# Patient Record
Sex: MALE | Race: WHITE | NOT HISPANIC OR LATINO | Employment: FULL TIME | URBAN - METROPOLITAN AREA
[De-identification: names, ages, dates, MRNs, and addresses within clinical notes are randomized per-mention and may not be internally consistent; named-entity substitution may affect disease eponyms.]

---

## 2017-01-06 RX ORDER — TADALAFIL 20 MG/1
20 TABLET ORAL
Status: ON HOLD | COMMUNITY
End: 2017-01-10

## 2017-01-06 RX ORDER — SIMVASTATIN 40 MG
40 TABLET ORAL DAILY
Status: ON HOLD | COMMUNITY
End: 2018-02-01

## 2017-01-06 RX ORDER — ASPIRIN 81 MG/1
81 TABLET ORAL DAILY
Status: ON HOLD | COMMUNITY
End: 2017-01-10

## 2017-01-09 ENCOUNTER — ANESTHESIA EVENT (OUTPATIENT)
Dept: GASTROENTEROLOGY | Facility: MEDICAL CENTER | Age: 57
End: 2017-01-09
Payer: COMMERCIAL

## 2017-01-09 RX ORDER — SODIUM CHLORIDE 9 MG/ML
125 INJECTION, SOLUTION INTRAVENOUS CONTINUOUS
Status: CANCELLED | OUTPATIENT
Start: 2017-01-09

## 2017-01-10 ENCOUNTER — GENERIC CONVERSION - ENCOUNTER (OUTPATIENT)
Dept: GASTROENTEROLOGY | Facility: MEDICAL CENTER | Age: 57
End: 2017-01-10

## 2017-01-10 ENCOUNTER — HOSPITAL ENCOUNTER (OUTPATIENT)
Facility: MEDICAL CENTER | Age: 57
Setting detail: OUTPATIENT SURGERY
Discharge: HOME | End: 2017-01-10
Attending: INTERNAL MEDICINE | Admitting: INTERNAL MEDICINE
Payer: COMMERCIAL

## 2017-01-10 ENCOUNTER — ANESTHESIA (OUTPATIENT)
Dept: GASTROENTEROLOGY | Facility: MEDICAL CENTER | Age: 57
End: 2017-01-10
Payer: COMMERCIAL

## 2017-01-10 VITALS
RESPIRATION RATE: 15 BRPM | WEIGHT: 163 LBS | BODY MASS INDEX: 23.34 KG/M2 | DIASTOLIC BLOOD PRESSURE: 59 MMHG | HEIGHT: 70 IN | OXYGEN SATURATION: 99 % | SYSTOLIC BLOOD PRESSURE: 104 MMHG | TEMPERATURE: 97.4 F | HEART RATE: 72 BPM

## 2017-01-10 DIAGNOSIS — Z12.11 ENCOUNTER FOR SCREENING FOR MALIGNANT NEOPLASM OF COLON: ICD-10-CM

## 2017-01-10 PROCEDURE — 88305 TISSUE EXAM BY PATHOLOGIST: CPT | Performed by: INTERNAL MEDICINE

## 2017-01-10 RX ORDER — PROPOFOL 10 MG/ML
INJECTION, EMULSION INTRAVENOUS AS NEEDED
Status: DISCONTINUED | OUTPATIENT
Start: 2017-01-10 | End: 2017-01-10 | Stop reason: SURG

## 2017-01-10 RX ORDER — ONDANSETRON 2 MG/ML
4 INJECTION INTRAMUSCULAR; INTRAVENOUS EVERY 6 HOURS PRN
Status: DISCONTINUED | OUTPATIENT
Start: 2017-01-10 | End: 2017-01-10 | Stop reason: HOSPADM

## 2017-01-10 RX ORDER — MELOXICAM 15 MG/1
15 TABLET ORAL DAILY
Status: ON HOLD | COMMUNITY
End: 2018-02-01

## 2017-01-10 RX ORDER — SODIUM CHLORIDE 9 MG/ML
125 INJECTION, SOLUTION INTRAVENOUS CONTINUOUS
Status: DISCONTINUED | OUTPATIENT
Start: 2017-01-10 | End: 2017-01-10 | Stop reason: HOSPADM

## 2017-01-10 RX ADMIN — PROPOFOL 50 MG: 10 INJECTION, EMULSION INTRAVENOUS at 14:17

## 2017-01-10 RX ADMIN — PROPOFOL 25 MG: 10 INJECTION, EMULSION INTRAVENOUS at 14:25

## 2017-01-10 RX ADMIN — PROPOFOL 50 MG: 10 INJECTION, EMULSION INTRAVENOUS at 14:12

## 2017-01-10 RX ADMIN — PROPOFOL 50 MG: 10 INJECTION, EMULSION INTRAVENOUS at 14:14

## 2017-01-10 RX ADMIN — PROPOFOL 100 MG: 10 INJECTION, EMULSION INTRAVENOUS at 14:09

## 2017-01-10 RX ADMIN — SODIUM CHLORIDE: 0.9 INJECTION, SOLUTION INTRAVENOUS at 14:15

## 2017-01-10 RX ADMIN — SODIUM CHLORIDE: 0.9 INJECTION, SOLUTION INTRAVENOUS at 14:05

## 2017-01-10 RX ADMIN — LIDOCAINE HYDROCHLORIDE 50 MG: 20 INJECTION, SOLUTION INTRAVENOUS at 14:05

## 2017-01-10 RX ADMIN — PROPOFOL 25 MG: 10 INJECTION, EMULSION INTRAVENOUS at 14:21

## 2017-01-10 RX ADMIN — SODIUM CHLORIDE 125 ML/HR: 0.9 INJECTION, SOLUTION INTRAVENOUS at 13:13

## 2017-01-12 ENCOUNTER — GENERIC CONVERSION - ENCOUNTER (OUTPATIENT)
Dept: OTHER | Facility: OTHER | Age: 57
End: 2017-01-12

## 2017-02-20 ENCOUNTER — ALLSCRIPTS OFFICE VISIT (OUTPATIENT)
Dept: OTHER | Facility: OTHER | Age: 57
End: 2017-02-20

## 2017-02-21 ENCOUNTER — LAB CONVERSION - ENCOUNTER (OUTPATIENT)
Dept: OTHER | Facility: OTHER | Age: 57
End: 2017-02-21

## 2017-02-21 LAB
CONTACT: (HISTORICAL): NORMAL
PROSTATE SPECIFIC ANTIGEN TOTAL (HISTORICAL): 2.5 NG/ML
TEST INFORMATION (HISTORICAL): NORMAL
TEST NAME (HISTORICAL): NORMAL

## 2017-02-22 ENCOUNTER — GENERIC CONVERSION - ENCOUNTER (OUTPATIENT)
Dept: OTHER | Facility: OTHER | Age: 57
End: 2017-02-22

## 2017-08-21 ENCOUNTER — LAB REQUISITION (OUTPATIENT)
Dept: LAB | Facility: HOSPITAL | Age: 57
End: 2017-08-21
Payer: COMMERCIAL

## 2017-08-21 ENCOUNTER — ALLSCRIPTS OFFICE VISIT (OUTPATIENT)
Dept: OTHER | Facility: OTHER | Age: 57
End: 2017-08-21

## 2017-08-21 DIAGNOSIS — E78.49 OTHER HYPERLIPIDEMIA: ICD-10-CM

## 2017-08-21 DIAGNOSIS — R73.03 PREDIABETES: ICD-10-CM

## 2017-08-21 LAB
ALBUMIN SERPL BCP-MCNC: 3.8 G/DL (ref 3.5–5)
ALP SERPL-CCNC: 41 U/L (ref 46–116)
ALT SERPL W P-5'-P-CCNC: 25 U/L (ref 12–78)
ANION GAP SERPL CALCULATED.3IONS-SCNC: 7 MMOL/L (ref 4–13)
AST SERPL W P-5'-P-CCNC: 20 U/L (ref 5–45)
BILIRUB SERPL-MCNC: 0.68 MG/DL (ref 0.2–1)
BUN SERPL-MCNC: 21 MG/DL (ref 5–25)
CALCIUM SERPL-MCNC: 9.1 MG/DL (ref 8.3–10.1)
CHLORIDE SERPL-SCNC: 110 MMOL/L (ref 100–108)
CHOLEST SERPL-MCNC: 151 MG/DL (ref 50–200)
CO2 SERPL-SCNC: 24 MMOL/L (ref 21–32)
CREAT SERPL-MCNC: 0.89 MG/DL (ref 0.6–1.3)
EST. AVERAGE GLUCOSE BLD GHB EST-MCNC: 117 MG/DL
GFR SERPL CREATININE-BSD FRML MDRD: 94 ML/MIN/1.73SQ M
GLUCOSE P FAST SERPL-MCNC: 106 MG/DL (ref 65–99)
HBA1C MFR BLD: 5.7 % (ref 4.2–6.3)
HDLC SERPL-MCNC: 36 MG/DL (ref 40–60)
LDLC SERPL CALC-MCNC: 91 MG/DL (ref 0–100)
POTASSIUM SERPL-SCNC: 4.4 MMOL/L (ref 3.5–5.3)
PROT SERPL-MCNC: 7.6 G/DL (ref 6.4–8.2)
SODIUM SERPL-SCNC: 141 MMOL/L (ref 136–145)
TRIGL SERPL-MCNC: 118 MG/DL

## 2017-08-21 PROCEDURE — 80061 LIPID PANEL: CPT | Performed by: FAMILY MEDICINE

## 2017-08-21 PROCEDURE — 80053 COMPREHEN METABOLIC PANEL: CPT | Performed by: FAMILY MEDICINE

## 2017-08-21 PROCEDURE — 83036 HEMOGLOBIN GLYCOSYLATED A1C: CPT | Performed by: FAMILY MEDICINE

## 2017-08-22 ENCOUNTER — GENERIC CONVERSION - ENCOUNTER (OUTPATIENT)
Dept: OTHER | Facility: OTHER | Age: 57
End: 2017-08-22

## 2017-08-29 ENCOUNTER — ALLSCRIPTS OFFICE VISIT (OUTPATIENT)
Dept: OTHER | Facility: OTHER | Age: 57
End: 2017-08-29

## 2017-09-13 ENCOUNTER — ALLSCRIPTS OFFICE VISIT (OUTPATIENT)
Dept: OTHER | Facility: OTHER | Age: 57
End: 2017-09-13

## 2017-09-24 ENCOUNTER — GENERIC CONVERSION - ENCOUNTER (OUTPATIENT)
Dept: OTHER | Facility: OTHER | Age: 57
End: 2017-09-24

## 2017-10-30 ENCOUNTER — GENERIC CONVERSION - ENCOUNTER (OUTPATIENT)
Dept: OTHER | Facility: OTHER | Age: 57
End: 2017-10-30

## 2017-12-06 ENCOUNTER — GENERIC CONVERSION - ENCOUNTER (OUTPATIENT)
Dept: FAMILY MEDICINE CLINIC | Facility: CLINIC | Age: 57
End: 2017-12-06

## 2018-01-11 ENCOUNTER — ALLSCRIPTS OFFICE VISIT (OUTPATIENT)
Dept: OTHER | Facility: OTHER | Age: 58
End: 2018-01-11

## 2018-01-11 ENCOUNTER — TRANSCRIBE ORDERS (OUTPATIENT)
Dept: ADMINISTRATIVE | Facility: HOSPITAL | Age: 58
End: 2018-01-11

## 2018-01-11 DIAGNOSIS — N20.0 URIC ACID NEPHROLITHIASIS: Primary | ICD-10-CM

## 2018-01-14 VITALS
RESPIRATION RATE: 16 BRPM | SYSTOLIC BLOOD PRESSURE: 124 MMHG | HEART RATE: 68 BPM | HEIGHT: 71 IN | TEMPERATURE: 96.4 F | BODY MASS INDEX: 22.31 KG/M2 | DIASTOLIC BLOOD PRESSURE: 76 MMHG | OXYGEN SATURATION: 98 % | WEIGHT: 159.38 LBS

## 2018-01-14 VITALS
HEIGHT: 70 IN | BODY MASS INDEX: 23.37 KG/M2 | SYSTOLIC BLOOD PRESSURE: 118 MMHG | HEART RATE: 72 BPM | DIASTOLIC BLOOD PRESSURE: 82 MMHG | WEIGHT: 163.25 LBS

## 2018-01-14 VITALS
HEART RATE: 63 BPM | RESPIRATION RATE: 16 BRPM | HEIGHT: 70 IN | TEMPERATURE: 95.9 F | BODY MASS INDEX: 23.4 KG/M2 | OXYGEN SATURATION: 99 % | WEIGHT: 163.44 LBS | SYSTOLIC BLOOD PRESSURE: 110 MMHG | DIASTOLIC BLOOD PRESSURE: 70 MMHG

## 2018-01-15 NOTE — PROGRESS NOTES
Assessment   1  Nephrolithiasis (592 0) (N20 0)    Plan   Nephrolithiasis    · CT RENAL STONE STUDY ABDOMEN PELVIS WO CONTRAST; Status:Need Information    - Financial Authorization; Requested LQR:[id number]; Perform:[facility]; EZA:[id number]; Last Updated By:Yvonne Ayers; 1/11/2018 11:38:25 AM;Ordered;For:Nephrolithiasis; Ordered By:Rosemarie Haji; Discussion/Summary   Discussion Summary:    BPH and nephrolithiasis   is a 60 y/o male being managed by Dr April Triplett  He is currently comfortable  He is concerned that his stone has had past  A CT stone study we will be obtained to evaluate the location of his stone  He will turn to review results  He was instructed to call with any recurrence of symptoms  PVR should be obtained at his next visit to evaluate his enlarged prostate  All questions were answered  Chief Complaint   Chief Complaint Free Text Note Form: Patient presents for follow up due to nephrolithiasis      History of Present Illness   HPI: 68-year-old male recently evaluated for BPH presents today for evaluation of kidney stones  He states last Friday he developed severe left flank pain  He presented to [facility] we will embark  CT scan was performed and revealed a 3 mm left ureteral stone  Unfortunately these records are not available at this time  He denies any pain since his 1st episode  He denies any nausea or vomiting  He denies any lower urinary tract symptoms or gross hematuria  He is taking Flomax daily  He does admit to having a stone episode about 5 years ago  Review of Systems   Complete-Male Urology:      Constitutional: No fever or chills, feels well, no tiredness, no recent weight gain or weight loss  Respiratory: No complaints of shortness of breath, no wheezing, no cough, no SOB on exertion, no orthopnea or PND  Cardiovascular: No complaints of slow heart rate, no fast heart rate, no chest pain, no palpitations, no leg claudication, no lower extremity  Gastrointestinal: No complaints of abdominal pain, no constipation, no nausea or vomiting, no diarrhea or bloody stools  Genitourinary: Empty sensation-- and-- stream quality good, but-- no dysuria,-- no urinary hesitancy,-- no hematuria,-- no incontinence-- and-- no feelings of urinary urgency--       The patient presents with complaints of nocturia (2 times *)  Musculoskeletal: No complaints of arthralgia, no myalgias, no joint swelling or stiffness, no limb pain or swelling  Integumentary: No complaints of skin rash or skin lesions, no itching, no skin wound, no dry skin  Hematologic/Lymphatic: No complaints of swollen glands, no swollen glands in the neck, does not bleed easily, no easy bruising  Neurological: No compliants of headache, no confusion, no convulsions, no numbness or tingling, no dizziness or fainting, no limb weakness, no difficulty walking  ROS Reviewed:    ROS reviewed  Active Problems   1  Acute bacterial sinusitis (461 9) (J01 90,B96 89)   2  Anxiety (300 00) (F41 9)   3  Arthralgia of left temporomandibular joint (524 62) (M26 622)   4  BPH with obstruction/lower urinary tract symptoms (600 01,599 69) (N40 1,N13 8)   5  Cellulitis (682 9) (L03 90)   6  Depression (311) (F32 9)   7  Erectile dysfunction of non-organic origin (302 72) (F52 21)   8  Familial combined hyperlipidemia (272 2) (E78 4)   9  Folliculitis (431 2) (K44 4)   10  Need for influenza vaccination (V04 81) (Z23)   11  Nephrolithiasis (592 0) (N20 0)   12  Peripheral neuropathy (356 9) (G62 9)   13  Personal history of colonic polyps (V12 72) (Z86 010)   14  Prediabetes (790 29) (R73 03)    Past Medical History   1  History of Abnormal blood chemistry (790 6) (R79 9)   2  History of Bilateral foot pain (729 5) (M79 671,M79 672)   3  History of Encounter for prostate cancer screening (V76 44) (Z12 5)   4  History of Encounter for screening colonoscopy (V76 51) (Z12 11)   5   History of Flu vaccine need (V04 81) (Z23)   6  History of Foot Pain (Soft Tissue) (729 5)   7  History of acute sinusitis (V12 69) (Z87 09)   8  History of allergic rhinitis (V12 69) (Z87 09)   9  History of hyperglycemia (V12 29) (Z86 39)   10  History of viral infection (V12 09) (Z86 19)   11  History of Lumbar Strain (847 2)   12  History of Paresthesia of foot (782 0) (R20 2)   13  History of Special screening examination for neoplasm of prostate (V76 44) (Z12 5)   14  History of Visit For:  Active Problems And Past Medical History Reviewed: The active problems and past medical history were reviewed and updated today  Surgical History   1  History of Foot Surgery   2  History of Inguinal Hernia Repair  Surgical History Reviewed: The surgical history was reviewed and updated today  Family History   Mother    1  Family history of    2  Family history of lung cancer (V16 1) (Z80 1)   3  Denied: Family history of substance abuse   4  Denied: Family history of Mental health problem  Father    5  Family history of    6  Family history of lung cancer (V16 1) (Z80 1)   7  Denied: Family history of substance abuse   8  Denied: Family history of Mental health problem  Family History Reviewed: The family history was reviewed and updated today  Social History    · Always uses seat belt   · Feels safe at home   · Never A Smoker   · No guns in the home   · Social alcohol use (Z78 9)  Social History Reviewed: The social history was reviewed and updated today  The social history was reviewed and is unchanged  Current Meds    1  Adult Aspirin EC Low Strength 81 MG Oral Tablet Delayed Release; Take 1 tablet daily     Recorded   2  Cialis 20 MG Oral Tablet; TAKE AS DIRECTED; Therapy: 62EXZ7426 to (Last Rx:64Mgq7119) Ordered   3  Daily Multiple Vitamins Oral Tablet; Take 1 tablet daily Recorded   4  Meloxicam 15 MG Oral Tablet; TAKE 1 TABLET DAILY WITH FOOD;      Therapy: 19Tha7893 to Recorded   5  Simvastatin 20 MG Oral Tablet; Take 1 tablet daily; Therapy: 42JTN8390 to (Evaluate:19Jan2018)  Requested for: 44Wcl7262; Last     Rx:38Pld7304 Ordered   6  ZyrTEC Allergy CAPS; PRN Recorded  Medication List Reviewed: The medication list was reviewed and updated today  Allergies   1  No Known Drug Allergies  2  Pollen   3  Trees    Vitals   Vital Signs    Recorded: 64DNX4683 11:03AM   Heart Rate 76   Systolic 151   Diastolic 82   Height 5 ft 10 in   Weight 164 lb    BMI Calculated 23 53   BSA Calculated 1 92     Physical Exam        Constitutional      General appearance: No acute distress, well appearing and well nourished  Pulmonary      Respiratory effort: No increased work of breathing or signs of respiratory distress  Cardiovascular      Palpation of heart: Normal PMI, no thrills  Examination of extremities for edema and/or varicosities: Normal        Abdomen      Abdomen: Non-tender, no masses  Musculoskeletal      Gait and station: Normal        Skin      Skin and subcutaneous tissue: Normal without rashes or lesions         Signatures    Electronically signed by : Adali Copeland, [address] [place] [address]; Jan 11 2018 11:45AM EST                       (Author)     Electronically signed by : Jez Carlisle MD; Jan 14 2018  7:23PM EST

## 2018-01-15 NOTE — RESULT NOTES
Message  I spoke with patient regarding lab results  Georgette Ormond Georgette Ormond 1) blood sugar is still mildly elevated at 106 (A1c 5 7%)  Recommend reduced carb diet  Continue exercise program  We'll continue to monitor    2) cholesterol 151/71  Patient doing well on simvastatin 40, but he is requesting a trial reduction of dosage  Will reduce to 20 mg daily  Will recheck labs in 6 months a quest followed by appointment  3) patient mentioned that he has been having nocturia recently  He made an appointment to see a urologist on September 13  Signatures   Electronically signed by :  Edita Modi DO; Aug 22 2017 11:01AM EST                       (Author)

## 2018-01-16 NOTE — RESULT NOTES
Verified Results  (Q) COMPREHENSIVE METABOLIC PNL W/ADJUSTED CALCIUM [id number] 06:46AM Martha Marcus     Test Name Result Flag Reference   GLUCOSE 101 mg/dL H 65-99   Fasting reference interval   UREA NITROGEN (BUN) 22 mg/dL  7-25   CREATININE 0 97 mg/dL  0 70-1 33   For patients >52years of age, the reference limit  for Creatinine is approximately 13% higher for people  identified as -American  eGFR NON-AFR  AMERICAN 86 mL/min/1 73m2  > OR = 60   eGFR AFRICAN AMERICAN 100 mL/min/1 73m2  > OR = 60   BUN/CREATININE RATIO   8-32   NOT APPLICABLE (calc)   SODIUM 138 mmol/L  135-146   POTASSIUM 4 1 mmol/L  3 5-5 3   CHLORIDE 105 mmol/L     CARBON DIOXIDE 24 mmol/L  19-30   CALCIUM 9 2 mg/dL  8 6-10 3   CALCIUM (ADJUSTED FOR$ALBUMIN) 9 3 mg/dL (calc)  8 6-10 2   PROTEIN, TOTAL 7 3 g/dL  6 1-8 1   ALBUMIN 4 2 g/dL  3 6-5 1   GLOBULIN 3 1 g/dL (calc)  1 9-3 7   ALBUMIN/GLOBULIN RATIO 1 4 (calc)  1 0-2 5   BILIRUBIN, TOTAL 0 8 mg/dL  0 2-1 2   ALKALINE PHOSPHATASE 34 U/L L    AST 22 U/L  10-35   ALT 25 U/L  9-46     (Q) LIPID PANEL WITH REFLEX TO DIRECT LDL 15OGP8275 06:46AM Martha Marcus     Test Name Result Flag Reference   CHOLESTEROL, TOTAL 175 mg/dL  125-200   HDL CHOLESTEROL 37 mg/dL L > OR = 40   TRIGLICERIDES 603 mg/dL  <150   LDL-CHOLESTEROL 112 mg/dL (calc)  <130   Desirable range <100 mg/dL for patients with CHD or  diabetes and <70 mg/dL for diabetic patients with  known heart disease  CHOL/HDLC RATIO 4 7 (calc)  < OR = 5 0   NON HDL CHOLESTEROL 138 mg/dL (calc)     Target for non-HDL cholesterol is 30 mg/dL higher than   LDL cholesterol target  (Q) PSA, TOTAL WITH REFLEX TO PSA, FREE [id number] 06:46AM Martha Marcus   REPORT COMMENT:  FASTING:YES     Test Name Result Flag Reference   TOTAL PSA 2 3 ng/mL  < OR = 4 0   This test was performed using the [place]  immunoassay method  Values obtained with other assay  methods cannot be used interchangeably   PSA levels,  regardless of value, should not be interpreted as  absolute evidence of the presence or absence of disease  Plan  Blood glucose elevated, BPH with obstruction/lower urinary tract symptoms, Health  Maintenance, Familial combined hyperlipidemia    · (Q) CBC (H/H, RBC, INDICES, WBC, PLT); Status:Active; Requested for:80Tuk5823;    · (Q) COMPREHENSIVE METABOLIC PNL W/ADJUSTED CALCIUM; Status:Active; Requested for:20Imu6457;    · (Q) HEMOGLOBIN A1c WITH eAG; Status:Active; Requested for:19Jul2016;    · (Q) LIPID PANEL WITH REFLEX TO DIRECT LDL; Status:Active; Requested  for:81Dil4184;    · (Q) TSH, 3RD GENERATION W/REFLEX TO FT4; Status:Active;  Requested  for:19Jul2016;

## 2018-01-16 NOTE — RESULT NOTES
Message   reminder for 5 years  thansk  Verified Results  (1) TISSUE EXAM [id number] 02:20PM Ashlie Vázquez     Test Name Result Flag Reference   LAB AP CASE REPORT (Report)     Surgical Pathology Report             Case: [id number]                   Authorizing Provider: Sarai Zamarripa MD       Collected:      01/10/2017 1420        Ordering Location:   [facility]    Received:      01/11/2017 [facility]                            Pathologist:      Rashaun Arenas DO                               Specimen:  Polyp, Colorectal, Descending colon polyp   LAB AP FINAL DIAGNOSIS      A  Colon, descending polyp, biopsy:  - Tubular adenoma  - Negative for high-grade dysplasia  Interpretation performed at [facility], [place] [address]  Electronically signed by Rashaun Arenas DO on 1/12/2017 at 8:55 AM   LAB AP SURGICAL ADDITIONAL INFORMATION (Report)     These tests were developed and their performance characteristics   determined by [facility] or [employer]  They may not be cleared or approved by the [place]  Food and   Drug Administration  The FDA has determined that such clearance or   approval is not necessary  These tests are used for clinical purposes  They should not be regarded as investigational or for research  This   laboratory has been approved by CLIA 88, designated as a high-complexity   laboratory and is qualified to perform these tests  LAB AP GROSS DESCRIPTION (Report)     A  The specimen is received in formalin, labeled with the patient's name   and hospital number, and is designated descending colon polyp, is a   single irregularly shaped fragment of tan-brown soft tissue measuring 0 3   cm in greatest dimension  Entirely submitted  One cassette       Note: The estimated total formalin fixation time based upon information   provided by the submitting clinician and the standard processing schedule   is 23 75 hours  RLR   LAB AP CLINICAL INFORMATION      descending colon polyp cold forceps   descending colon polyp cold forceps       Discussion/Summary   Benign polyp - tubular adenoma  REcommend repeat in 5 years

## 2018-01-17 ENCOUNTER — HOSPITAL ENCOUNTER (OUTPATIENT)
Dept: RADIOLOGY | Age: 58
Discharge: HOME | End: 2018-01-17
Payer: COMMERCIAL

## 2018-01-17 DIAGNOSIS — N20.0 CALCULUS OF KIDNEY: ICD-10-CM

## 2018-01-17 PROCEDURE — 74176 CT ABD & PELVIS W/O CONTRAST: CPT

## 2018-01-17 NOTE — RESULT NOTES
Message   call pt   his prostate level was ok (2 5)  Verified Results  (Q) TEST AUTHORIZATION [id number] 12:00AM Eldonna Po     Test Name Result Flag Reference   TEST(S) ORDERED ON$REQUISITION PSA, TOTAL     TEST CODE: [id number]     CLIENT CONTACT: SHONA KELLY     REPORT ALWAYS MESSAGE$SIGNATURE See Below     The laboratory testing on this patient was verbally requested  or confirmed by the ordering physician or his or her authorized  representative after contact with an employee of [employer]  Federal regulations require that we maintain on file written  authorization for all laboratory testing  Accordingly we are asking  that the ordering physician or his or her authorized representative  sign a copy of this report and promptly return it to the client    Signature:____________________________________________________     (1) PSA (SCREEN) (Dx V76 44 Screen for Prostate Cancer) [id number] 12:00AM [employer] [facility]     Test Name Result Flag Reference   PSA, TOTAL 2 5 ng/mL  < OR = 4 0   This test was performed using the Siemens  chemiluminescent method  Values obtained from  different assay methods cannot be used  interchangeably  PSA levels, regardless of  value, should not be interpreted as absolute  evidence of the presence or absence of disease

## 2018-01-22 VITALS
HEIGHT: 70 IN | HEART RATE: 76 BPM | SYSTOLIC BLOOD PRESSURE: 124 MMHG | WEIGHT: 164 LBS | BODY MASS INDEX: 23.48 KG/M2 | DIASTOLIC BLOOD PRESSURE: 82 MMHG

## 2018-01-23 ENCOUNTER — TRANSCRIBE ORDERS (OUTPATIENT)
Dept: ADMINISTRATIVE | Age: 58
End: 2018-01-23

## 2018-01-23 ENCOUNTER — APPOINTMENT (OUTPATIENT)
Dept: RADIOLOGY | Age: 58
End: 2018-01-23
Payer: COMMERCIAL

## 2018-01-23 DIAGNOSIS — N20.0 CALCULUS OF KIDNEY: ICD-10-CM

## 2018-01-23 PROCEDURE — 74018 RADEX ABDOMEN 1 VIEW: CPT

## 2018-01-26 ENCOUNTER — OFFICE VISIT (OUTPATIENT)
Dept: UROLOGY | Facility: AMBULATORY SURGERY CENTER | Age: 58
End: 2018-01-26
Payer: COMMERCIAL

## 2018-01-26 VITALS
HEART RATE: 80 BPM | DIASTOLIC BLOOD PRESSURE: 78 MMHG | BODY MASS INDEX: 23.19 KG/M2 | HEIGHT: 70 IN | SYSTOLIC BLOOD PRESSURE: 130 MMHG | WEIGHT: 162 LBS

## 2018-01-26 DIAGNOSIS — N20.0 KIDNEY STONES: ICD-10-CM

## 2018-01-26 DIAGNOSIS — N52.9 ERECTILE DYSFUNCTION, UNSPECIFIED ERECTILE DYSFUNCTION TYPE: ICD-10-CM

## 2018-01-26 DIAGNOSIS — N40.1 BPH WITH OBSTRUCTION/LOWER URINARY TRACT SYMPTOMS: Primary | ICD-10-CM

## 2018-01-26 DIAGNOSIS — N13.8 BPH WITH OBSTRUCTION/LOWER URINARY TRACT SYMPTOMS: Primary | ICD-10-CM

## 2018-01-26 PROCEDURE — 99214 OFFICE O/P EST MOD 30 MIN: CPT | Performed by: NURSE PRACTITIONER

## 2018-01-26 RX ORDER — SIMVASTATIN 20 MG
1 TABLET ORAL DAILY
Status: ON HOLD | COMMUNITY
Start: 2012-02-06 | End: 2018-02-01 | Stop reason: ALTCHOICE

## 2018-01-26 RX ORDER — TAMSULOSIN HYDROCHLORIDE 0.4 MG/1
0.4 CAPSULE ORAL
COMMUNITY
Start: 2018-01-05 | End: 2018-02-08

## 2018-01-26 NOTE — PROGRESS NOTES
1/26/2018    Manoj Monroe [place]  1960  [id number]        Assessment  BPH  Nephrolithiasis       Discussion  Yumiko Jamil is a 59 y o  male being managed by Dr Hipolito Wall  His recent KUB was reviewed and no stone was visualized however it was not visualized on the  view of his CT scan  Since he remains symptomatic we discussed proceeding with surgical intervention  He is agreeable  He will be scheduled for cystoscopy, left ureteroscopy with laser, stone extraction, left retrograde pyelogram, left ureteral stent insertion  This procedure was reviewed with the patient in detail  Risks and benefits were discussed  He will have a repeat CT stone study prior to the procedure to confirm the stone is still present  He was instructed to call if the stone passes  ER precautions again reviewed  Once his stone has been treated we will then discuss further management of his urinary symptoms  All questions were answered  History of Present Illness  59 y o  male with a history of BPH and kidney stones was recently evaluated and complained of left flank pain  CT scan was obtained revealing a 3mm left UVJ calculus  He  presents today for follow up  He states on Sunday he had pain  Resolved with NSAIDS  He denies passage of stone  He has had worsening nocturia and urinary frequency  He is taking Flomax daily  He denies any gross hematuria  He denies any nausea or vomiting  He denies any fevers  Review of Systems  Review of Systems   Constitutional: Negative  HENT: Negative  Respiratory: Negative  Cardiovascular: Negative  Gastrointestinal: Negative  Genitourinary: Positive for frequency  Musculoskeletal: Negative  Skin: Negative  Neurological: Negative  Hematological: Negative            Past Medical History  Past Medical History:   Diagnosis Date    Anxiety     Erectile dysfunction of non-organic origin     Hyperlipidemia        Past Social History  Past Surgical History: Procedure Laterality Date    FOOT SURGERY      HERNIA REPAIR      WA COLONOSCOPY FLX DX W/COLLJ SPEC WHEN PFRMD N/A 1/10/2017    Procedure: COLONOSCOPY;  Surgeon: Wendy Gonzalez MD;  Location: [facility]; Service: Gastroenterology       Past Family History  No family history on file  Past Social history  Social History     Social History    Marital status:      Spouse name: N/A    Number of children: N/A    Years of education: N/A     Occupational History    Not on file  Social History Main Topics    Smoking status: Never Smoker    Smokeless tobacco: Not on file    Alcohol use No    Drug use: No    Sexual activity: Not on file     Other Topics Concern    Not on file     Social History Narrative    No narrative on file       Current Medications  Current Outpatient Prescriptions   Medication Sig Dispense Refill    Cetirizine HCl (ZYRTEC ALLERGY) 10 MG CAPS Take by mouth daily as needed      DAILY MULTIPLE VITAMINS/IRON PO Take 1 tablet by mouth daily      meloxicam (MOBIC) 15 mg tablet Take 15 mg by mouth daily      simvastatin (ZOCOR) 20 mg tablet Take 1 tablet by mouth daily      simvastatin (ZOCOR) 40 mg tablet Take 40 mg by mouth daily      tamsulosin (FLOMAX) 0 4 mg Take 0 4 mg by mouth       No current facility-administered medications for this visit  Allergies  Allergies   Allergen Reactions    Other      trees    Pollen Extract        Past Medical History, Social History, Family History, medications and allergies were reviewed  Vitals  Vitals:    01/26/18 1338   BP: 130/78   Pulse: 80   Weight: 73 5 kg (162 lb)   Height: 5' 10" (1 778 m)       Physical Exam  Skin: warm, dry, intact  Cardiac: S1S2, HRR, Peripheral edema: positive  Pulmonary: Non-labored breathing  Abdomen: Soft, non-tender, non-distended  Musculoskeletal: AROM with no joint deformity or tenderness    Neurology: alert, oriented x3, affect appropriate  Genitourinary: Negative CVA tenderness, negative suprapubic tenderness            Results  No results found for: PSA  Lab Results   Component Value Date    GLUCOSE 113 08/14/2015    CALCIUM 9 1 08/21/2017     08/21/2017    K 4 4 08/21/2017    CO2 24 08/21/2017     (H) 08/21/2017    BUN 21 08/21/2017    CREATININE 0 89 08/21/2017     Lab Results   Component Value Date    WBC 5 6 02/14/2017    HGB 13 6 02/14/2017    HCT 41 0 02/14/2017    MCV 88 6 02/14/2017     02/14/2017           Procedures

## 2018-01-26 NOTE — LETTER
January 26, 2018     Dominick Lane DO  [address]  [address]    Patient: Miki Prather   YOB: 1960   Date of Visit: 1/26/2018       Dear Dr Ismael Moreno:    Thank you for referring Paco Whaley to me for evaluation  Below are my notes for this consultation  If you have questions, please do not hesitate to call me  I look forward to following your patient along with you  Sincerely,        AMADEO Carl        CC: No Recipients  AMADEO Carl  1/26/2018  2:21 PM  Sign at close encounter  1/26/2018    Kaylynn Chen [place]  1960  [id number]        Assessment  BPH  Nephrolithiasis       Discussion  Ashley Clark is a 59 y o  male being managed by Dr Obdulio Sommers  His recent KUB was reviewed and no stone was visualized however it was not visualized on the  view of his CT scan  Since he remains symptomatic we discussed proceeding with surgical intervention  He is agreeable  He will be scheduled for cystoscopy, left ureteroscopy with laser, stone extraction, left retrograde pyelogram, left ureteral stent insertion  This procedure was reviewed with the patient in detail  Risks and benefits were discussed  He will have a repeat CT stone study prior to the procedure to confirm the stone is still present  He was instructed to call if the stone passes  ER precautions again reviewed  Once his stone has been treated we will then discuss further management of his urinary symptoms  All questions were answered  History of Present Illness  59 y o  male with a history of BPH and kidney stones was recently evaluated and complained of left flank pain  CT scan was obtained revealing a 3mm left UVJ calculus  He  presents today for follow up  He states on Sunday he had pain  Resolved with NSAIDS  He denies passage of stone  He has had worsening nocturia and urinary frequency  He is taking Flomax daily  He denies any gross hematuria    He denies any nausea or vomiting  He denies any fevers  Review of Systems  Review of Systems   Constitutional: Negative  HENT: Negative  Respiratory: Negative  Cardiovascular: Negative  Gastrointestinal: Negative  Genitourinary: Positive for frequency  Musculoskeletal: Negative  Skin: Negative  Neurological: Negative  Hematological: Negative  Past Medical History  Past Medical History:   Diagnosis Date    Anxiety     Erectile dysfunction of non-organic origin     Hyperlipidemia        Past Social History  Past Surgical History:   Procedure Laterality Date    FOOT SURGERY      HERNIA REPAIR      ME COLONOSCOPY FLX DX W/COLLJ SPEC WHEN PFRMD N/A 1/10/2017    Procedure: COLONOSCOPY;  Surgeon: Ralph Feldman MD;  Location: [facility] GI LAB; Service: Gastroenterology       Past Family History  No family history on file  Past Social history  Social History     Social History    Marital status:      Spouse name: N/A    Number of children: N/A    Years of education: N/A     Occupational History    Not on file  Social History Main Topics    Smoking status: Never Smoker    Smokeless tobacco: Not on file    Alcohol use No    Drug use: No    Sexual activity: Not on file     Other Topics Concern    Not on file     Social History Narrative    No narrative on file       Current Medications  Current Outpatient Prescriptions   Medication Sig Dispense Refill    Cetirizine HCl (ZYRTEC ALLERGY) 10 MG CAPS Take by mouth daily as needed      DAILY MULTIPLE VITAMINS/IRON PO Take 1 tablet by mouth daily      meloxicam (MOBIC) 15 mg tablet Take 15 mg by mouth daily      simvastatin (ZOCOR) 20 mg tablet Take 1 tablet by mouth daily      simvastatin (ZOCOR) 40 mg tablet Take 40 mg by mouth daily      tamsulosin (FLOMAX) 0 4 mg Take 0 4 mg by mouth       No current facility-administered medications for this visit          Allergies  Allergies   Allergen Reactions    Other      trees    Pollen Extract        Past Medical History, Social History, Family History, medications and allergies were reviewed  Vitals  Vitals:    01/26/18 1338   BP: 130/78   Pulse: 80   Weight: 73 5 kg (162 lb)   Height: 5' 10" (1 778 m)       Physical Exam  Skin: warm, dry, intact  Cardiac: S1S2, HRR, Peripheral edema: positive  Pulmonary: Non-labored breathing  Abdomen: Soft, non-tender, non-distended  Musculoskeletal: AROM with no joint deformity or tenderness  Neurology: alert, oriented x3, affect appropriate  Genitourinary: Negative CVA tenderness, negative suprapubic tenderness            Results  No results found for: PSA  Lab Results   Component Value Date    GLUCOSE 113 08/14/2015    CALCIUM 9 1 08/21/2017     08/21/2017    K 4 4 08/21/2017    CO2 24 08/21/2017     (H) 08/21/2017    BUN 21 08/21/2017    CREATININE 0 89 08/21/2017     Lab Results   Component Value Date    WBC 5 6 02/14/2017    HGB 13 6 02/14/2017    HCT 41 0 02/14/2017    MCV 88 6 02/14/2017     02/14/2017           Procedures

## 2018-01-26 NOTE — LETTER
Patient scheduled for left URS with you  Patient to have CT scan prior to surgery to confirm stone still present

## 2018-01-29 ENCOUNTER — HOSPITAL ENCOUNTER (OUTPATIENT)
Dept: CT IMAGING | Facility: HOSPITAL | Age: 58
Discharge: HOME | End: 2018-01-29
Payer: COMMERCIAL

## 2018-01-29 DIAGNOSIS — N20.0 KIDNEY STONES: ICD-10-CM

## 2018-01-29 PROCEDURE — 74176 CT ABD & PELVIS W/O CONTRAST: CPT

## 2018-01-30 ENCOUNTER — ANESTHESIA EVENT (OUTPATIENT)
Dept: PERIOP | Facility: AMBULARY SURGERY CENTER | Age: 58
End: 2018-01-30
Payer: COMMERCIAL

## 2018-02-01 ENCOUNTER — HOSPITAL ENCOUNTER (OUTPATIENT)
Facility: AMBULARY SURGERY CENTER | Age: 58
Setting detail: OUTPATIENT SURGERY
Discharge: HOME | End: 2018-02-01
Attending: UROLOGY | Admitting: UROLOGY
Payer: COMMERCIAL

## 2018-02-01 ENCOUNTER — APPOINTMENT (OUTPATIENT)
Dept: RADIOLOGY | Facility: AMBULARY SURGERY CENTER | Age: 58
End: 2018-02-01
Payer: COMMERCIAL

## 2018-02-01 ENCOUNTER — ANESTHESIA (OUTPATIENT)
Dept: PERIOP | Facility: AMBULARY SURGERY CENTER | Age: 58
End: 2018-02-01
Payer: COMMERCIAL

## 2018-02-01 VITALS
WEIGHT: 157 LBS | TEMPERATURE: 98.4 F | RESPIRATION RATE: 18 BRPM | DIASTOLIC BLOOD PRESSURE: 75 MMHG | HEIGHT: 70 IN | HEART RATE: 60 BPM | OXYGEN SATURATION: 97 % | SYSTOLIC BLOOD PRESSURE: 123 MMHG | BODY MASS INDEX: 22.48 KG/M2

## 2018-02-01 DIAGNOSIS — N20.0 KIDNEY STONES: ICD-10-CM

## 2018-02-01 PROCEDURE — 88300 SURGICAL PATH GROSS: CPT | Performed by: PATHOLOGY

## 2018-02-01 PROCEDURE — 88300 SURGICAL PATH GROSS: CPT | Performed by: NURSE PRACTITIONER

## 2018-02-01 PROCEDURE — C2617 STENT, NON-COR, TEM W/O DEL: HCPCS | Performed by: UROLOGY

## 2018-02-01 PROCEDURE — 87086 URINE CULTURE/COLONY COUNT: CPT | Performed by: NURSE PRACTITIONER

## 2018-02-01 PROCEDURE — 82360 CALCULUS ASSAY QUANT: CPT | Performed by: NURSE PRACTITIONER

## 2018-02-01 PROCEDURE — 52356 CYSTO/URETERO W/LITHOTRIPSY: CPT | Performed by: UROLOGY

## 2018-02-01 PROCEDURE — C1769 GUIDE WIRE: HCPCS | Performed by: UROLOGY

## 2018-02-01 PROCEDURE — 74420 UROGRAPHY RTRGR +-KUB: CPT

## 2018-02-01 DEVICE — STENT URETERAL 6 FR 26CM INLAY OPTIMA: Type: IMPLANTABLE DEVICE | Site: URETER | Status: FUNCTIONAL

## 2018-02-01 RX ORDER — MIDAZOLAM HYDROCHLORIDE 1 MG/ML
INJECTION INTRAMUSCULAR; INTRAVENOUS AS NEEDED
Status: DISCONTINUED | OUTPATIENT
Start: 2018-02-01 | End: 2018-02-01 | Stop reason: SURG

## 2018-02-01 RX ORDER — CIPROFLOXACIN 500 MG/1
500 TABLET, FILM COATED ORAL 2 TIMES DAILY
Qty: 6 TABLET | Refills: 0 | Status: SHIPPED | OUTPATIENT
Start: 2018-02-01 | End: 2018-02-04

## 2018-02-01 RX ORDER — SODIUM CHLORIDE 9 MG/ML
INJECTION, SOLUTION INTRAVENOUS CONTINUOUS PRN
Status: DISCONTINUED | OUTPATIENT
Start: 2018-02-01 | End: 2018-02-01 | Stop reason: SURG

## 2018-02-01 RX ORDER — FENTANYL CITRATE/PF 50 MCG/ML
25 SYRINGE (ML) INJECTION
Status: DISCONTINUED | OUTPATIENT
Start: 2018-02-01 | End: 2018-02-01 | Stop reason: HOSPADM

## 2018-02-01 RX ORDER — HYDROCODONE BITARTRATE AND ACETAMINOPHEN 5; 325 MG/1; MG/1
2 TABLET ORAL EVERY 4 HOURS PRN
Status: DISCONTINUED | OUTPATIENT
Start: 2018-02-01 | End: 2018-02-01 | Stop reason: HOSPADM

## 2018-02-01 RX ORDER — HYDROCODONE BITARTRATE AND ACETAMINOPHEN 5; 325 MG/1; MG/1
2 TABLET ORAL EVERY 6 HOURS PRN
Qty: 12 TABLET | Refills: 0 | Status: SHIPPED | OUTPATIENT
Start: 2018-02-01 | End: 2018-02-11

## 2018-02-01 RX ORDER — PROPOFOL 10 MG/ML
INJECTION, EMULSION INTRAVENOUS AS NEEDED
Status: DISCONTINUED | OUTPATIENT
Start: 2018-02-01 | End: 2018-02-01 | Stop reason: SURG

## 2018-02-01 RX ORDER — ONDANSETRON 2 MG/ML
INJECTION INTRAMUSCULAR; INTRAVENOUS AS NEEDED
Status: DISCONTINUED | OUTPATIENT
Start: 2018-02-01 | End: 2018-02-01 | Stop reason: SURG

## 2018-02-01 RX ORDER — KETOROLAC TROMETHAMINE 30 MG/ML
INJECTION, SOLUTION INTRAMUSCULAR; INTRAVENOUS AS NEEDED
Status: DISCONTINUED | OUTPATIENT
Start: 2018-02-01 | End: 2018-02-01 | Stop reason: SURG

## 2018-02-01 RX ORDER — ONDANSETRON 2 MG/ML
4 INJECTION INTRAMUSCULAR; INTRAVENOUS ONCE AS NEEDED
Status: DISCONTINUED | OUTPATIENT
Start: 2018-02-01 | End: 2018-02-01 | Stop reason: HOSPADM

## 2018-02-01 RX ORDER — FENTANYL CITRATE 50 UG/ML
INJECTION, SOLUTION INTRAMUSCULAR; INTRAVENOUS AS NEEDED
Status: DISCONTINUED | OUTPATIENT
Start: 2018-02-01 | End: 2018-02-01 | Stop reason: SURG

## 2018-02-01 RX ORDER — LIDOCAINE HYDROCHLORIDE 10 MG/ML
INJECTION, SOLUTION INFILTRATION; PERINEURAL AS NEEDED
Status: DISCONTINUED | OUTPATIENT
Start: 2018-02-01 | End: 2018-02-01 | Stop reason: SURG

## 2018-02-01 RX ADMIN — FENTANYL CITRATE 25 MCG: 50 INJECTION INTRAMUSCULAR; INTRAVENOUS at 14:06

## 2018-02-01 RX ADMIN — HYDROCODONE BITARTRATE AND ACETAMINOPHEN 2 TABLET: 5; 325 TABLET ORAL at 14:55

## 2018-02-01 RX ADMIN — KETOROLAC TROMETHAMINE 30 MG: 30 INJECTION, SOLUTION INTRAMUSCULAR at 13:30

## 2018-02-01 RX ADMIN — MIDAZOLAM HYDROCHLORIDE 2 MG: 1 INJECTION, SOLUTION INTRAMUSCULAR; INTRAVENOUS at 12:59

## 2018-02-01 RX ADMIN — PROPOFOL 200 MG: 10 INJECTION, EMULSION INTRAVENOUS at 13:03

## 2018-02-01 RX ADMIN — CEFAZOLIN SODIUM 1000 MG: 1 SOLUTION INTRAVENOUS at 13:09

## 2018-02-01 RX ADMIN — SODIUM CHLORIDE: 0.9 INJECTION, SOLUTION INTRAVENOUS at 12:59

## 2018-02-01 RX ADMIN — FENTANYL CITRATE 50 MCG: 50 INJECTION, SOLUTION INTRAMUSCULAR; INTRAVENOUS at 13:03

## 2018-02-01 RX ADMIN — DEXAMETHASONE SODIUM PHOSPHATE 4 MG: 10 INJECTION INTRAMUSCULAR; INTRAVENOUS at 13:09

## 2018-02-01 RX ADMIN — FENTANYL CITRATE 25 MCG: 50 INJECTION INTRAMUSCULAR; INTRAVENOUS at 14:13

## 2018-02-01 RX ADMIN — FENTANYL CITRATE 50 MCG: 50 INJECTION, SOLUTION INTRAMUSCULAR; INTRAVENOUS at 13:31

## 2018-02-01 RX ADMIN — LIDOCAINE HYDROCHLORIDE 50 MG: 10 INJECTION, SOLUTION INFILTRATION; PERINEURAL at 13:03

## 2018-02-01 RX ADMIN — ONDANSETRON 4 MG: 2 INJECTION INTRAMUSCULAR; INTRAVENOUS at 13:29

## 2018-02-01 NOTE — DISCHARGE INSTRUCTIONS
Today you underwent left ureteroscopy  It appears that the stone in the left ureter had passed into the bladder  You have a ureteral stent in place  On Monday morning, remove the tape, pull the string, and removed the stent  Call for follow-up with Dr Shruti Ramos in the next 8-12 weeks  [phone]  Please call for an appointment  Antibiotics and pain medication as prescribed  Ureteroscopy   WHAT YOU NEED TO KNOW:   A ureteroscopy is a procedure to examine in the inside of your urinary tract, which includes your urethra, bladder, ureters, and kidneys  A ureteroscope is a small, thin tube with a light and camera on the end  Ureteroscopy can help your healthcare provider diagnose and treat problems in your urinary tract, such as kidney stones  DISCHARGE INSTRUCTIONS:   Medicine:   · Antibiotics  may be given to treat or prevent an infection  · Take your medicine as directed  Contact your healthcare provider if you think your medicine is not helping or if you have side effects  Tell him or her if you are allergic to any medicine  Keep a list of the medicines, vitamins, and herbs you take  Include the amounts, and when and why you take them  Bring the list or the pill bottles to follow-up visits  Carry your medicine list with you in case of an emergency  Follow up with your healthcare provider as directed:  Write down your questions so you remember to ask them during your visits  Drink liquids as directed  Liquids can help prevent kidney stones and urinary tract infections  Drink water and limit the amount of caffeine you drink  Caffeine may be found in coffee, tea, soda, sports drinks, and foods  Ask your healthcare provider how much liquid to drink each day  Contact your healthcare provider if:   · You have a fever  · You cannot urinate  · You have blood in your urine  · You are vomiting  · You have pain in your abdomen or side       · You have questions or concerns about your condition or care  © 2017 [address] [facility] Information is for End User's use only and may not be sold, redistributed or otherwise used for commercial purposes  All illustrations and images included in CareNotes® are the copyrighted property of [employer] M [employer]  or [employer]  The above information is an  only  It is not intended as medical advice for individual conditions or treatments  Talk to your doctor, nurse or pharmacist before following any medical regimen to see if it is safe and effective for you

## 2018-02-01 NOTE — ANESTHESIA POSTPROCEDURE EVALUATION
Post-Op Assessment Note      CV Status:  Stable    Mental Status:  Somnolent    Hydration Status:  Euvolemic    PONV Controlled:  Controlled    Airway Patency:  Patent    Post Op Vitals Reviewed: Yes          Staff: CRNA           /69 (02/01/18 1341)    Temp 98 5 °F (36 9 °C) (02/01/18 1341)    Pulse 69 (02/01/18 1341)   Resp      SpO2 94 % (02/01/18 1341)

## 2018-02-01 NOTE — OR NURSING
Pt voided small amount of bloody urine prior to discharge  C/o minimal pain with urination  Pain med given as ordered

## 2018-02-01 NOTE — ANESTHESIA PREPROCEDURE EVALUATION
Review of Systems/Medical History          Cardiovascular  Hyperlipidemia,    Pulmonary  Recent URI ,        GI/Hepatic       Kidney stones,        Endo/Other     GYN       Hematology   Musculoskeletal       Neurology   Psychology   Anxiety,              Physical Exam    Airway    Mallampati score: I  TM Distance: >3 FB  Neck ROM: full     Dental   No notable dental hx     Cardiovascular      Pulmonary  Breath sounds clear to auscultation,     Other Findings        Anesthesia Plan  ASA Score- 2     Anesthesia Type- general with ASA Monitors  Additional Monitors:   Airway Plan: LMA  Plan Factors-    Induction- intravenous  Postoperative Plan-     Informed Consent- Anesthetic plan and risks discussed with patient  I personally reviewed this patient with the CRNA  Discussed and agreed on the Anesthesia Plan with the CRNA  Mauri Lopez

## 2018-02-01 NOTE — OP NOTE
OPERATIVE REPORT  PATIENT NAME: Surendra Tee    :  1960  MRN: [id number]  Pt Location: [facility] OR ROOM 05    SURGERY DATE: 2018    Surgeon(s) and Role:     Torsten Miller MD - Primary    Preop Diagnosis:  Kidney stones [N20 0]    Post-Op Diagnosis Codes:     * Kidney stones [N20 0]    Procedure(s) (LRB):  CYSTOSCOPY, LEFT URETEROSCOPY, RETROGRADE PYELOGRAM (Left) , removal of bladder stone, left ureteral stent insertion    Specimen(s):  ID Type Source Tests Collected by Time Destination   1 : Left Ureteral Calculus Calculus Ureter, Left STONE ANALYSIS, TISSUE EXAM Chrissy Tony MD 2018 1328    2 : Bladder Stone Calculus Urinary Bladder STONE ANALYSIS, TISSUE EXAM Chrissy Tony MD 2018 1341    A : Urine Urine Urine, Cystoscopic URINE CULTURE Chrissy Tony MD 2018 1323        Estimated Blood Loss:   Minimal    Drains:  Left 26-6 English double-J ureteral stent with string in place       Anesthesia Type:   General    Operative Indications:  Kidney stones [N20 0]      Operative Findings:  3 mm bladder stone identified  No stones identified within the left ureter on retrograde pyelography or ureteroscopy  Complications:   None    Procedure and Technique:  Og Diaz is a 68-year-old male with a persistent left ureteral calculus measuring approximately 3 mm  It has been present for some time and was confirmed to still be present on a CT scan from 2018  Just prior to surgery the patient continued to complain of some discomfort in his left flank  In addition he had been straining his urine and had not seen a stone  Risk and benefits of cystoscopy with left ureteroscopy were discussed and reviewed  Informed consent was obtained  [facility]  After the smooth induction of general LMA anesthesia, the patient was placed in the dorsal lithotomy position  His genitalia was prepped and draped in sterile fashion    Intravenous antibiotics were administered  A time-out was performed with all members of the operative team confirming the patient's identity, procedure to be performed, laterality of the case  A 22 [place] rigid cystoscope with 30 degree lens was inserted  The patient's bladder was thoroughly inspected  Initially there was no stone visualized  Five Western Sandhya open-ended catheter was placed in the left ureter  Left retrograde pyelography revealed a normal left retrograde pyelogram   There were no filling defects or stones  Based on the small reported size of the stone from the CT scan I made the decision to pass a short semi-rigid ureteral scope into the distal left ureter  There was no stone identified  The scope was passed to the level of the mid to proximal left ureter  There were no stones identified  Contrast was injected and there were no filling defects visualized  The scope was withdrawn through the ureter  The cystoscope was repassed  A 3 mm stone was identified on the contralateral side of the bladder  The stone was removed and sent for specimen  This was likely the stone which had been in the distal left ureter  The wire was then backloaded through the cystoscope and a left 26-6 Western Sandhya double-J ureteral stent was placed in the standard fashion  The proximal coil was appreciated within the left renal pelvis and the distal coil was visualized within the bladder  A string was left in place  The bladder was emptied and the cystoscope was removed  The string was secured to the dorsum of the phallus with Tegaderm  overall the patient tolerated the procedure well  There were no complications    The patient was extubated in the operating room and transferred to the PACU in stable condition at the conclusion the case    Patient Disposition:  PACU stable and extubated    SIGNATURE: Irineo Tian MD  DATE: February 1, 2018  TIME: 1:43 PM

## 2018-02-01 NOTE — H&P (VIEW-ONLY)
1/26/2018    Katja Jarrett  1960  [id number]        Assessment  BPH  Nephrolithiasis       Discussion  Bunny Alejandro is a 59 y o  male being managed by Dr April Triplett  His recent KUB was reviewed and no stone was visualized however it was not visualized on the  view of his CT scan  Since he remains symptomatic we discussed proceeding with surgical intervention  He is agreeable  He will be scheduled for cystoscopy, left ureteroscopy with laser, stone extraction, left retrograde pyelogram, left ureteral stent insertion  This procedure was reviewed with the patient in detail  Risks and benefits were discussed  He will have a repeat CT stone study prior to the procedure to confirm the stone is still present  He was instructed to call if the stone passes  ER precautions again reviewed  Once his stone has been treated we will then discuss further management of his urinary symptoms  All questions were answered  History of Present Illness  59 y o  male with a history of BPH and kidney stones was recently evaluated and complained of left flank pain  CT scan was obtained revealing a 3mm left UVJ calculus  He  presents today for follow up  He states on Sunday he had pain  Resolved with NSAIDS  He denies passage of stone  He has had worsening nocturia and urinary frequency  He is taking Flomax daily  He denies any gross hematuria  He denies any nausea or vomiting  He denies any fevers  Review of Systems  Review of Systems   Constitutional: Negative  HENT: Negative  Respiratory: Negative  Cardiovascular: Negative  Gastrointestinal: Negative  Genitourinary: Positive for frequency  Musculoskeletal: Negative  Skin: Negative  Neurological: Negative  Hematological: Negative            Past Medical History  Past Medical History:   Diagnosis Date    Anxiety     Erectile dysfunction of non-organic origin     Hyperlipidemia        Past Social History  Past Surgical History: Procedure Laterality Date    FOOT SURGERY      HERNIA REPAIR      AR COLONOSCOPY FLX DX W/COLLJ SPEC WHEN PFRMD N/A 1/10/2017    Procedure: COLONOSCOPY;  Surgeon: Americo Buck MD;  Location: [facility] GI LAB; Service: Gastroenterology       Past Family History  No family history on file  Past Social history  Social History     Social History    Marital status:      Spouse name: N/A    Number of children: N/A    Years of education: N/A     Occupational History    Not on file  Social History Main Topics    Smoking status: Never Smoker    Smokeless tobacco: Not on file    Alcohol use No    Drug use: No    Sexual activity: Not on file     Other Topics Concern    Not on file     Social History Narrative    No narrative on file       Current Medications  Current Outpatient Prescriptions   Medication Sig Dispense Refill    Cetirizine HCl (ZYRTEC ALLERGY) 10 MG CAPS Take by mouth daily as needed      DAILY MULTIPLE VITAMINS/IRON PO Take 1 tablet by mouth daily      meloxicam (MOBIC) 15 mg tablet Take 15 mg by mouth daily      simvastatin (ZOCOR) 20 mg tablet Take 1 tablet by mouth daily      simvastatin (ZOCOR) 40 mg tablet Take 40 mg by mouth daily      tamsulosin (FLOMAX) 0 4 mg Take 0 4 mg by mouth       No current facility-administered medications for this visit  Allergies  Allergies   Allergen Reactions    Other      trees    Pollen Extract        Past Medical History, Social History, Family History, medications and allergies were reviewed  Vitals  Vitals:    01/26/18 1338   BP: 130/78   Pulse: 80   Weight: 73 5 kg (162 lb)   Height: 5' 10" (1 778 m)       Physical Exam  Skin: warm, dry, intact  Cardiac: S1S2, HRR, Peripheral edema: positive  Pulmonary: Non-labored breathing  Abdomen: Soft, non-tender, non-distended  Musculoskeletal: AROM with no joint deformity or tenderness    Neurology: alert, oriented x3, affect appropriate  Genitourinary: Negative CVA tenderness, negative suprapubic tenderness            Results  No results found for: PSA  Lab Results   Component Value Date    GLUCOSE 113 08/14/2015    CALCIUM 9 1 08/21/2017     08/21/2017    K 4 4 08/21/2017    CO2 24 08/21/2017     (H) 08/21/2017    BUN 21 08/21/2017    CREATININE 0 89 08/21/2017     Lab Results   Component Value Date    WBC 5 6 02/14/2017    HGB 13 6 02/14/2017    HCT 41 0 02/14/2017    MCV 88 6 02/14/2017     02/14/2017           Procedures

## 2018-02-02 ENCOUNTER — TELEPHONE (OUTPATIENT)
Dept: UROLOGY | Facility: CLINIC | Age: 58
End: 2018-02-02

## 2018-02-02 DIAGNOSIS — N20.0 NEPHROLITHIASIS: Primary | ICD-10-CM

## 2018-02-02 NOTE — TELEPHONE ENCOUNTER
Per Dr Kurt Diaz, patient is to be scheduled for a post op visit in 8-12 weeks with AP and KUB prior    Rx for KUB in EPIC

## 2018-02-02 NOTE — TELEPHONE ENCOUNTER
1st attempt, lm for PT to call back  He has pending appt for 3/13, however this is only about 6 weeks  I asked PT to call back to reschedule that

## 2018-02-02 NOTE — TELEPHONE ENCOUNTER
Spoke with patient and rescheduled ov    Reminded patient that he needs to come for a f/u with full bladder and to have KUB done 1 week prior ov

## 2018-02-02 NOTE — TELEPHONE ENCOUNTER
Received phone call from patient inquiring about stent and removal   He stated that he has burning and some hematuria  Encouraged to increase fluids, take Motrin prn, and heating pad if needed  Patient was given specific instructions for stent removal on Monday and to call with outcome

## 2018-02-03 LAB — BACTERIA UR CULT: NORMAL

## 2018-02-05 NOTE — TELEPHONE ENCOUNTER
Patient called and stated that he had removed his stent intact and is doing well    He has slight hematuria and stated that he will call if he has any questions

## 2018-02-07 ENCOUNTER — TELEPHONE (OUTPATIENT)
Dept: UROLOGY | Facility: AMBULATORY SURGERY CENTER | Age: 58
End: 2018-02-07

## 2018-02-07 VITALS
WEIGHT: 159 LBS | DIASTOLIC BLOOD PRESSURE: 90 MMHG | BODY MASS INDEX: 22.81 KG/M2 | OXYGEN SATURATION: 99 % | TEMPERATURE: 98 F | HEART RATE: 89 BPM | SYSTOLIC BLOOD PRESSURE: 121 MMHG | RESPIRATION RATE: 18 BRPM

## 2018-02-07 NOTE — TELEPHONE ENCOUNTER
I received a call from patient stating that he removed his stent on Monday and has not had any pain until last night at 1 am  He c/o flank pain  I inquired about his fluid intake  He stated that he did cut back on fluids because he was out yesterday  He stated that the pain was relieved with Aleve  I instructed him to increase liquids and call if no improvement  I explained to him that it take several weeks for the swelling to decrease from surgery and to keep well hydrated    He will call if no improvement

## 2018-02-08 ENCOUNTER — TELEPHONE (OUTPATIENT)
Dept: UROLOGY | Facility: CLINIC | Age: 58
End: 2018-02-08

## 2018-02-08 ENCOUNTER — HOSPITAL ENCOUNTER (EMERGENCY)
Facility: HOSPITAL | Age: 58
Discharge: HOME | End: 2018-02-08
Attending: EMERGENCY MEDICINE | Admitting: EMERGENCY MEDICINE
Payer: COMMERCIAL

## 2018-02-08 ENCOUNTER — APPOINTMENT (EMERGENCY)
Dept: RADIOLOGY | Facility: HOSPITAL | Age: 58
End: 2018-02-08
Payer: COMMERCIAL

## 2018-02-08 DIAGNOSIS — N20.0 KIDNEY STONE ON LEFT SIDE: Primary | ICD-10-CM

## 2018-02-08 LAB
ANION GAP SERPL CALCULATED.3IONS-SCNC: 7 MMOL/L (ref 4–13)
BACTERIA UR QL AUTO: ABNORMAL /HPF
BILIRUB UR QL STRIP: NEGATIVE
BUN SERPL-MCNC: 26 MG/DL (ref 5–25)
CALCIUM SERPL-MCNC: 8.8 MG/DL (ref 8.3–10.1)
CHLORIDE SERPL-SCNC: 104 MMOL/L (ref 100–108)
CLARITY UR: CLEAR
CLARITY, POC: CLEAR
CO2 SERPL-SCNC: 26 MMOL/L (ref 21–32)
COLOR UR: YELLOW
COLOR, POC: YELLOW
CREAT SERPL-MCNC: 1.33 MG/DL (ref 0.6–1.3)
EXT BILIRUBIN, UA: NEGATIVE
EXT BLOOD URINE: NORMAL
EXT GLUCOSE, UA: NEGATIVE
EXT KETONES: NEGATIVE
EXT NITRITE, UA: NEGATIVE
EXT PH, UA: 5.5
EXT PROTEIN, UA: NEGATIVE
EXT SPECIFIC GRAVITY, UA: 1.02
EXT UROBILINOGEN: 0.2
GFR SERPL CREATININE-BSD FRML MDRD: 58 ML/MIN/1.73SQ M
GLUCOSE SERPL-MCNC: 94 MG/DL (ref 65–140)
GLUCOSE UR STRIP-MCNC: NEGATIVE MG/DL
HGB UR QL STRIP.AUTO: ABNORMAL
KETONES UR STRIP-MCNC: NEGATIVE MG/DL
LEUKOCYTE ESTERASE UR QL STRIP: ABNORMAL
NITRITE UR QL STRIP: NEGATIVE
NON-SQ EPI CELLS URNS QL MICRO: ABNORMAL /HPF
PH UR STRIP.AUTO: 5.5 [PH] (ref 4.5–8)
POTASSIUM SERPL-SCNC: 4.1 MMOL/L (ref 3.5–5.3)
PROT UR STRIP-MCNC: NEGATIVE MG/DL
RBC #/AREA URNS AUTO: ABNORMAL /HPF
SODIUM SERPL-SCNC: 137 MMOL/L (ref 136–145)
SP GR UR STRIP.AUTO: 1.02 (ref 1–1.03)
UROBILINOGEN UR QL STRIP.AUTO: 0.2 E.U./DL
WBC # BLD EST: NORMAL 10*3/UL
WBC #/AREA URNS AUTO: ABNORMAL /HPF

## 2018-02-08 PROCEDURE — 81002 URINALYSIS NONAUTO W/O SCOPE: CPT | Performed by: EMERGENCY MEDICINE

## 2018-02-08 PROCEDURE — 74176 CT ABD & PELVIS W/O CONTRAST: CPT

## 2018-02-08 PROCEDURE — 96374 THER/PROPH/DIAG INJ IV PUSH: CPT

## 2018-02-08 PROCEDURE — 36415 COLL VENOUS BLD VENIPUNCTURE: CPT | Performed by: EMERGENCY MEDICINE

## 2018-02-08 PROCEDURE — 96361 HYDRATE IV INFUSION ADD-ON: CPT

## 2018-02-08 PROCEDURE — 99284 EMERGENCY DEPT VISIT MOD MDM: CPT

## 2018-02-08 PROCEDURE — 81001 URINALYSIS AUTO W/SCOPE: CPT

## 2018-02-08 PROCEDURE — 87086 URINE CULTURE/COLONY COUNT: CPT

## 2018-02-08 PROCEDURE — 80048 BASIC METABOLIC PNL TOTAL CA: CPT | Performed by: EMERGENCY MEDICINE

## 2018-02-08 RX ORDER — TAMSULOSIN HYDROCHLORIDE 0.4 MG/1
0.4 CAPSULE ORAL
Qty: 14 CAPSULE | Refills: 0 | Status: SHIPPED | OUTPATIENT
Start: 2018-02-08 | End: 2018-02-13 | Stop reason: SDUPTHER

## 2018-02-08 RX ORDER — ACETAMINOPHEN 500 MG
1000 TABLET ORAL EVERY 6 HOURS PRN
Qty: 30 TABLET | Refills: 0 | Status: SHIPPED | OUTPATIENT
Start: 2018-02-08 | End: 2018-05-18

## 2018-02-08 RX ORDER — SIMVASTATIN 20 MG
20 TABLET ORAL
COMMUNITY
End: 2018-02-12 | Stop reason: SDUPTHER

## 2018-02-08 RX ORDER — OXYCODONE HYDROCHLORIDE 5 MG/1
5 TABLET ORAL EVERY 4 HOURS PRN
Qty: 10 TABLET | Refills: 0 | Status: SHIPPED | OUTPATIENT
Start: 2018-02-08 | End: 2018-09-05 | Stop reason: ALTCHOICE

## 2018-02-08 RX ORDER — ACETAMINOPHEN 325 MG/1
975 TABLET ORAL ONCE
Status: COMPLETED | OUTPATIENT
Start: 2018-02-08 | End: 2018-02-08

## 2018-02-08 RX ORDER — NAPROXEN 500 MG/1
500 TABLET ORAL 2 TIMES DAILY WITH MEALS
Qty: 30 TABLET | Refills: 0 | Status: SHIPPED | OUTPATIENT
Start: 2018-02-08 | End: 2018-09-05 | Stop reason: ALTCHOICE

## 2018-02-08 RX ORDER — KETOROLAC TROMETHAMINE 30 MG/ML
15 INJECTION, SOLUTION INTRAMUSCULAR; INTRAVENOUS ONCE
Status: COMPLETED | OUTPATIENT
Start: 2018-02-08 | End: 2018-02-08

## 2018-02-08 RX ADMIN — ACETAMINOPHEN 975 MG: 325 TABLET, FILM COATED ORAL at 00:57

## 2018-02-08 RX ADMIN — SODIUM CHLORIDE 1000 ML: 0.9 INJECTION, SOLUTION INTRAVENOUS at 00:55

## 2018-02-08 RX ADMIN — KETOROLAC TROMETHAMINE 15 MG: 30 INJECTION, SOLUTION INTRAMUSCULAR at 00:55

## 2018-02-08 NOTE — DISCHARGE INSTRUCTIONS
Kidney Stones   WHAT YOU NEED TO KNOW:   Kidney stones form in the urinary system when the water and waste in your urine are out of balance  When this happens, certain types of waste crystals separate from the urine  The crystals build up and form kidney stones  You may have 1 or more kidney stones  DISCHARGE INSTRUCTIONS:   Return to the emergency department if:   · You have vomiting that is not relieved by medicine  Contact your healthcare provider if:   · You have a fever  · You have trouble passing urine  · You see blood in your urine  · You have severe pain  · You have any questions or concerns about your condition or care  Medicines:   · NSAIDs , such as ibuprofen, help decrease swelling, pain, and fever  This medicine is available with or without a doctor's order  NSAIDs can cause stomach bleeding or kidney problems in certain people  If you take blood thinner medicine, always ask your healthcare provider if NSAIDs are safe for you  Always read the medicine label and follow directions  · Prescription medicine  may be given  Ask how to take this medicine safely  · Medicines  to balance your electrolytes may be needed  · Take your medicine as directed  Contact your healthcare provider if you think your medicine is not helping or if you have side effects  Tell him or her if you are allergic to any medicine  Keep a list of the medicines, vitamins, and herbs you take  Include the amounts, and when and why you take them  Bring the list or the pill bottles to follow-up visits  Carry your medicine list with you in case of an emergency  Follow up with your healthcare provider as directed: You may need to return for more tests  Write down your questions so you remember to ask them during your visits  Self-care:   · Drink plenty of liquids  Your healthcare provider may tell you to drink at least 8 to 12 (eight-ounce) cups of liquids each day   This helps flush out the kidney stones when you urinate  Water is the best liquid to drink  · Strain your urine every time you go to the bathroom  Urinate through a strainer or a piece of thin cloth to catch the stones  Take the stones to your healthcare provider so they can be sent to the lab for tests  This will help your healthcare providers plan the best treatment for you  · Eat a variety of healthy foods  Healthy foods include fruits, vegetables, whole-grain breads, low-fat dairy products, beans, and fish  You may need to limit how much sodium (salt) or protein you eat  Ask for information about the best foods for you  · Stay active  Your stones may pass more easily by if you stay active  Ask about the best activities for you  After you pass your kidney stones:  Once you have passed your kidney stones, your healthcare provider may  order a 24-hour urine test  Results from a 24-hour urine test will help your healthcare provider plan ways to prevent more stones from forming  If you are told to do a 24-hour test, your healthcare provider will give you more instructions  © 2017 [address] [facility] Information is for End User's use only and may not be sold, redistributed or otherwise used for commercial purposes  All illustrations and images included in CareNotes® are the copyrighted property of A D A M [employer]  or [employer]  The above information is an  only  It is not intended as medical advice for individual conditions or treatments  Talk to your doctor, nurse or pharmacist before following any medical regimen to see if it is safe and effective for you

## 2018-02-08 NOTE — ED PROVIDER NOTES
History  Chief Complaint   Patient presents with    Flank Pain     last thursday had lithotripsy with stent, stent removed Monday, awoke 0130 yesterday with severe flank pain, here to make sure he does not have an infection or dehydration     HPI  26-year-old male past history recent left ureteral stone status post ureteroscopy/retrograde pyelogram with ureteral stent placement presents with complaints of left flank pain for the last day  Patient says he has an 8/10 pain in his left flank radiating to the groin that feels similar to when he initially presented several days ago and was diagnosed with a kidney stone  on patient's prior CT renal stone protocol he had several intrarenal stones as while 1 of them 4 mm in the left kidney  Patient does have a history of prior kidney stones as well  He otherwise denies dysuria, fevers, chills, nausea, vomiting, diarrhea  He called the urology office earlier today and spoke with someone he told that this is likely secondary to him being dehydrated  Patient says he has been drinking lots of water and his symptoms are still not improving  He has been treating his pain with Motrin with only mild relief  Twelve systems reviewed otherwise negative except as stated in HPI  Impression and plan 26-year-old male history of recent kidney stone status post removal and stent placement presents with left flank pain  Prior CT renal stone protocol did show other stones in the kidney  Concern for recurrence of ureterolithiasis  Will check BMP to assess renal function, treat symptoms with IV fluids, Toradol, Tylenol since patient drove himself, check a urinalysis and a CT renal stone protocol  Prior to Admission Medications   Prescriptions Last Dose Informant Patient Reported? Taking?    Cetirizine HCl (ZYRTEC ALLERGY) 10 MG CAPS Past Month at as needed Self Yes Yes   Sig: Take by mouth daily as needed   DAILY MULTIPLE VITAMINS/IRON PO 2/7/2018 at morning Self Yes Yes   Sig: Take 1 tablet by mouth daily   HYDROcodone-acetaminophen (NORCO) 5-325 mg per tablet 2/7/2018 at evening  No Yes   Sig: Take 2 tablets by mouth every 6 (six) hours as needed for pain for up to 10 days Max Daily Amount: 8 tablets   simvastatin (ZOCOR) 20 mg tablet 2/7/2018 at bedtime  Yes Yes   Sig: Take 20 mg by mouth daily at bedtime      Facility-Administered Medications: None       Past Medical History:   Diagnosis Date    Anxiety     Erectile dysfunction of non-organic origin     Hyperlipidemia     Kidney stone        Past Surgical History:   Procedure Laterality Date    FOOT SURGERY      HAND SURGERY Left     HERNIA REPAIR      AK COLONOSCOPY FLX DX W/COLLJ SPEC WHEN PFRMD N/A 1/10/2017    Procedure: COLONOSCOPY;  Surgeon: Irineo Villafana MD;  Location: [facility] GI LAB; Service: Gastroenterology    AK CYSTO/URETERO W/LITHOTRIPSY &INDWELL STENT INSRT Left 2/1/2018    Procedure: CYSTOSCOPY, LEFT URETEROSCOPY, RETROGRADE PYELOGRAM;  Surgeon: Lizbeth Murillo MD;  Location: [facility]  MAIN [facility];  Service: Urology       History reviewed  No pertinent family history  I have reviewed and agree with the history as documented  Social History   Substance Use Topics    Smoking status: Never Smoker    Smokeless tobacco: Never Used    Alcohol use Yes      Comment: occasional        Review of Systems   Constitutional: Positive for activity change and appetite change  Negative for chills and fever  HENT: Negative for trouble swallowing and voice change  Eyes: Negative for photophobia  Respiratory: Negative for shortness of breath  Cardiovascular: Negative for chest pain, palpitations and leg swelling  Gastrointestinal: Negative for abdominal pain, diarrhea, nausea and vomiting  Endocrine: Negative for polyuria  Genitourinary: Positive for flank pain and frequency   Negative for decreased urine volume, difficulty urinating, discharge, dysuria, enuresis, genital sores, hematuria, penile pain, penile swelling, scrotal swelling, testicular pain and urgency  Musculoskeletal: Negative for back pain  Skin: Negative for rash  Allergic/Immunologic: Negative  Neurological: Negative for dizziness, tremors, seizures, syncope, facial asymmetry, speech difficulty, weakness, light-headedness, numbness and headaches  Hematological: Negative for adenopathy  Does not bruise/bleed easily  Psychiatric/Behavioral: Negative for agitation  Physical Exam  ED Triage Vitals [02/07/18 2349]   Temperature Pulse Respirations Blood Pressure SpO2   98 °F (36 7 °C) 89 18 121/90 99 %      Temp Source Heart Rate Source Patient Position - Orthostatic VS BP Location FiO2 (%)   Oral Monitor Sitting Left arm --      Pain Score       6           Orthostatic Vital Signs  Vitals:    02/07/18 2349   BP: 121/90   Pulse: 89   Patient Position - Orthostatic VS: Sitting       Physical Exam   Constitutional: He is oriented to person, place, and time  He appears well-developed and well-nourished  No distress  HENT:   Head: Normocephalic and atraumatic  Right Ear: No hemotympanum  Left Ear: No hemotympanum  Nose: No nasal septal hematoma  Mouth/Throat: Uvula is midline and oropharynx is clear and moist  No oropharyngeal exudate  Eyes: EOM are normal  Pupils are equal, round, and reactive to light  Right eye exhibits no discharge  Left eye exhibits no discharge  Neck: Normal range of motion  Neck supple  No JVD present  No tracheal deviation present  No thyromegaly present  Cardiovascular: Normal rate, regular rhythm, normal heart sounds and intact distal pulses  Exam reveals no gallop and no friction rub  No murmur heard  Pulmonary/Chest: Effort normal and breath sounds normal  No stridor  No respiratory distress  He has no wheezes  He has no rales  He exhibits no tenderness  Abdominal: Soft  Bowel sounds are normal  He exhibits no distension and no mass  There is no tenderness (L flank, L cva)   There is no rebound and no guarding  No hernia  Musculoskeletal: Normal range of motion  He exhibits no edema  Lymphadenopathy:     He has no cervical adenopathy  Neurological: He is alert and oriented to person, place, and time  He has normal strength and normal reflexes  He is not disoriented  No cranial nerve deficit or sensory deficit  GCS eye subscore is 4  GCS verbal subscore is 5  GCS motor subscore is 6  Reflex Scores:       Patellar reflexes are 2+ on the right side and 2+ on the left side  Achilles reflexes are 2+ on the right side and 2+ on the left side  Cn 2-12 grossly intact  No pronator drift  Normal gait  Normal strength/sensation   Skin: Skin is warm and dry  Capillary refill takes less than 2 seconds  He is not diaphoretic  No erythema  Psychiatric: He has a normal mood and affect  Nursing note and vitals reviewed  ED Medications  Medications   sodium chloride 0 9 % bolus 1,000 mL (0 mL Intravenous Stopped 2/8/18 0210)   ketorolac (TORADOL) injection 15 mg (15 mg Intravenous Given 2/8/18 0055)   acetaminophen (TYLENOL) tablet 975 mg (975 mg Oral Given 2/8/18 0057)       Diagnostic Studies  Results Reviewed     Procedure Component Value Units Date/Time    Basic metabolic panel [[id number]]  (Abnormal) Collected:  02/08/18 0052    Lab Status:  Final result Specimen:  Blood from Arm, Left Updated:  02/08/18 0139     Sodium 137 mmol/L      Potassium 4 1 mmol/L      Chloride 104 mmol/L      CO2 26 mmol/L      Anion Gap 7 mmol/L      BUN 26 (H) mg/dL      Creatinine 1 33 (H) mg/dL      Glucose 94 mg/dL      Calcium 8 8 mg/dL      eGFR 58 ml/min/1 73sq m     Narrative:         National Kidney Disease Education Program recommendations are as follows:  GFR calculation is accurate only with a steady state creatinine  Chronic Kidney disease less than 60 ml/min/1 73 sq  meters  Kidney failure less than 15 ml/min/1 73 sq  meters      Urine Microscopic [[id number]]  (Abnormal) Collected:  02/08/18 0007    Lab Status: Final result Specimen:  Urine from Urine, Clean Catch Updated:  02/08/18 0113     RBC, UA 20-30 (A) /hpf      WBC, UA 10-20 (A) /hpf      Epithelial Cells None Seen /hpf      Bacteria, UA None Seen /hpf     Urine culture [[id number]] Collected:  02/08/18 0007    Lab Status: In process Specimen:  Urine from Urine, Clean Catch Updated:  02/08/18 0112    ED Urine Macroscopic [[id number]]  (Abnormal) Collected:  02/08/18 0007    Lab Status:  Final result Specimen:  Urine Updated:  02/08/18 0012     Color, UA Yellow     Clarity, UA Clear     pH, UA 5 5     Leukocytes, UA Small (A)     Nitrite, UA Negative     Protein, UA Negative mg/dl      Glucose, UA Negative mg/dl      Ketones, UA Negative mg/dl      Urobilinogen, UA 0 2 E U /dl      Bilirubin, UA Negative     Blood, UA Large (A)     Specific [place], UA 1 025    Narrative:       CLINITEK RESULT    POCT urinalysis dipstick [[id number]]  (Normal) Resulted:  02/08/18 0011    Lab Status:  Final result Updated:  02/08/18 0012     Color, UA yellow     Clarity, UA clear     EXT Glucose, UA negative     EXT Bilirubin, UA (Ref: Negative) negative     EXT Ketones, UA (Ref: Negative) negative     EXT Spec Grav, UA 1 025     EXT Blood, UA (Ref: Negative) large     EXT pH, UA 5 5     EXT Protein, UA (Ref: Negative) negative     EXT Urobilinogen, UA (Ref: 0 2- 1 0) 0 2     EXT Leukocytes, UA (Ref: Negative) small     EXT Nitrite, UA (Ref: Negative) negative                 CT renal stone study abdomen pelvis without contrast   Final Result by Seng Cifuentes MD (02/08 0127)      Bilateral nephrolithiasis and 5 mm obstructive calculus at the left ureterovesicular junction with associated moderate hydroureteronephrosis  Workstation performed: [id number]               Procedures  Procedures      Phone Consults  ED Phone Contact    ED Course  ED Course as of Feb 08 1121   Thu Feb 08, 2018   0116 RBC, UA: (!) 20-30   0138 Pt has 5 mm stone at distal UVJ with mod hydro   We will write rx for tylenol, naproxen, flomax to take scheduled and PRN oxycodone for breakthrough pain  Strict return precautions discussed  0140 Creatinine: (!) 1 33   0151 Pt  Given filter for stone  We will dc  Strict return precautions discussed  MDM  CritCare Time    Disposition  Final diagnoses:   Kidney stone on left side     Time reflects when diagnosis was documented in both MDM as applicable and the Disposition within this note     Time User Action Codes Description Comment    2/8/2018  1:36 AM Roddygui SANTOS Add [N20 0] Kidney stone on left side       ED Disposition     ED Disposition Condition Comment    Discharge  Kathyquynh Johnny Mosley discharge to home/self care      Condition at discharge: Good        Follow-up Information     Follow up With Specialties Details Why Contact Info Additional Information    Comfort Gilliland MD Urology In 3 days  [address]  [address] [place] [address] [facility] [address] Emergency Department Emergency Medicine  If symptoms worsen [address]  [phone]  ED, [address] [place], [place], [address]        Discharge Medication List as of 2/8/2018  1:38 AM      START taking these medications    Details   acetaminophen (TYLENOL) 500 mg tablet Take 2 tablets (1,000 mg total) by mouth every 6 (six) hours as needed for mild pain for up to 30 doses, Starting u 2/8/2018, Print      naproxen (NAPROSYN) 500 mg tablet Take 1 tablet (500 mg total) by mouth 2 (two) times a day with meals, Starting Thu 2/8/2018, Print      oxyCODONE (ROXICODONE) 5 mg immediate release tablet Take 1 tablet (5 mg total) by mouth every 4 (four) hours as needed for severe pain for up to 10 doses Max Daily Amount: 30 mg, Starting Thu 2/8/2018, Print      tamsulosin (FLOMAX) 0 4 mg Take 1 capsule (0 4 mg total) by mouth daily with dinner for 14 doses, Starting Thu 2/8/2018, Until Thu 2/22/2018, Print         CONTINUE these medications which have NOT CHANGED    Details   Cetirizine HCl (ZYRTEC ALLERGY) 10 MG CAPS Take by mouth daily as needed, Historical Med      DAILY MULTIPLE VITAMINS/IRON PO Take 1 tablet by mouth daily, Historical Med      HYDROcodone-acetaminophen (NORCO) 5-325 mg per tablet Take 2 tablets by mouth every 6 (six) hours as needed for pain for up to 10 days Max Daily Amount: 8 tablets, Starting Thu 2/1/2018, Until Sun 2/11/2018, Print      simvastatin (ZOCOR) 20 mg tablet Take 20 mg by mouth daily at bedtime, Historical Med           No discharge procedures on file  ED Provider  Attending physically available and evaluated Isiah Ga I managed the patient along with the ED Attending      Electronically Signed by         Grace Kerns MD  02/08/18 9656

## 2018-02-08 NOTE — ED ATTENDING ATTESTATION
IJeet DO, saw and evaluated the patient  I have discussed the patient with the resident/non-physician practitioner and agree with the resident's/non-physician practitioner's findings, Plan of Care, and MDM as documented in the resident's/non-physician practitioner's note, except where noted  All available labs and Radiology studies were reviewed  At this point I agree with the current assessment done in the Emergency Department  I have conducted an independent evaluation of this patient a history and physical is as follows:      Critical Care Time  CritCare Time    Procedures     To the ED with with left flank pain  Recent stent removal on Monday  Recent CT with 4mm stone still on the left  No fever  Motrin not helping pain  Exm; oral moist  Lungs; cta  Mild L cvat  Pln: after discussion with pt will get CT for stone

## 2018-02-08 NOTE — TELEPHONE ENCOUNTER
Received phone call from patient stating that he ended up in the ER last night  He had extreme pain that was not controlled by 600mg of Motrin  He stated that they did CT and he has a stone  Per Dr Gaby Sykes, patient aware that he dropped another stone  Patient will increase fluids, take pain meds as prescribed, take Tamsulosin and f/u ov tomorrow  He will also strain his urine

## 2018-02-09 ENCOUNTER — OFFICE VISIT (OUTPATIENT)
Dept: UROLOGY | Facility: AMBULATORY SURGERY CENTER | Age: 58
End: 2018-02-09
Payer: COMMERCIAL

## 2018-02-09 VITALS
HEART RATE: 82 BPM | WEIGHT: 159 LBS | DIASTOLIC BLOOD PRESSURE: 70 MMHG | HEIGHT: 70 IN | BODY MASS INDEX: 22.76 KG/M2 | SYSTOLIC BLOOD PRESSURE: 122 MMHG

## 2018-02-09 DIAGNOSIS — N20.0 KIDNEY STONES: Primary | ICD-10-CM

## 2018-02-09 LAB — BACTERIA UR CULT: NORMAL

## 2018-02-09 PROCEDURE — 99213 OFFICE O/P EST LOW 20 MIN: CPT | Performed by: UROLOGY

## 2018-02-09 NOTE — PROGRESS NOTES
2/9/2018    Modesta Murdock  1960  [id number]        Assessment  Nephrolithiasis s/p left ureteroscopy (2/1/2018)  Left ureteral stone    Discussion  Recent CT scan results were reviewed with the patient  He has a 5mm left UVJ calculus with hydronephrosis  He also has bilateral nonobstructing stones  We discussed proceeding with attempted spontaneous passage  He is agreeable  He will continue to take Flomax daily  He was encouraged to increase his water consumption  Pain controlled with NSAIDs and he discussed  ER precautions were also reviewed  He will return in 2 weeks with a renal bladder ultrasound and KUB  He will continue to strain his urine  He was instructed to call if the stone does past   All questions were answered  History of Present Illness  59 y o  male with a history of left ureteral stone status post left ureteroscopy (02/01/2018) presents today for follow up  At time of surgery this stone was found to be in the bladder  Ureteroscopy was performed with no ureteral obstruction  The stent string was placed  Patient then removed the stent on his own without any issues  He states this past Wednesday he developed severe left flank pain  This persisted and worsened  He returned to the ER was found to have a 5 mm left UVJ stone  He was prescribed Flomax and naproxen  He had slight discomfort yesterday but no pain since  He denies any nausea or vomiting  He denies any fevers  He denies any lower urinary tract symptoms  Review of Systems  Review of Systems   Constitutional: Negative  HENT: Negative  Respiratory: Negative  Cardiovascular: Negative  Gastrointestinal: Negative  Genitourinary: Negative  Musculoskeletal: Negative  Skin: Negative  Neurological: Negative  Hematological: Negative          Past Medical History  Past Medical History:   Diagnosis Date    Anxiety     Erectile dysfunction of non-organic origin     Hyperlipidemia     Kidney stone        Past Surgical History  Past Surgical History:   Procedure Laterality Date    FOOT SURGERY      HAND SURGERY Left     HERNIA REPAIR      NE COLONOSCOPY FLX DX W/COLLJ SPEC WHEN PFRMD N/A 1/10/2017    Procedure: COLONOSCOPY;  Surgeon: Arianna Costello MD;  Location: [facility] GI LAB; Service: Gastroenterology    NE CYSTO/URETERO W/LITHOTRIPSY &INDWELL STENT INSRT Left 2/1/2018    Procedure: CYSTOSCOPY, LEFT URETEROSCOPY, RETROGRADE PYELOGRAM;  Surgeon: Zoya Gann MD;  Location: [facility] MAIN OR;  Service: Urology        Past Family History  No family history on file  Past Social history  Social History     Social History    Marital status:      Spouse name: N/A    Number of children: N/A    Years of education: N/A     Occupational History    Not on file       Social History Main Topics    Smoking status: Never Smoker    Smokeless tobacco: Never Used    Alcohol use Yes      Comment: occasional    Drug use: No    Sexual activity: Not on file     Other Topics Concern    Not on file     Social History Narrative    No narrative on file       Current Medications  Current Outpatient Prescriptions   Medication Sig Dispense Refill    acetaminophen (TYLENOL) 500 mg tablet Take 2 tablets (1,000 mg total) by mouth every 6 (six) hours as needed for mild pain for up to 30 doses 30 tablet 0    DAILY MULTIPLE VITAMINS/IRON PO Take 1 tablet by mouth daily      naproxen (NAPROSYN) 500 mg tablet Take 1 tablet (500 mg total) by mouth 2 (two) times a day with meals 30 tablet 0    simvastatin (ZOCOR) 20 mg tablet Take 20 mg by mouth daily at bedtime      tamsulosin (FLOMAX) 0 4 mg Take 1 capsule (0 4 mg total) by mouth daily with dinner for 14 doses 14 capsule 0    Cetirizine HCl (ZYRTEC ALLERGY) 10 MG CAPS Take by mouth daily as needed      HYDROcodone-acetaminophen (NORCO) 5-325 mg per tablet Take 2 tablets by mouth every 6 (six) hours as needed for pain for up to 10 days Max Daily Amount: 8 tablets 12 tablet 0    oxyCODONE (ROXICODONE) 5 mg immediate release tablet Take 1 tablet (5 mg total) by mouth every 4 (four) hours as needed for severe pain for up to 10 doses Max Daily Amount: 30 mg 10 tablet 0     No current facility-administered medications for this visit  Allergies  Allergies   Allergen Reactions    Other      trees    Pollen Extract        Past Medical History, Social History, Family History, medications and allergies were reviewed  Vitals  Vitals:    02/09/18 1157   BP: 122/70   Pulse: 82   Weight: 72 1 kg (159 lb)   Height: 5' 10" (1 778 m)       Physical Exam  Skin: warm, dry, intact  Pulmonary: Non-labored breathing  Abdomen: Soft, non-tender, non-distended  Musculoskeletal: AROM with no joint deformity or tenderness  Neurology: Alert and oriented      Results    Lab Results   Component Value Date    GLUCOSE 94 02/08/2018    CALCIUM 8 8 02/08/2018     02/08/2018    K 4 1 02/08/2018    CO2 26 02/08/2018     02/08/2018    BUN 26 (H) 02/08/2018    CREATININE 1 33 (H) 02/08/2018     Lab Results   Component Value Date    WBC 5 6 02/14/2017    HGB 13 6 02/14/2017    HCT 41 0 02/14/2017    MCV 88 6 02/14/2017     02/14/2017       FINDINGS:     RIGHT KIDNEY AND URETER:  Multiple scattered nephrolithiasis measuring 2 to 4 mm  There is mild fullness of the right collecting system without constantin hydronephrosis  No hydroureter  No perinephric collection      LEFT KIDNEY AND URETER:  There are multiple scattered renal calculi measuring 2 to 4 mm  There is mild left hydronephrosis and moderate left hydroureter  There is a 5 mm calculus at the left ureterovesicular junction  No perinephric collection      URINARY BLADDER:  Collapsed and poorly evaluated      No significant abnormality in the visualized lung bases    Limited low radiation dose noncontrast CT evaluation demonstrates no clinically significant abnormality of liver, spleen, pancreas, or adrenal glands  No calcified gallstones or gallbladder wall thickening noted  No ascites or lymphadenopathy  There is scattered colonic diverticulosis  The appendix is visualized  No evidence of appendicitis  Mild to moderate calcifications of the abdominal aorta  Degenerative changes of the osseous structures  Please 1 retrolisthesis of L3 on L4, stable  No aggressive osseous lesion         IMPRESSION:     Bilateral nephrolithiasis and 5 mm obstructive calculus at the left ureterovesicular junction with associated moderate hydroureteronephrosis

## 2018-02-12 ENCOUNTER — TELEPHONE (OUTPATIENT)
Dept: UROLOGY | Facility: CLINIC | Age: 58
End: 2018-02-12

## 2018-02-12 DIAGNOSIS — E78.2 MIXED HYPERLIPIDEMIA: Primary | ICD-10-CM

## 2018-02-12 LAB
CA PHOS MFR STONE: 5 %
CALCIUM OXALATE DIHYDRATE MFR STONE IR: 3 %
COLOR STONE: NORMAL
COM MFR STONE: 67 %
COMMENT-STONE3: NORMAL
COMPOSITION: NORMAL
LABORATORY COMMENT REPORT: NORMAL
NA URATE MFR STONE IR: 25 %
NIDUS STONE QL: NORMAL
PHOTO: NORMAL
SIZE STONE: NORMAL MM
STONE ANALYSIS-IMP: NORMAL
STONE ANALYSIS-IMP: NORMAL
WT STONE: 8.6 MG

## 2018-02-12 RX ORDER — SIMVASTATIN 20 MG
TABLET ORAL
Qty: 30 TABLET | Refills: 4 | Status: SHIPPED | OUTPATIENT
Start: 2018-02-12 | End: 2018-05-18 | Stop reason: DRUGHIGH

## 2018-02-12 NOTE — TELEPHONE ENCOUNTER
Carlito Grimes is aware to dc surgery  PC from patient calling to say that he passed his stone on Saturday so he doesn't need his surgery  Do you want me to inform Carlito Grimes of this?

## 2018-02-13 DIAGNOSIS — N20.0 KIDNEY STONE ON LEFT SIDE: ICD-10-CM

## 2018-02-13 RX ORDER — TAMSULOSIN HYDROCHLORIDE 0.4 MG/1
CAPSULE ORAL
Qty: 30 CAPSULE | Refills: 0 | Status: SHIPPED | OUTPATIENT
Start: 2018-02-13 | End: 2018-05-18

## 2018-03-09 ENCOUNTER — TELEPHONE (OUTPATIENT)
Dept: UROLOGY | Facility: AMBULATORY SURGERY CENTER | Age: 58
End: 2018-03-09

## 2018-03-09 DIAGNOSIS — N39.0 URINARY TRACT INFECTION WITHOUT HEMATURIA, SITE UNSPECIFIED: Primary | ICD-10-CM

## 2018-03-09 NOTE — TELEPHONE ENCOUNTER
Received call from patient stating that he thinks he has UTI  He c/o burning with urination, bladder discomfort, burning at tip of penis, frequency, urgency x's 1 week which has been getting worse  He denies fever or chills  Rx for ua, c/s faxed to [phone] @ [phone]    Patient is normally seen in [place]

## 2018-03-10 LAB
APPEARANCE UR: CLEAR
BACTERIA UR QL AUTO: NORMAL /HPF
BILIRUB UR QL STRIP: NEGATIVE
COLOR UR: YELLOW
GLUCOSE UR QL STRIP: NEGATIVE
HGB UR QL STRIP: NEGATIVE
HYALINE CASTS #/AREA URNS LPF: NORMAL /LPF
KETONES UR QL STRIP: NEGATIVE
LEUKOCYTE ESTERASE UR QL STRIP: NEGATIVE
NITRITE UR QL STRIP: NEGATIVE
PH UR STRIP: 5.5 [PH] (ref 5–8)
PROT UR QL STRIP: NEGATIVE
RBC #/AREA URNS HPF: NORMAL /HPF
SP GR UR STRIP: 1.02 (ref 1–1.03)
SQUAMOUS #/AREA URNS HPF: NORMAL /HPF
WBC #/AREA URNS HPF: NORMAL /HPF

## 2018-03-12 NOTE — TELEPHONE ENCOUNTER
No evidence of infection on recent urine testing  If patient is still symptomatic then offer OV to discuss further testing and treatment options

## 2018-03-12 NOTE — TELEPHONE ENCOUNTER
Spoke with patient and informed him of no UTI  Advised him if still symptomatic, he should set up office visit  Patient states if he is still experiencing the penile discomfort next week, he will call back for appointment  Reminded patient to continue to hydrate aggressively with water  Patient verbalized understanding

## 2018-04-16 ENCOUNTER — TELEPHONE (OUTPATIENT)
Dept: UROLOGY | Facility: CLINIC | Age: 58
End: 2018-04-16

## 2018-04-16 NOTE — TELEPHONE ENCOUNTER
Patient left message, he received call with instructions to come for appt tomorrow with full bladder and about an xray, attempted to reach patient back, left messageadvised he is scheduled for follow up with Uroflow/pvr at visit and to come with full bladder in order to give urine sample also advised there is order in [facility] for KUB to be done as well to follow up on his stone and if he did not have xray done he can call main number to reschedule appt in order to have xray done

## 2018-05-14 ENCOUNTER — APPOINTMENT (OUTPATIENT)
Dept: RADIOLOGY | Age: 58
End: 2018-05-14
Payer: COMMERCIAL

## 2018-05-14 DIAGNOSIS — N20.0 KIDNEY STONES: ICD-10-CM

## 2018-05-14 PROCEDURE — 74018 RADEX ABDOMEN 1 VIEW: CPT

## 2018-05-17 DIAGNOSIS — Z12.5 SCREENING FOR PROSTATE CANCER: ICD-10-CM

## 2018-05-17 DIAGNOSIS — R73.03 PREDIABETES: ICD-10-CM

## 2018-05-17 DIAGNOSIS — E78.2 MIXED HYPERLIPIDEMIA: Primary | ICD-10-CM

## 2018-05-17 PROBLEM — E78.5 HYPERLIPIDEMIA: Status: ACTIVE | Noted: 2018-05-17

## 2018-05-18 ENCOUNTER — OFFICE VISIT (OUTPATIENT)
Dept: UROLOGY | Facility: AMBULATORY SURGERY CENTER | Age: 58
End: 2018-05-18
Payer: COMMERCIAL

## 2018-05-18 VITALS
SYSTOLIC BLOOD PRESSURE: 118 MMHG | HEIGHT: 70 IN | BODY MASS INDEX: 22.67 KG/M2 | HEART RATE: 70 BPM | DIASTOLIC BLOOD PRESSURE: 68 MMHG | WEIGHT: 158.38 LBS

## 2018-05-18 DIAGNOSIS — N20.0 KIDNEY STONES: Primary | ICD-10-CM

## 2018-05-18 PROCEDURE — 99213 OFFICE O/P EST LOW 20 MIN: CPT | Performed by: NURSE PRACTITIONER

## 2018-05-18 RX ORDER — SIMVASTATIN 10 MG
10 TABLET ORAL
COMMUNITY
End: 2018-09-05 | Stop reason: SDUPTHER

## 2018-05-18 RX ORDER — SIMVASTATIN 20 MG
TABLET ORAL
COMMUNITY
End: 2018-09-05 | Stop reason: SDUPTHER

## 2018-05-18 NOTE — PROGRESS NOTES
5/18/2018    Neil Kanade  1960  [id number]        Assessment  Nephrolithiasis s/p left ureteroscopy (2/1/2018)  Nocturia    Discussion  Polo Herbert is a 59 y o  male being managed by Dr Whaley  Patient is currently doing well  We discussed treatment options for his urinary symptoms but he defers medical management at this time  He was instructed on fluid restriction prior to bed  His KUB unfortunately has not been read by Radiology yet is personally reviewed and there are faint visualization of bilateral nonobstructing stones  Once the formal report is available patient will be notified of results  We discussed possible future treatment with ESWL his stones  This procedure was reviewed in detail with the patient  He should return in 6 months with renal ultrasound to ensure stability of his stone burden  He also has yet to have his PSA checked  PSA as of February 2017 was 2 5  History of Present Illness  59 y o  male with a history of nephrolithiasis s/p left ureteroscopy (2/1/2018) presents today for  follow up  Last seen February 9, 2018  At this time he had 2nd left ureteral stone  He spontaneously passed this on his own  He denies any flank pain or stone episodes since  He does continue to have nocturia 2 times a night  He denies any dysuria or gross hematuria  He denies any other lower urinary tract symptoms  He denies any other changes in his overall health  Review of Systems  Review of Systems   Constitutional: Negative  HENT: Negative  Respiratory: Negative  Cardiovascular: Negative  Gastrointestinal: Negative  Genitourinary:        As per HPI   Musculoskeletal: Negative  Skin: Negative  Neurological: Negative  Hematological: Negative  AUA SYMPTOM SCORE      Most Recent Value   AUA SYMPTOM SCORE   How often have you had a sensation of not emptying your bladder completely after you finished urinating?   0   How often have you had to urinate again less than two hours after you finished urinating? 3   How often have you found you stopped and started again several times when you urinate?  0   How often have you found it difficult to postpone urination? 0   How often have you had a weak urinary stream?  2   How often have you had to push or strain to begin urination? 0   How many times did you most typically get up to urinate from the time you went to bed at night until the time you got up in the morning? 2   Quality of Life: If you were to spend the rest of your life with your urinary condition just the way it is now, how would you feel about that?  3   AUA SYMPTOM SCORE  7        Urinary Incontinence Screening      Most Recent Value   Urinary Incontinence   Urinary Incontinence? No   Incomplete emptying? Yes   Urinary frequency? No   Urinary urgency? No   Urinary hesitancy? No   Dysuria (painful difficult urination)? No   Nocturia (waking up to use the bathroom)? No   Straining (having to push to go)? No   Weak stream?  No   Intermittent stream?  No   Post void dribbling? No            Past Medical History  Past Medical History:   Diagnosis Date    Anxiety     Erectile dysfunction of non-organic origin     Hyperlipidemia     Kidney stone        Past Surgical History  Past Surgical History:   Procedure Laterality Date    FOOT SURGERY      HAND SURGERY Left     HERNIA REPAIR      [place] COLONOSCOPY FLX DX W/COLLJ SPEC WHEN PFRMD N/A 1/10/2017    Procedure: COLONOSCOPY;  Surgeon: Tracee Dewey MD;  Location: [facility];   Service: Gastroenterology    [place] CYSTO/URETERO W/LITHOTRIPSY &INDWELL STENT INSRT Left 2/1/2018    Procedure: CYSTOSCOPY, LEFT URETEROSCOPY, RETROGRADE PYELOGRAM;  Surgeon: Nguyễn Coulter MD;  Location: [facility];  Service: Urology        Past Family History  Family History   Problem Relation Age of Onset    Lung cancer Mother     Lung cancer Father        Past Social history  Social History     Social History    Marital status:      Spouse name: N/A    Number of children: N/A    Years of education: N/A     Occupational History    Not on file  Social History Main Topics    Smoking status: Never Smoker    Smokeless tobacco: Never Used    Alcohol use Yes      Comment: occasional    Drug use: No    Sexual activity: Not on file     Other Topics Concern    Not on file     Social History Narrative    No narrative on file       Current Medications  Current Outpatient Prescriptions   Medication Sig Dispense Refill    Cetirizine HCl (ZYRTEC ALLERGY) 10 MG CAPS Take by mouth daily as needed      DAILY MULTIPLE VITAMINS/IRON PO Take 1 tablet by mouth daily      simvastatin (ZOCOR) 10 mg tablet Take 10 mg by mouth daily at bedtime      acetaminophen (TYLENOL) 500 mg tablet Take 2 tablets (1,000 mg total) by mouth every 6 (six) hours as needed for mild pain for up to 30 doses 30 tablet 0    naproxen (NAPROSYN) 500 mg tablet Take 1 tablet (500 mg total) by mouth 2 (two) times a day with meals 30 tablet 0    oxyCODONE (ROXICODONE) 5 mg immediate release tablet Take 1 tablet (5 mg total) by mouth every 4 (four) hours as needed for severe pain for up to 10 doses Max Daily Amount: 30 mg 10 tablet 0    simvastatin (ZOCOR) 20 mg tablet [id number] tablet      tamsulosin (FLOMAX) 0 4 mg TAKE 1 CAPSULE BEDTIME 30 capsule 0     No current facility-administered medications for this visit  Allergies  Allergies   Allergen Reactions    Other      trees    Pollen Extract        Past Medical History, Social History, Family History, medications and allergies were reviewed and updated as appropriate  Vitals  Vitals:    05/18/18 1433   BP: 118/68   BP Location: Left arm   Patient Position: Sitting   Cuff Size: Adult   Pulse: 70   Weight: 71 8 kg (158 lb 6 oz)   Height: 5' 10" (1 778 m)       Physical Exam  Skin: warm, dry, intact  Pulmonary: Non-labored breathing  Abdomen: Soft, non-tender, non-distended  Musculoskeletal: AROM with no joint deformity or tenderness  Neurology: Alert and oriented  Genitourinary: Negative CVA tenderness, negative suprapubic tenderness              Results    Lab Results   Component Value Date    GLUCOSE 94 02/08/2018    CALCIUM 8 8 02/08/2018     02/08/2018    K 4 1 02/08/2018    CO2 26 02/08/2018     02/08/2018    BUN 26 (H) 02/08/2018    CREATININE 1 33 (H) 02/08/2018     Lab Results   Component Value Date    WBC 5 6 02/14/2017    HGB 13 6 02/14/2017    HCT 41 0 02/14/2017    MCV 88 6 02/14/2017     02/14/2017

## 2018-05-21 ENCOUNTER — TELEPHONE (OUTPATIENT)
Dept: UROLOGY | Facility: HOSPITAL | Age: 58
End: 2018-05-21

## 2018-05-21 DIAGNOSIS — N20.0 RENAL CALCULUS: Primary | ICD-10-CM

## 2018-05-21 NOTE — TELEPHONE ENCOUNTER
Please let patient know KUB was reviewed and his stones are not visualized on the KUB  This does not mean that they are no longer there  Unfortunately he would not be a candidate for ESWL  He should return in 6 months for an OV with renal ultrasound prior  Orders for Ultrasound in [facility]

## 2018-05-21 NOTE — TELEPHONE ENCOUNTER
Patient was informed of KUB results and that no ESWL was needed per Jefferson Trejo, and to have a repeat ultrasound performed prior to his 6 month appt  Patient will call back to schedule and OV for November

## 2018-06-21 LAB
ALBUMIN SERPL-MCNC: 4 G/DL (ref 3.6–5.1)
ALBUMIN/GLOB SERPL: 1.3 (CALC) (ref 1–2.5)
ALP SERPL-CCNC: 40 U/L (ref 40–115)
ALT SERPL-CCNC: 17 U/L (ref 9–46)
AST SERPL-CCNC: 18 U/L (ref 10–35)
BILIRUB SERPL-MCNC: 0.7 MG/DL (ref 0.2–1.2)
BUN SERPL-MCNC: 20 MG/DL (ref 7–25)
BUN/CREAT SERPL: ABNORMAL (CALC) (ref 6–22)
CALCIUM ALBUM COR SERPL-MCNC: 9.5 MG/DL (CALC) (ref 8.6–10.2)
CALCIUM SERPL-MCNC: 9.2 MG/DL (ref 8.6–10.3)
CHLORIDE SERPL-SCNC: 108 MMOL/L (ref 98–110)
CHOLEST SERPL-MCNC: 166 MG/DL
CHOLEST/HDLC SERPL: 4.6 (CALC)
CO2 SERPL-SCNC: 27 MMOL/L (ref 20–31)
CREAT SERPL-MCNC: 0.96 MG/DL (ref 0.7–1.25)
EST. AVERAGE GLUCOSE BLD GHB EST-MCNC: 108 (CALC)
EST. AVERAGE GLUCOSE BLD GHB EST-SCNC: 6 (CALC)
GLOBULIN SER CALC-MCNC: 3 G/DL (CALC) (ref 1.9–3.7)
GLUCOSE SERPL-MCNC: 103 MG/DL (ref 65–99)
HBA1C MFR BLD: 5.4 % OF TOTAL HGB
HDLC SERPL-MCNC: 36 MG/DL
LDLC SERPL CALC-MCNC: 107 MG/DL (CALC)
NONHDLC SERPL-MCNC: 130 MG/DL (CALC)
POTASSIUM SERPL-SCNC: 3.9 MMOL/L (ref 3.5–5.3)
PROT SERPL-MCNC: 7 G/DL (ref 6.1–8.1)
PSA SERPL-MCNC: 3.1 NG/ML
SL AMB EGFR AFRICAN AMERICAN: 99 ML/MIN/1.73M2
SL AMB EGFR NON AFRICAN AMERICAN: 86 ML/MIN/1.73M2
SODIUM SERPL-SCNC: 141 MMOL/L (ref 135–146)
TRIGL SERPL-MCNC: 120 MG/DL
TSH SERPL-ACNC: 1.52 MIU/L (ref 0.4–4.5)

## 2018-09-05 ENCOUNTER — OFFICE VISIT (OUTPATIENT)
Dept: FAMILY MEDICINE CLINIC | Facility: CLINIC | Age: 58
End: 2018-09-05
Payer: COMMERCIAL

## 2018-09-05 VITALS
DIASTOLIC BLOOD PRESSURE: 80 MMHG | WEIGHT: 157.8 LBS | HEIGHT: 70 IN | BODY MASS INDEX: 22.59 KG/M2 | SYSTOLIC BLOOD PRESSURE: 112 MMHG | TEMPERATURE: 96.3 F | OXYGEN SATURATION: 99 % | HEART RATE: 68 BPM | RESPIRATION RATE: 14 BRPM

## 2018-09-05 DIAGNOSIS — R73.03 PREDIABETES: ICD-10-CM

## 2018-09-05 DIAGNOSIS — Z23 NEED FOR VACCINATION: ICD-10-CM

## 2018-09-05 DIAGNOSIS — E78.2 MIXED HYPERLIPIDEMIA: Primary | ICD-10-CM

## 2018-09-05 DIAGNOSIS — J30.1 ALLERGIC RHINITIS DUE TO POLLEN, UNSPECIFIED SEASONALITY: ICD-10-CM

## 2018-09-05 DIAGNOSIS — N20.0 KIDNEY STONES: ICD-10-CM

## 2018-09-05 DIAGNOSIS — G62.9 PERIPHERAL POLYNEUROPATHY: ICD-10-CM

## 2018-09-05 PROCEDURE — 99214 OFFICE O/P EST MOD 30 MIN: CPT | Performed by: FAMILY MEDICINE

## 2018-09-05 PROCEDURE — 1036F TOBACCO NON-USER: CPT | Performed by: FAMILY MEDICINE

## 2018-09-05 PROCEDURE — 90682 RIV4 VACC RECOMBINANT DNA IM: CPT

## 2018-09-05 RX ORDER — SIMVASTATIN 20 MG
TABLET ORAL
Qty: 90 TABLET | Refills: 1 | Status: SHIPPED | OUTPATIENT
Start: 2018-09-05 | End: 2019-07-17 | Stop reason: SDUPTHER

## 2018-09-05 NOTE — ASSESSMENT & PLAN NOTE
Chol 166/107  Doing reasonably well on simvastatin 20 mg qd  Will maintain current dosage for now  Continue diet/exercise

## 2018-09-05 NOTE — ASSESSMENT & PLAN NOTE
Hx of recent stones, including 1 that required snaring/stenting  Currently doing well  No longer sees urology

## 2018-09-25 DIAGNOSIS — E78.2 MIXED HYPERLIPIDEMIA: ICD-10-CM

## 2018-09-25 RX ORDER — SIMVASTATIN 20 MG
TABLET ORAL
Qty: 30 TABLET | Refills: 4 | Status: SHIPPED | OUTPATIENT
Start: 2018-09-25 | End: 2019-01-16

## 2018-12-20 DIAGNOSIS — N52.9 ERECTILE DYSFUNCTION, UNSPECIFIED ERECTILE DYSFUNCTION TYPE: Primary | ICD-10-CM

## 2018-12-20 RX ORDER — SILDENAFIL 100 MG/1
100 TABLET, FILM COATED ORAL DAILY PRN
Qty: 10 TABLET | Refills: 5 | Status: SHIPPED | OUTPATIENT
Start: 2018-12-20 | End: 2019-09-05 | Stop reason: ALTCHOICE

## 2019-01-11 LAB
ALBUMIN SERPL-MCNC: 4.1 G/DL (ref 3.6–5.1)
ALBUMIN/GLOB SERPL: 1.5 (CALC) (ref 1–2.5)
ALP SERPL-CCNC: 34 U/L (ref 40–115)
ALT SERPL-CCNC: 18 U/L (ref 9–46)
AST SERPL-CCNC: 17 U/L (ref 10–35)
BILIRUB SERPL-MCNC: 0.6 MG/DL (ref 0.2–1.2)
BUN SERPL-MCNC: 19 MG/DL (ref 7–25)
BUN/CREAT SERPL: ABNORMAL (CALC) (ref 6–22)
CALCIUM SERPL-MCNC: 9 MG/DL (ref 8.6–10.3)
CHLORIDE SERPL-SCNC: 107 MMOL/L (ref 98–110)
CHOLEST SERPL-MCNC: 157 MG/DL
CHOLEST/HDLC SERPL: 4.4 (CALC)
CO2 SERPL-SCNC: 28 MMOL/L (ref 20–32)
CREAT SERPL-MCNC: 0.86 MG/DL (ref 0.7–1.25)
GLOBULIN SER CALC-MCNC: 2.8 G/DL (CALC) (ref 1.9–3.7)
GLUCOSE SERPL-MCNC: 98 MG/DL (ref 65–99)
HBA1C MFR BLD: 5.4 % OF TOTAL HGB
HDLC SERPL-MCNC: 36 MG/DL
LDLC SERPL CALC-MCNC: 100 MG/DL (CALC)
NONHDLC SERPL-MCNC: 121 MG/DL (CALC)
POTASSIUM SERPL-SCNC: 4.4 MMOL/L (ref 3.5–5.3)
PROT SERPL-MCNC: 6.9 G/DL (ref 6.1–8.1)
SL AMB EGFR AFRICAN AMERICAN: 109 ML/MIN/1.73M2
SL AMB EGFR NON AFRICAN AMERICAN: 94 ML/MIN/1.73M2
SODIUM SERPL-SCNC: 140 MMOL/L (ref 135–146)
TRIGL SERPL-MCNC: 110 MG/DL

## 2019-01-16 ENCOUNTER — OFFICE VISIT (OUTPATIENT)
Dept: FAMILY MEDICINE CLINIC | Facility: CLINIC | Age: 59
End: 2019-01-16
Payer: COMMERCIAL

## 2019-01-16 VITALS
TEMPERATURE: 97 F | WEIGHT: 163 LBS | DIASTOLIC BLOOD PRESSURE: 76 MMHG | BODY MASS INDEX: 23.34 KG/M2 | HEART RATE: 78 BPM | OXYGEN SATURATION: 96 % | HEIGHT: 70 IN | RESPIRATION RATE: 16 BRPM | SYSTOLIC BLOOD PRESSURE: 122 MMHG

## 2019-01-16 DIAGNOSIS — N20.0 KIDNEY STONES: ICD-10-CM

## 2019-01-16 DIAGNOSIS — N52.9 ERECTILE DYSFUNCTION, UNSPECIFIED ERECTILE DYSFUNCTION TYPE: ICD-10-CM

## 2019-01-16 DIAGNOSIS — E78.2 MIXED HYPERLIPIDEMIA: Primary | ICD-10-CM

## 2019-01-16 DIAGNOSIS — R73.03 PREDIABETES: ICD-10-CM

## 2019-01-16 DIAGNOSIS — G62.9 PERIPHERAL POLYNEUROPATHY: ICD-10-CM

## 2019-01-16 DIAGNOSIS — J30.1 ALLERGIC RHINITIS DUE TO POLLEN, UNSPECIFIED SEASONALITY: ICD-10-CM

## 2019-01-16 DIAGNOSIS — Z12.5 SCREENING FOR PROSTATE CANCER: ICD-10-CM

## 2019-01-16 PROCEDURE — 99214 OFFICE O/P EST MOD 30 MIN: CPT | Performed by: FAMILY MEDICINE

## 2019-01-16 PROCEDURE — 3008F BODY MASS INDEX DOCD: CPT | Performed by: FAMILY MEDICINE

## 2019-01-16 PROCEDURE — 1036F TOBACCO NON-USER: CPT | Performed by: FAMILY MEDICINE

## 2019-01-16 NOTE — ASSESSMENT & PLAN NOTE
Cholesterol 157/100  Doing pretty well since reducing simvastatin to 20 mg daily  Will maintain at this dosage right now    Continue diet and exercise

## 2019-01-16 NOTE — PROGRESS NOTES
[facility]      NAME: Danny Araiza  AGE: 59 y o  SEX: male  : 1960   MRN: [id number]    DATE: 2019  TIME: 9:48 AM    Assessment and Plan     Problem List Items Addressed This Visit     Erectile dysfunction     Patient taking sildenafil 100 mg p r n  Without side effects         Kidney stones     History of kidney stones, 1 requiring snare and stenting  No recent problems         Hyperlipidemia - Primary     Cholesterol 157/100  Doing pretty well since reducing simvastatin to 20 mg daily  Will maintain at this dosage right now  Continue diet and exercise         Relevant Orders    Comprehensive metabolic panel    Lipid Panel with Direct LDL reflex    Prediabetes     Blood sugar 98, A1c 5 4%  Discussed reduced carb diet and continue exercise  Will continue to monitor         Relevant Orders    CBC and differential    Comprehensive metabolic panel    Hemoglobin A1C    TSH, 3rd generation with Free T4 reflex    Allergic rhinitis due to pollen     Cinthia Phillip has occasional bouts of allergic rhinitis  Questionable triggers  These usually respond to p r n  Use of antihistamines  If symptoms worsen, recommend allergy testing         RESOLVED: Peripheral polyneuropathy      Other Visit Diagnoses     Screening for prostate cancer        Relevant Orders    PSA, Total Screen              Return to office in:  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    Current with colonoscopy and flu shot  Six months, fasting blood work prior at [facility]   Patient presents with    Follow-up     6 months check up/BW 1/10       History of Present Illness     Patient presents for recheck of chronic medical problems today  Overall is feeling well     Doing well on simvastatin for cholesterol  Still has some problems was seasonal allergies  No recent kidney stones    Patient had labs drawn on         The following portions of the patient's history were reviewed and updated as appropriate: allergies, current medications, past family history, past medical history, past social history, past surgical history and problem list     Review of Systems   Review of Systems   Respiratory: Negative  Cardiovascular: Negative  Gastrointestinal: Negative  Genitourinary: Negative  Active Problem List     Patient Active Problem List   Diagnosis    BPH with obstruction/lower urinary tract symptoms    Erectile dysfunction    Kidney stones    Anxiety    Hyperlipidemia    Prediabetes    Allergic rhinitis due to pollen       Objective   /76   Pulse 78   Temp (!) 97 °F (36 1 °C) (Tympanic)   Resp 16   Ht 5' 9 69" (1 77 m)   Wt 73 9 kg (163 lb)   SpO2 96%   BMI 23 60 kg/m²     Physical Exam   Cardiovascular: Normal rate, regular rhythm, normal heart sounds and intact distal pulses  Carotids: no bruits  Ext: no edema   Pulmonary/Chest: Effort normal  No respiratory distress  He has no wheezes  He has no rales  Psychiatric: He has a normal mood and affect   His behavior is normal  Thought content normal        Pertinent Laboratory/Diagnostic Studies:  Chemistry Profile:   Lab Results   Component Value Date/Time     02/14/2017 12:00 AM    K 4 4 01/10/2019 10:02 AM     01/10/2019 10:02 AM    CO2 28 01/10/2019 10:02 AM    ANIONGAP 5 08/14/2015 09:52 AM    BUN 19 01/10/2019 10:02 AM    CREATININE 1 33 (H) 02/08/2018 12:52 AM    CREATININE 0 93 02/14/2017 12:00 AM    GLUC 98 01/10/2019 10:02 AM    GLUF 106 (H) 08/21/2017 03:17 PM    GLUCOSE 113 08/14/2015 09:52 AM    CALCIUM 9 0 01/10/2019 10:02 AM    CORRECTEDCA 9 5 06/20/2018 09:03 AM    AST 20 08/21/2017 03:17 PM    AST 19 02/14/2017 12:00 AM    ALT 25 08/21/2017 03:17 PM    ALT 22 02/14/2017 12:00 AM    ALKPHOS 34 (L) 01/10/2019 10:02 AM    PROT 7 1 02/14/2017 12:00 AM    BILITOT 0 6 02/14/2017 12:00 AM    EGFR 58 02/08/2018 12:52 AM     Other labs    Current Medications     Current Outpatient Prescriptions:     Cetirizine HCl (ZYRTEC ALLERGY) 10 MG CAPS, Take by mouth daily as needed, Disp: , Rfl:     DAILY MULTIPLE VITAMINS/IRON PO, Take 1 tablet by mouth daily, Disp: , Rfl:     sildenafil (VIAGRA) 100 mg tablet, Take 1 tablet (100 mg total) by mouth daily as needed for erectile dysfunction, Disp: 10 tablet, Rfl: 5    simvastatin (ZOCOR) 20 mg tablet, Take one tablet by mouth daily, Disp: 90 tablet, Rfl: 1    Health Maintenance     Health Maintenance   Topic Date Due    Hepatitis C Screening  1960    DTaP,Tdap,and Td Vaccines (1 - Tdap) 02/14/1979    Depression Screening PHQ  01/16/2020    CRC Screening: Colonoscopy  01/10/2022    INFLUENZA VACCINE  Completed     Immunization History   Administered Date(s) Administered    Hep A / Hep B 03/06/2003, 05/23/2003, 11/10/2003    Influenza Quadrivalent, 6-35 Months IM 12/29/2015, 08/21/2017    Influenza TIV (IM) 10/25/2013    Influenza, recombinant, quadrivalent,injectable, preservative free 09/05/2018       Raf Villa DO  [address] [facility]

## 2019-01-16 NOTE — ASSESSMENT & PLAN NOTE
Blood sugar 98, A1c 5 4%  Discussed reduced carb diet and continue exercise    Will continue to monitor

## 2019-01-16 NOTE — ASSESSMENT & PLAN NOTE
Chantal Tinajero has occasional bouts of allergic rhinitis  Questionable triggers  These usually respond to p r n  Use of antihistamines    If symptoms worsen, recommend allergy testing

## 2019-07-16 PROBLEM — R97.20 ELEVATED PSA: Status: ACTIVE | Noted: 2019-07-16

## 2019-07-16 PROBLEM — Z87.442 HISTORY OF KIDNEY STONES: Status: ACTIVE | Noted: 2018-01-26

## 2019-07-16 LAB
ALBUMIN SERPL-MCNC: 4.2 G/DL (ref 3.6–5.1)
ALBUMIN/GLOB SERPL: 1.3 (CALC) (ref 1–2.5)
ALP SERPL-CCNC: 35 U/L (ref 40–115)
ALT SERPL-CCNC: 19 U/L (ref 9–46)
AST SERPL-CCNC: 19 U/L (ref 10–35)
BASOPHILS # BLD AUTO: 41 CELLS/UL (ref 0–200)
BASOPHILS NFR BLD AUTO: 0.9 %
BILIRUB SERPL-MCNC: 1 MG/DL (ref 0.2–1.2)
BUN SERPL-MCNC: 19 MG/DL (ref 7–25)
BUN/CREAT SERPL: ABNORMAL (CALC) (ref 6–22)
CALCIUM SERPL-MCNC: 9.5 MG/DL (ref 8.6–10.3)
CHLORIDE SERPL-SCNC: 103 MMOL/L (ref 98–110)
CHOLEST SERPL-MCNC: 162 MG/DL
CHOLEST/HDLC SERPL: 4.3 (CALC)
CO2 SERPL-SCNC: 28 MMOL/L (ref 20–32)
CREAT SERPL-MCNC: 0.96 MG/DL (ref 0.7–1.25)
EOSINOPHIL # BLD AUTO: 131 CELLS/UL (ref 15–500)
EOSINOPHIL NFR BLD AUTO: 2.9 %
ERYTHROCYTE [DISTWIDTH] IN BLOOD BY AUTOMATED COUNT: 12.9 % (ref 11–15)
GLOBULIN SER CALC-MCNC: 3.2 G/DL (CALC) (ref 1.9–3.7)
GLUCOSE SERPL-MCNC: 101 MG/DL (ref 65–99)
HBA1C MFR BLD: 5.4 % OF TOTAL HGB
HCT VFR BLD AUTO: 44.9 % (ref 38.5–50)
HDLC SERPL-MCNC: 38 MG/DL
HGB BLD-MCNC: 14.7 G/DL (ref 13.2–17.1)
LDLC SERPL CALC-MCNC: 102 MG/DL (CALC)
LYMPHOCYTES # BLD AUTO: 572 CELLS/UL (ref 850–3900)
LYMPHOCYTES NFR BLD AUTO: 12.7 %
MCH RBC QN AUTO: 29.6 PG (ref 27–33)
MCHC RBC AUTO-ENTMCNC: 32.7 G/DL (ref 32–36)
MCV RBC AUTO: 90.5 FL (ref 80–100)
MONOCYTES # BLD AUTO: 491 CELLS/UL (ref 200–950)
MONOCYTES NFR BLD AUTO: 10.9 %
NEUTROPHILS # BLD AUTO: 3267 CELLS/UL (ref 1500–7800)
NEUTROPHILS NFR BLD AUTO: 72.6 %
NONHDLC SERPL-MCNC: 124 MG/DL (CALC)
PLATELET # BLD AUTO: 208 THOUSAND/UL (ref 140–400)
PMV BLD REES-ECKER: 11.7 FL (ref 7.5–12.5)
POTASSIUM SERPL-SCNC: 4.2 MMOL/L (ref 3.5–5.3)
PROT SERPL-MCNC: 7.4 G/DL (ref 6.1–8.1)
PSA SERPL-MCNC: 4.8 NG/ML
RBC # BLD AUTO: 4.96 MILLION/UL (ref 4.2–5.8)
SL AMB EGFR AFRICAN AMERICAN: 98 ML/MIN/1.73M2
SL AMB EGFR NON AFRICAN AMERICAN: 85 ML/MIN/1.73M2
SODIUM SERPL-SCNC: 138 MMOL/L (ref 135–146)
TRIGL SERPL-MCNC: 121 MG/DL
TSH SERPL-ACNC: 1.44 MIU/L (ref 0.4–4.5)
WBC # BLD AUTO: 4.5 THOUSAND/UL (ref 3.8–10.8)

## 2019-07-17 ENCOUNTER — OFFICE VISIT (OUTPATIENT)
Dept: FAMILY MEDICINE CLINIC | Facility: CLINIC | Age: 59
End: 2019-07-17
Payer: COMMERCIAL

## 2019-07-17 VITALS
TEMPERATURE: 95.7 F | HEART RATE: 70 BPM | BODY MASS INDEX: 22.42 KG/M2 | SYSTOLIC BLOOD PRESSURE: 116 MMHG | WEIGHT: 156.6 LBS | OXYGEN SATURATION: 98 % | DIASTOLIC BLOOD PRESSURE: 72 MMHG | HEIGHT: 70 IN

## 2019-07-17 DIAGNOSIS — E78.2 MIXED HYPERLIPIDEMIA: ICD-10-CM

## 2019-07-17 DIAGNOSIS — Z87.442 HISTORY OF KIDNEY STONES: ICD-10-CM

## 2019-07-17 DIAGNOSIS — R73.03 PREDIABETES: Primary | ICD-10-CM

## 2019-07-17 DIAGNOSIS — J30.1 ALLERGIC RHINITIS DUE TO POLLEN, UNSPECIFIED SEASONALITY: ICD-10-CM

## 2019-07-17 DIAGNOSIS — R97.20 ELEVATED PSA: ICD-10-CM

## 2019-07-17 PROCEDURE — 1036F TOBACCO NON-USER: CPT | Performed by: FAMILY MEDICINE

## 2019-07-17 PROCEDURE — 3008F BODY MASS INDEX DOCD: CPT | Performed by: FAMILY MEDICINE

## 2019-07-17 PROCEDURE — 99214 OFFICE O/P EST MOD 30 MIN: CPT | Performed by: FAMILY MEDICINE

## 2019-07-17 RX ORDER — SIMVASTATIN 20 MG
TABLET ORAL
Qty: 90 TABLET | Refills: 1 | Status: SHIPPED | OUTPATIENT
Start: 2019-07-17 | End: 2020-01-16

## 2019-07-17 RX ORDER — CIPROFLOXACIN 500 MG/1
500 TABLET, FILM COATED ORAL EVERY 12 HOURS SCHEDULED
Qty: 28 TABLET | Refills: 0 | Status: SHIPPED | OUTPATIENT
Start: 2019-07-17 | End: 2019-07-31

## 2019-07-17 RX ORDER — ASCORBIC ACID 500 MG
1000 TABLET ORAL DAILY
COMMUNITY

## 2019-07-17 NOTE — ASSESSMENT & PLAN NOTE
PSA from July 15th was 4 8 (previous PSA from 6/20/2018 was 3 1)  Patient does admit to nocturia x2 nightly on average  Otherwise no new symptoms  Will give Rx for Cipro 500 b i d  Times 14 days  Will repeat PSA in 1 month  Will call with results  If still elevated, will refer to Urology    If returned to baseline, will recheck in 6 months

## 2019-07-17 NOTE — PROGRESS NOTES
[facility]      NAME: Damian Pearson  AGE: 62 y o  SEX: male  : 1960   MRN: [id number]    DATE: 2019  TIME: 8:45 AM    Assessment and Plan     Problem List Items Addressed This Visit     History of kidney stones    Hyperlipidemia     Cholesterol 162/102  Doing well on simvastatin 20 mg daily  Continue diet and exercise         Relevant Medications    simvastatin (ZOCOR) 20 mg tablet    Other Relevant Orders    Comprehensive metabolic panel    Lipid Panel with Direct LDL reflex    Prediabetes - Primary     Blood sugar 101, A1c 5 4%  Continue reduced carb diet and exercise  Will continue to follow         Relevant Orders    Comprehensive metabolic panel    Hemoglobin A1C    Allergic rhinitis due to pollen     Continue set resign p r n  Allergies         Elevated PSA     PSA from  was 4 8 (previous PSA from 2018 was 3 1)  Patient does admit to nocturia x2 nightly on average  Otherwise no new symptoms  Will give Rx for Cipro 500 b i d  Times 14 days  Will repeat PSA in 1 month  Will call with results  If still elevated, will refer to Urology  If returned to baseline, will recheck in 6 months         Relevant Medications    ciprofloxacin (CIPRO) 500 mg tablet    Other Relevant Orders    PSA, Total Screen    PSA, Total Screen              Return to office in:  Will repeat PSA in 1 month  Will call with results    Otherwise, 6 months, fasting blood work prior at [address] [place] [address]   Patient presents with    Follow-up     review bloodwork       History of Present Illness     Patient presents for recheck chronic medical problems today  Overall he is feeling well  He still works full-time in maintains an active lifestyle, including refereeing basketball games  Doing well on simvastatin for cholesterol  No longer on medication for neuropathy    Patient had labs drawn on       The following portions of the patient's history were reviewed and updated as appropriate: allergies, current medications, past family history, past medical history, past social history, past surgical history and problem list     Review of Systems   Review of Systems   Respiratory: Negative  Cardiovascular: Negative  Gastrointestinal: Negative  Genitourinary: Negative  Active Problem List     Patient Active Problem List   Diagnosis    BPH with obstruction/lower urinary tract symptoms    Erectile dysfunction    History of kidney stones    Anxiety    Hyperlipidemia    Prediabetes    Allergic rhinitis due to pollen    Elevated PSA       Objective   /72   Pulse 70   Temp (!) 95 7 °F (35 4 °C) (Tympanic)   Ht 5' 10" (1 778 m)   Wt 71 kg (156 lb 9 6 oz)   SpO2 98%   BMI 22 47 kg/m²     Physical Exam   Cardiovascular: Normal rate, regular rhythm, normal heart sounds and intact distal pulses  Carotids: no bruits  Ext: no edema   Pulmonary/Chest: Effort normal  No respiratory distress  He has no wheezes  He has no rales  Psychiatric: He has a normal mood and affect   His behavior is normal  Thought content normal        Pertinent Laboratory/Diagnostic Studies:  Lab results from July 15th were reviewed    Current Medications     Current Outpatient Medications:     ascorbic acid (VITAMIN C) 500 mg tablet, Take 500 mg by mouth daily, Disp: , Rfl:     Cetirizine HCl (ZYRTEC ALLERGY) 10 MG CAPS, Take by mouth daily as needed, Disp: , Rfl:     DAILY MULTIPLE VITAMINS/IRON PO, Take 1 tablet by mouth daily, Disp: , Rfl:     sildenafil (VIAGRA) 100 mg tablet, Take 1 tablet (100 mg total) by mouth daily as needed for erectile dysfunction, Disp: 10 tablet, Rfl: 5    simvastatin (ZOCOR) 20 mg tablet, Take one tablet by mouth daily, Disp: 90 tablet, Rfl: 1    ciprofloxacin (CIPRO) 500 mg tablet, Take 1 tablet (500 mg total) by mouth every 12 (twelve) hours for 14 days, Disp: 28 tablet, Rfl: 0    [facility] Maintenance   Topic Date Due    Hepatitis C Screening  1960    DTaP,Tdap,and Td Vaccines (1 - Tdap) 02/14/1979    INFLUENZA VACCINE  09/17/2019 (Originally 7/1/2019)    BMI: Adult  01/16/2020    CRC Screening: Colonoscopy  01/10/2022    Pneumococcal Vaccine: 65+ Years (1 of 2 - PCV13) 02/14/2023    Pneumococcal Vaccine: Pediatrics (0 to 5 Years) and At-Risk Patients (6 to 59 Years)  Aged Out    HEPATITIS B VACCINES  Aged [employer] History   Administered Date(s) Administered    Hep A / Hep B 03/06/2003, 05/23/2003, 11/10/2003    Influenza Quadrivalent, 6-35 Months IM 12/29/2015, 08/21/2017    Influenza TIV (IM) 10/25/2013    Influenza, recombinant, quadrivalent,injectable, preservative free 09/05/2018       DO [facility]

## 2019-08-16 LAB — PSA SERPL-MCNC: 4.5 NG/ML

## 2019-08-28 ENCOUNTER — TELEPHONE (OUTPATIENT)
Dept: UROLOGY | Facility: MEDICAL CENTER | Age: 59
End: 2019-08-28

## 2019-08-28 NOTE — TELEPHONE ENCOUNTER
Reviewed PCP note and referral, elevated PSA, nocturia/frequency, treated with 14 days cipro for suspected prostatitis probably, repeat PSA still elevated and symptoms persist  I don't actually see any urine testing  If he has results of those he should bring with to his appointment  Appropriate for f/u 9/4 as scheduled which is in 1 week  Hydrate well, warm tub soaks, motrin for pelvic/back pain  If he stops urinating altogether arrange same day appointment

## 2019-08-28 NOTE — TELEPHONE ENCOUNTER
Called patient back in regards to urinary symptoms  Explained to patient that it is appropriate for f/u on 9/4 as scheduled which is in 1 week  Explained to patient that he is to hydrate well, warm tub soaks, heating pad, motrin for pelvic/back pain  Explained to patient that if he stops urinating altogether we can arrange same day appointment  Patient stated that he [facility] and will bring the results with him  Patient understood all direction and will call the office with concerns

## 2019-08-28 NOTE — TELEPHONE ENCOUNTER
Patient of Dr Alka Dubon seen in [place] [address]  Scheduled for 9/4 with La Fry  Patient stating he does not believe he can wait that long to be seen      He can be reached at [phone]

## 2019-08-28 NOTE — TELEPHONE ENCOUNTER
Patient managed by Dr Aris Clarke in [place] last seen on 05/18/18 by [place] for Nephrolithiasis s/p left ureteroscopy (2/1/2018) and Nocturia  Patient is scheduled with Cas Hoover on 09/04/2019 for elevated PSA  Patient called in regards to a sooner appointment  Patient states that he went for a PSA blood draw on 07/15/2019 and came back at 4 8, patient was given amoxicillin and then retested on 08/15/2019 and it came back at 4 5  Patient states over the last two weeks he has been urinating frequently, every two hours and only small amounts  Patient states that he does experience urinary hesitancy and urinary urgency  Patient states that he does have dysuria before and after urinating for the past couple days  Patient also states that when he ejaculates he has dysuria at the beginning and afterwards  Patient went for urine testing on Monday and it came back negative for an infection  Patient has been taking AZO and has a couple of pills left  Patient denies any hematuria  Patient states that he has been having abdominal pain as well as lower back pain but has always had chronic back pain  Patient rates the pain at a 3/10  Please review and advise if patient is to be seen sooner  Thank you!

## 2019-09-04 ENCOUNTER — HOSPITAL ENCOUNTER (OUTPATIENT)
Dept: RADIOLOGY | Facility: HOSPITAL | Age: 59
Discharge: HOME | End: 2019-09-04
Payer: COMMERCIAL

## 2019-09-04 ENCOUNTER — TELEPHONE (OUTPATIENT)
Dept: UROLOGY | Facility: MEDICAL CENTER | Age: 59
End: 2019-09-04

## 2019-09-04 ENCOUNTER — OFFICE VISIT (OUTPATIENT)
Dept: UROLOGY | Facility: AMBULATORY SURGERY CENTER | Age: 59
End: 2019-09-04
Payer: COMMERCIAL

## 2019-09-04 VITALS
SYSTOLIC BLOOD PRESSURE: 122 MMHG | BODY MASS INDEX: 22.05 KG/M2 | DIASTOLIC BLOOD PRESSURE: 68 MMHG | HEIGHT: 70 IN | HEART RATE: 78 BPM | WEIGHT: 154 LBS

## 2019-09-04 DIAGNOSIS — N20.0 NEPHROLITHIASIS: ICD-10-CM

## 2019-09-04 DIAGNOSIS — N40.1 BPH WITH OBSTRUCTION/LOWER URINARY TRACT SYMPTOMS: Primary | ICD-10-CM

## 2019-09-04 DIAGNOSIS — N13.8 BPH WITH OBSTRUCTION/LOWER URINARY TRACT SYMPTOMS: Primary | ICD-10-CM

## 2019-09-04 DIAGNOSIS — R97.20 ELEVATED PSA: ICD-10-CM

## 2019-09-04 LAB
BACTERIA UR QL AUTO: NORMAL /HPF
BILIRUB UR QL STRIP: NEGATIVE
CLARITY UR: CLEAR
COLOR UR: YELLOW
GLUCOSE UR STRIP-MCNC: NEGATIVE MG/DL
HGB UR QL STRIP.AUTO: NEGATIVE
HYALINE CASTS #/AREA URNS LPF: NORMAL /LPF
KETONES UR STRIP-MCNC: NEGATIVE MG/DL
LEUKOCYTE ESTERASE UR QL STRIP: NEGATIVE
NITRITE UR QL STRIP: NEGATIVE
NON-SQ EPI CELLS URNS QL MICRO: NORMAL /HPF
PH UR STRIP.AUTO: 6 [PH]
POST-VOID RESIDUAL VOLUME, ML POC: 0 ML
PROT UR STRIP-MCNC: NEGATIVE MG/DL
RBC #/AREA URNS AUTO: NORMAL /HPF
SL AMB  POCT GLUCOSE, UA: NORMAL
SL AMB LEUKOCYTE ESTERASE,UA: NORMAL
SL AMB POCT BILIRUBIN,UA: NORMAL
SL AMB POCT BLOOD,UA: NORMAL
SL AMB POCT CLARITY,UA: CLEAR
SL AMB POCT COLOR,UA: YELLOW
SL AMB POCT KETONES,UA: NORMAL
SL AMB POCT NITRITE,UA: NORMAL
SL AMB POCT PH,UA: 5
SL AMB POCT SPECIFIC GRAVITY,UA: 1.01
SL AMB POCT URINE PROTEIN: NORMAL
SL AMB POCT UROBILINOGEN: 0.2
SP GR UR STRIP.AUTO: 1.02 (ref 1–1.03)
UROBILINOGEN UR QL STRIP.AUTO: 0.2 E.U./DL
WBC #/AREA URNS AUTO: NORMAL /HPF

## 2019-09-04 PROCEDURE — 81002 URINALYSIS NONAUTO W/O SCOPE: CPT | Performed by: NURSE PRACTITIONER

## 2019-09-04 PROCEDURE — 99214 OFFICE O/P EST MOD 30 MIN: CPT | Performed by: NURSE PRACTITIONER

## 2019-09-04 PROCEDURE — 74176 CT ABD & PELVIS W/O CONTRAST: CPT

## 2019-09-04 PROCEDURE — 51798 US URINE CAPACITY MEASURE: CPT | Performed by: NURSE PRACTITIONER

## 2019-09-04 PROCEDURE — 87086 URINE CULTURE/COLONY COUNT: CPT | Performed by: NURSE PRACTITIONER

## 2019-09-04 PROCEDURE — 81001 URINALYSIS AUTO W/SCOPE: CPT | Performed by: NURSE PRACTITIONER

## 2019-09-04 NOTE — TELEPHONE ENCOUNTER
Patient of Dr Estephania Warner seen in the office today by Karli Avalos, [address]  Stat CT ordered for kidney stone evaluation  IMPRESSION:     Nonobstructing bilateral renal calculi      Mild fullness of the right renal pelvis without constantin hydronephrosis         Discussed results with Karli CHILDS  No obstructing stone at this time  Encourage hydration and motrin as needed, will await the results of urine testing sent today to ensure no urinary tract infection  Called and reviewed the results and recommendations with patient  He verbalized understanding and will await the results of urine testing for further recommendations

## 2019-09-04 NOTE — PATIENT INSTRUCTIONS
CT stone analysis  Repeat PSA in 1 month  If the PSA remains elevated will proceed with a Prostate biopsy  Increase the amount of water you are drinking per day   Urine testing sent today in the office

## 2019-09-04 NOTE — PROGRESS NOTES
9/4/2019      Chief Complaint   Patient presents with    Benign Prostatic Hypertrophy     w/luts    Elevated PSA     PSA 4 5 8/15/19     Assessment and Plan    62 y o  male managed by Dr Maribel Catalan    1  Nephrolithiasis  · Status post left ureteroscopy on 02/01/2018  · Ultrasound of kidney and bladder not completed by patient prior to office visit  · CT abdomen pelvis stone study ordered- stat  · Behavior in dietary modifications reiterated    2  Elevated PSA  · PSA performed 08/15/2019 is 4 5  · Repeat PSA in 1 month  · If PSA remains elevated, proceed with prostate biopsy  · Discussed process with patient  Patient in agreement with plan  Verbalized understanding  · Return for follow-up 1 month    History of Present Illness  Myriam Lombardo is a 62 y o  male here for follow up evaluation of  elevated PSA  PSA as follows below  Patient with a significant history of nephrolithiasis and is status post left ureteroscopy with laser lithotripsy 02/01/2018  He currently has no complaints of suprapubic abdominal discomfort or flank pain  He denies dysuria, hematuria, urinary frequency and urgency and reports sensation of complete bladder emptying with urination  Component       PSA, Total   Latest Ref Rng & Units       < OR = 4 0 ng/mL   6/20/2018      9:01 AM 3 1   7/15/2019      9:30 AM 4 8 (H)   8/15/2019      8:32 AM 4 5 (H)         Review of Systems   Constitutional: Negative for chills and fever  Respiratory: Negative for cough and shortness of breath  Cardiovascular: Negative for chest pain  Gastrointestinal: Negative for abdominal distention, abdominal pain, blood in stool, nausea and vomiting  Genitourinary: Negative for difficulty urinating, dysuria, enuresis, flank pain, frequency, hematuria and urgency  Skin: Negative for rash  Urinary Incontinence Screening      Most Recent Value   Urinary Incontinence   Urinary Incontinence? No   Incomplete emptying? No   Urinary frequency? Yes   Urinary urgency? Yes   Urinary hesitancy? Yes [sometimes]   Dysuria (painful difficult urination)? Yes [after urination]   Nocturia (waking up to use the bathroom)? Yes [3x]   Straining (having to push to go)? Yes [at times]   Weak stream?  Yes   Intermittent stream?  Yes [at times]          AUA SYMPTOM SCORE      Most Recent Value   AUA SYMPTOM SCORE   How often have you had a sensation of not emptying your bladder completely after you finished urinating? 0   How often have you had to urinate again less than two hours after you finished urinating? 4   How often have you found you stopped and started again several times when you urinate? 3   How often have you found it difficult to postpone urination? 1   How often have you had a weak urinary stream?  5   How often have you had to push or strain to begin urination? 2   How many times did you most typically get up to urinate from the time you went to bed at night until the time you got up in the morning? 3   Quality of Life: If you were to spend the rest of your life with your urinary condition just the way it is now, how would you feel about that?  5   AUA SYMPTOM SCORE  18             Past Medical History  Past Medical History:   Diagnosis Date    Abnormal blood chemistry     Allergic rhinitis     last assessed 08/22/2014    Anxiety     Erectile dysfunction of non-organic origin     Hyperglycemia     Last Assessed: 1/20/2016     Hyperlipidemia     Kidney stone     Paresthesia of foot     last assessed 08/14/2015       Past Social History  Past Surgical History:   Procedure Laterality Date    FOOT SURGERY      HAND SURGERY Left     HERNIA REPAIR      INGUINAL HERNIA REPAIR      [place] COLONOSCOPY FLX DX W/COLLJ SPEC WHEN PFRMD N/A 1/10/2017    Procedure: COLONOSCOPY;  Surgeon: Flor Hardwick MD;  Location: [facility];   Service: Gastroenterology    OK CYSTO/URETERO W/LITHOTRIPSY &INDWELL STENT INSRT Left 2/1/2018    Procedure: CYSTOSCOPY, LEFT URETEROSCOPY, RETROGRADE PYELOGRAM;  Surgeon: Koby Whaley MD;  [facility];  Service: Urology     Social History     Tobacco Use   Smoking Status Never Smoker   Smokeless Tobacco Never Used       Past Family History  Family History   Problem Relation Age of Onset    Lung cancer Mother     Lung cancer Father        Past Social history  Social History     Socioeconomic History    Marital status:      Spouse name: Not on file    Number of children: Not on file    Years of education: Not on file    Highest education level: Not on file   Occupational History    Not on file   Social Needs    Financial resource strain: Not on file    Food insecurity:     Worry: Not on file     Inability: Not on file    Transportation needs:     Medical: Not on file     Non-medical: Not on file   Tobacco Use    Smoking status: Never Smoker    Smokeless tobacco: Never Used   Substance and Sexual Activity    Alcohol use: Yes     Comment: occasional/social alcohol use     Drug use: No    Sexual activity: Not on file   Lifestyle    Physical activity:     Days per week: Not on file     Minutes per session: Not on file    Stress: Not on file   Relationships    Social connections:     Talks on phone: Not on file     Gets together: Not on file     Attends Christian service: Not on file     Active member of club or organization: Not on file     Attends meetings of clubs or organizations: Not on file     Relationship status: Not on file    Intimate partner violence:     Fear of current or ex partner: Not on file     Emotionally abused: Not on file     Physically abused: Not on file     Forced sexual activity: Not on file   Other Topics Concern    Not on file   Social History Narrative    Feels Safe at home     No guns in the home     Always uses seat belt        Current Medications  Current Outpatient Medications   Medication Sig Dispense Refill    ascorbic acid (VITAMIN C) 500 mg tablet Take 500 mg by mouth daily      Cetirizine HCl (ZYRTEC ALLERGY) 10 MG CAPS Take by mouth daily as needed      DAILY MULTIPLE VITAMINS/IRON PO Take 1 tablet by mouth daily      simvastatin (ZOCOR) 20 mg tablet Take one tablet by mouth daily 90 tablet 1    ibuprofen (MOTRIN) 400 mg tablet Take 1 tablet (400 mg total) by mouth every 6 (six) hours as needed for mild pain for up to 5 days 30 tablet 0     No current facility-administered medications for this visit  Allergies  Allergies   Allergen Reactions    Other      trees    Pollen Extract          The following portions of the patient's history were reviewed and updated as appropriate: allergies, current medications, past medical history, past social history, past surgical history and problem list       Vitals  Vitals:    09/04/19 0910   BP: 122/68   BP Location: Left arm   Patient Position: Sitting   Cuff Size: Adult   Pulse: 78   Weight: 69 9 kg (154 lb)   Height: 5' 10" (1 778 m)     Physical Exam  Physical Exam   Constitutional: He is oriented to person, place, and time  He appears well-developed and well-nourished  No distress  HENT:   Head: Atraumatic  Pulmonary/Chest: Effort normal  No respiratory distress  Abdominal: There is no CVA tenderness  Neurological: He is alert and oriented to person, place, and time  Skin: Skin is warm and dry  Psychiatric: He has a normal mood and affect  Vitals reviewed        Results  No results found for this or any previous visit (from the past 1 hour(s)) ]  Lab Results   Component Value Date    PSA 4 5 (H) 08/15/2019    PSA 4 8 (H) 07/15/2019    PSA 3 1 06/20/2018     Lab Results   Component Value Date    GLUCOSE 108 09/14/2019    CALCIUM 9 5 07/15/2019     02/14/2017    K 4 2 07/15/2019    CO2 25 09/14/2019     07/15/2019    BUN 19 07/15/2019    CREATININE 0 96 07/15/2019     Lab Results   Component Value Date    WBC 4 5 07/15/2019    HGB 13 6 09/14/2019    HCT 40 09/14/2019    MCV 90 5 07/15/2019  07/15/2019       Orders  Orders Placed This Encounter   Procedures    Urine culture    CT renal stone study abdomen pelvis wo contrast     Standing Status:   Future     Number of Occurrences:   1     Standing Expiration Date:   9/4/2023     Scheduling Instructions:      Nothing to eat 3 hours prior to your test   Clear liquids are also permitted up until the time of the scan  Clear liquids include water, black coffee or tea, apple juice or clear broth  If possible wear clothing without any metal in the abdomen area  Sweat suit, shorts, sports bra or bra without underwire may eliminate the need to change  Please bring your insurance cards, a form of photo ID and a list of your medications with you  Arrive 15 minutes prior to your appointment time in order to register  On the day of your test, please bring any prior CT or MRI studies of this area with you that were not performed at a [facility]  To schedule this appointment, please contact Central Scheduling at [phone]  Order Specific Question:   What is the patient's sedation requirement?      Answer:   No Sedation    Urinalysis with microscopic    PSA Total, Diagnostic     Standing Status:   Future     Standing Expiration Date:   9/4/2020    POCT Measure PVR    POCT urine dip       AMADEO Stringer

## 2019-09-05 ENCOUNTER — OFFICE VISIT (OUTPATIENT)
Dept: FAMILY MEDICINE CLINIC | Facility: CLINIC | Age: 59
End: 2019-09-05
Payer: COMMERCIAL

## 2019-09-05 VITALS
WEIGHT: 155 LBS | TEMPERATURE: 98 F | BODY MASS INDEX: 22.19 KG/M2 | SYSTOLIC BLOOD PRESSURE: 110 MMHG | HEIGHT: 70 IN | OXYGEN SATURATION: 98 % | HEART RATE: 98 BPM | RESPIRATION RATE: 18 BRPM | DIASTOLIC BLOOD PRESSURE: 70 MMHG

## 2019-09-05 DIAGNOSIS — T14.8XXA BLOOD BLISTER: Primary | ICD-10-CM

## 2019-09-05 LAB — BACTERIA UR CULT: NORMAL

## 2019-09-05 PROCEDURE — 99213 OFFICE O/P EST LOW 20 MIN: CPT | Performed by: PHYSICIAN ASSISTANT

## 2019-09-05 PROCEDURE — 3008F BODY MASS INDEX DOCD: CPT | Performed by: PHYSICIAN ASSISTANT

## 2019-09-05 NOTE — PROGRESS NOTES
Assessment/Plan:    1  Blood blister  Reassured patient  Discussed that this is likely due to trauma  Self-limited  Monitor, call with any changes in symptoms  Return to care if symptoms worsen or fail to improve  No problem-specific Assessment & Plan notes found for this encounter  Patient Active Problem List   Diagnosis    BPH with obstruction/lower urinary tract symptoms    Erectile dysfunction    History of kidney stones    Anxiety    Hyperlipidemia    Prediabetes    Allergic rhinitis due to pollen    Elevated PSA     Subjective:      Patient ID: Harry Kilpatrick is a 62 y o  male  Patient is here complaining of a red spot that appeared on his arm last night  States that was not there all day and he noticed it last night  States it looks like blood underneath the skin  He does not recall any trauma  He does state 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, and may have brushed up against it  States it feels like a brush burn, but denies pain  Denies discharge or weeping  Denies spreading  No other associated symptoms  This has never happened before  The following portions of the patient's history were reviewed and updated as appropriate: allergies, current medications, past family history, past medical history, past social history, past surgical history and problem list     Review of Systems   Constitutional: Negative for chills, fatigue and fever  HENT: Negative for congestion, ear pain, postnasal drip, rhinorrhea, sinus pressure, sinus pain and sore throat  Respiratory: Negative for cough, chest tightness, shortness of breath and wheezing  Cardiovascular: Negative for chest pain, palpitations and leg swelling  Gastrointestinal: Negative for abdominal pain, constipation, diarrhea, nausea and vomiting  Endocrine: Negative for cold intolerance, heat intolerance, polydipsia, polyphagia and polyuria  Musculoskeletal: Negative for arthralgias and myalgias  Skin: Positive for rash  Negative for color change and pallor  Neurological: Negative for dizziness, light-headedness and headaches  Hematological: Negative for adenopathy  Does not bruise/bleed easily  Objective:      /70 (BP Location: Left arm, Patient Position: Sitting, Cuff Size: Adult)   Pulse 98   Temp 98 °F (36 7 °C) (Tympanic)   Resp 18   Ht 5' 9 69" (1 77 m)   Wt 70 3 kg (155 lb)   SpO2 98%   BMI 22 44 kg/m²          Physical Exam   Constitutional: He is oriented to person, place, and time  He appears well-developed and well-nourished  HENT:   Head: Normocephalic and atraumatic  Right Ear: External ear normal    Left Ear: External ear normal    Nose: Nose normal    Mouth/Throat: Oropharynx is clear and moist    Eyes: Pupils are equal, round, and reactive to light  Conjunctivae are normal    Neck: Normal range of motion  Neck supple  Cardiovascular: Normal rate, regular rhythm, S1 normal, S2 normal, normal heart sounds and intact distal pulses  No bruits  No peripheral edema  Pulmonary/Chest: Effort normal and breath sounds normal    Musculoskeletal: Normal range of motion  Lymphadenopathy:     He has no cervical adenopathy  Neurological: He is alert and oriented to person, place, and time  Skin: Skin is warm and dry  Capillary refill takes less than 2 seconds  No rash noted  Approximately 0 5 cm vesicle left forearm the with blood  Very small fluid-filled vesicles surrounding  Mild tenderness to touch  Psychiatric: He has a normal mood and affect  His behavior is normal  Judgment and thought content normal    Nursing note and vitals reviewed        Current Outpatient Medications on File Prior to Visit   Medication Sig Dispense Refill    Cetirizine HCl (ZYRTEC ALLERGY) 10 MG CAPS Take by mouth daily as needed      DAILY MULTIPLE VITAMINS/IRON PO Take 1 tablet by mouth daily      simvastatin (ZOCOR) 20 mg tablet Take one tablet by mouth daily 90 tablet 1    ascorbic acid (VITAMIN C) 500 mg tablet Take 500 mg by mouth daily      [DISCONTINUED] sildenafil (VIAGRA) 100 mg tablet Take 1 tablet (100 mg total) by mouth daily as needed for erectile dysfunction (Patient not taking: Reported on 9/5/2019) 10 tablet 5     No current facility-administered medications on file prior to visit

## 2019-09-14 ENCOUNTER — HOSPITAL ENCOUNTER (EMERGENCY)
Facility: HOSPITAL | Age: 59
Discharge: HOME | End: 2019-09-14
Attending: EMERGENCY MEDICINE | Admitting: EMERGENCY MEDICINE
Payer: OTHER MISCELLANEOUS

## 2019-09-14 ENCOUNTER — APPOINTMENT (EMERGENCY)
Dept: CT IMAGING | Facility: HOSPITAL | Age: 59
End: 2019-09-14
Payer: OTHER MISCELLANEOUS

## 2019-09-14 VITALS
DIASTOLIC BLOOD PRESSURE: 77 MMHG | SYSTOLIC BLOOD PRESSURE: 130 MMHG | TEMPERATURE: 97.3 F | RESPIRATION RATE: 20 BRPM | HEART RATE: 66 BPM | HEIGHT: 70 IN | WEIGHT: 158.29 LBS | BODY MASS INDEX: 22.66 KG/M2 | OXYGEN SATURATION: 97 %

## 2019-09-14 DIAGNOSIS — S22.39XA RIB FRACTURE: Primary | ICD-10-CM

## 2019-09-14 LAB
ANION GAP BLD CALC-SCNC: 16 MMOL/L (ref 4–13)
BUN BLD-MCNC: 21 MG/DL (ref 5–25)
CA-I BLD-SCNC: 1.17 MMOL/L (ref 1.12–1.32)
CHLORIDE BLD-SCNC: 106 MMOL/L (ref 100–108)
CREAT BLD-MCNC: 1.1 MG/DL (ref 0.6–1.3)
GFR SERPL CREATININE-BSD FRML MDRD: 72 ML/MIN/1.73SQ M
GLUCOSE SERPL-MCNC: 108 MG/DL (ref 65–140)
HCT VFR BLD CALC: 40 % (ref 36.5–49.3)
HGB BLDA-MCNC: 13.6 G/DL (ref 12–17)
PCO2 BLD: 25 MMOL/L (ref 21–32)
POTASSIUM BLD-SCNC: 4 MMOL/L (ref 3.5–5.3)
SODIUM BLD-SCNC: 142 MMOL/L (ref 136–145)
SPECIMEN SOURCE: ABNORMAL

## 2019-09-14 PROCEDURE — 99284 EMERGENCY DEPT VISIT MOD MDM: CPT | Performed by: EMERGENCY MEDICINE

## 2019-09-14 PROCEDURE — 80047 BASIC METABLC PNL IONIZED CA: CPT

## 2019-09-14 PROCEDURE — 85014 HEMATOCRIT: CPT

## 2019-09-14 PROCEDURE — 72125 CT NECK SPINE W/O DYE: CPT

## 2019-09-14 PROCEDURE — 71260 CT THORAX DX C+: CPT

## 2019-09-14 PROCEDURE — 99285 EMERGENCY DEPT VISIT HI MDM: CPT

## 2019-09-14 PROCEDURE — 70450 CT HEAD/BRAIN W/O DYE: CPT

## 2019-09-14 RX ORDER — ACETAMINOPHEN 325 MG/1
975 TABLET ORAL ONCE
Status: COMPLETED | OUTPATIENT
Start: 2019-09-14 | End: 2019-09-14

## 2019-09-14 RX ORDER — ACETAMINOPHEN 325 MG/1
650 TABLET ORAL EVERY 6 HOURS PRN
Qty: 30 TABLET | Refills: 0 | Status: SHIPPED | OUTPATIENT
Start: 2019-09-14 | End: 2019-09-19

## 2019-09-14 RX ORDER — OXYCODONE HYDROCHLORIDE 5 MG/1
5 TABLET ORAL EVERY 8 HOURS PRN
Qty: 8 TABLET | Refills: 0 | Status: SHIPPED | OUTPATIENT
Start: 2019-09-14 | End: 2019-09-17

## 2019-09-14 RX ORDER — IBUPROFEN 400 MG/1
400 TABLET ORAL EVERY 6 HOURS PRN
Qty: 30 TABLET | Refills: 0 | Status: SHIPPED | OUTPATIENT
Start: 2019-09-14 | End: 2022-03-09

## 2019-09-14 RX ORDER — IBUPROFEN 400 MG/1
400 TABLET ORAL ONCE
Status: DISCONTINUED | OUTPATIENT
Start: 2019-09-14 | End: 2019-09-14 | Stop reason: HOSPADM

## 2019-09-14 RX ADMIN — IOHEXOL 85 ML: 350 INJECTION, SOLUTION INTRAVENOUS at 01:49

## 2019-09-14 RX ADMIN — DICLOFENAC 2 G: 10 GEL TOPICAL at 02:48

## 2019-09-14 RX ADMIN — ACETAMINOPHEN 975 MG: 325 TABLET, FILM COATED ORAL at 02:34

## 2019-09-14 NOTE — ED PROVIDER NOTES
History  Chief Complaint   Patient presents with    Fall     Presents for eval s/p mechanical trip/fall occurring approx 0030  Patient reports landing face first on pavement, striking chest and head  Denies LOC  Reports SOB since incicent and difficulty to draw deep breath   Breathing Difficulty     HPI    Patient is a pleasant 62year old male who fell 1 hour ago  Hit his chest      + head strike with no loc  Notes pain along the right ribs  Pain is sharp, worse with deep breaths  No vomiting  No headache  Small laceration about the right eyebrow  MDM rib fracture, will treat as such  The patient (and any family present) verbalized understanding of the discharge instructions and warnings that would necessitate return to the Emergency Department  Gave verbal in addition to written discharge instructions  Specifically highlighted areas of special concern regarding the discharge instructions and return precautions  Furthermore, I expressed that the follow up instructions were serious and should not be simply dismissed  Follow up is an integral part of the patients care and should NOT be taken lightly or dismissed  Patient expressed understanding of this and understands that follow up is now their responsibility  All questions were answered prior to discharge  Prior to Admission Medications   Prescriptions Last Dose Informant Patient Reported? Taking?    Cetirizine HCl (ZYRTEC ALLERGY) 10 MG CAPS  Self Yes Yes   Sig: Take by mouth daily as needed   DAILY MULTIPLE VITAMINS/IRON PO  Self Yes Yes   Sig: Take 1 tablet by mouth daily   ascorbic acid (VITAMIN C) 500 mg tablet  Self Yes Yes   Sig: Take 500 mg by mouth daily   simvastatin (ZOCOR) 20 mg tablet  Self No Yes   Sig: Take one tablet by mouth daily      Facility-Administered Medications: None       Past Medical History:   Diagnosis Date    Abnormal blood chemistry     Allergic rhinitis     last assessed 08/22/2014    Anxiety     Erectile dysfunction of non-organic origin     Hyperglycemia     Last Assessed: 1/20/2016     Hyperlipidemia     Kidney stone     Paresthesia of foot     last assessed 08/14/2015       Past Surgical History:   Procedure Laterality Date    FOOT SURGERY      HAND SURGERY Left     HERNIA REPAIR      INGUINAL HERNIA REPAIR      [place] COLONOSCOPY FLX DX W/COLLJ SPEC WHEN PFRMD N/A 1/10/2017    Procedure: COLONOSCOPY;  Surgeon: Sheryle Congress, MD;  Location: [facility]; Service: Gastroenterology    [place] CYSTO/URETERO W/LITHOTRIPSY &INDWELL STENT INSRT Left 2/1/2018    Procedure: CYSTOSCOPY, LEFT URETEROSCOPY, RETROGRADE PYELOGRAM;  Surgeon: Adeel Briseno MD;  [facility];  Service: Urology       Family History   Problem Relation Age of Onset    Lung cancer Mother     Lung cancer Father      I have reviewed and agree with the history as documented  Social History     Tobacco Use    Smoking status: Never Smoker    Smokeless tobacco: Never Used   Substance Use Topics    Alcohol use: Yes     Comment: occasional/social alcohol use     Drug use: No        Review of Systems   Cardiovascular: Positive for chest pain  All other systems reviewed and are negative  Physical Exam  Physical Exam   Constitutional: He is oriented to person, place, and time  He appears well-developed and well-nourished  HENT:   Head: Normocephalic and atraumatic  Eyes: Pupils are equal, round, and reactive to light  EOM are normal    Neck: Normal range of motion  Neck supple  Cardiovascular: Normal rate, regular rhythm and normal heart sounds  No murmur heard  Pulmonary/Chest: Effort normal and breath sounds normal  No respiratory distress  He has no wheezes  Abdominal: Soft  Bowel sounds are normal  He exhibits no distension  There is no tenderness  Musculoskeletal: Normal range of motion  He exhibits tenderness  He exhibits no edema     Neurological: He is alert and oriented to person, place, and time  No cranial nerve deficit  Coordination normal    Skin: Skin is warm and dry  He is not diaphoretic  No erythema  Psychiatric: He has a normal mood and affect  His behavior is normal    Nursing note and vitals reviewed        Vital Signs  ED Triage Vitals [09/14/19 0107]   Temperature Pulse Respirations Blood Pressure SpO2   (!) 97 3 °F (36 3 °C) 68 20 138/85 98 %      Temp Source Heart Rate Source Patient Position - Orthostatic VS BP Location FiO2 (%)   Oral Monitor Sitting Right arm --      Pain Score       8           Vitals:    09/14/19 0107 09/14/19 0215   BP: 138/85 130/77   Pulse: 68 66   Patient Position - Orthostatic VS: Sitting Sitting         Visual Acuity  Visual Acuity      Most Recent Value   L Pupil Size (mm)  3   R Pupil Size (mm)  3          ED Medications  Medications   diclofenac sodium (VOLTAREN) 1 % topical gel 2 g (has no administration in time range)   acetaminophen (TYLENOL) tablet 975 mg (has no administration in time range)   ibuprofen (MOTRIN) tablet 400 mg (has no administration in time range)   iohexol (OMNIPAQUE) 350 MG/ML injection (MULTI-DOSE) 85 mL (85 mL Intravenous Given 9/14/19 0149)       Diagnostic Studies  Results Reviewed     Procedure Component Value Units Date/Time    POCT Chem 8+ [[id number]]  (Abnormal) Collected:  09/14/19 0144    Lab Status:  Final result Specimen:  Venous Updated:  09/14/19 0156     SODIUM, I-STAT 142 mmol/l      Potassium, i-STAT 4 0 mmol/L      Chloride, istat 106 mmol/L      CO2, i-STAT 25 mmol/L      Anion Gap, i-STAT 16 mmol/L      Calcium, Ionized i-STAT 1 17 mmol/L      BUN, I-STAT 21 mg/dl      Creatinine, i-STAT 1 1 mg/dl      eGFR 72 ml/min/1 73sq m      Glucose, i-STAT 108 mg/dl      Hct, i-STAT 40 %      Hgb, i-STAT 13 6 g/dl      Specimen Type VENOUS    Narrative:       National Kidney Disease Foundation guidelines for Chronic Kidney Disease (CKD):     Stage 1 with normal or high GFR (GFR > 90 mL/min/1 73 square meters)    Stage 2 Mild CKD (GFR = 60-89 mL/min/1 73 square meters)    Stage 3A Moderate CKD (GFR = 45-59 mL/min/1 73 square meters)    Stage 3B Moderate CKD (GFR = 30-44 mL/min/1 73 square meters)    Stage 4 Severe CKD (GFR = 15-29 mL/min/1 73 square meters)    Stage 5 End Stage CKD (GFR <15 mL/min/1 73 square meters)  Note: GFR calculation is accurate only with a steady state creatinine                 CT head without contrast   Final Result by Gissell Pérez MD (09/14 0214)      No intracranial hemorrhage or calvarial fracture  Workstation performed: [id number]         CT spine cervical without contrast   Final Result by Gissell Pérez MD (09/14 4280)      No cervical spine fracture or traumatic malalignment  Workstation performed: [id number]         CT chest with contrast   Final Result by Gissell Pérez MD (09/14 5963)      Acute minimally displaced fracture at the lateral right 6th rib and nondisplaced fracture at the lateral right 7th rib  Workstation performed: [id number]                    Procedures  Procedures       ED Course  ED Course as of Sep 14 0231   Sat Sep 14, 2019   0223 Acute minimally displaced fracture at the lateral right 6th rib and nondisplaced fracture at the lateral right 7th rib  MDM    Disposition  Final diagnoses:   Rib fracture     Time reflects when diagnosis was documented in both MDM as applicable and the Disposition within this note     Time User Action Codes Description Comment    9/14/2019  2:24 AM Melodie Munir [address] Rib fracture       ED Disposition     ED Disposition Condition Date/Time Comment    Discharge Stable Sat Sep 14, 2019  2:24 AM Roma Kawasaki Zoltack discharge to home/self care              Follow-up Information     Follow up With Specialties Details Why Contact Info    Major Douglass, DO Family Medicine In 2 days  [address]  [phone] Patient's Medications   Discharge Prescriptions    ACETAMINOPHEN (TYLENOL) 325 MG TABLET    Take 2 tablets (650 mg total) by mouth every 6 (six) hours as needed for mild pain for up to 5 days       Start Date: 9/14/2019 End Date: 9/19/2019       Order Dose: 650 mg       Quantity: 30 tablet    Refills: 0    IBUPROFEN (MOTRIN) 400 MG TABLET    Take 1 tablet (400 mg total) by mouth every 6 (six) hours as needed for mild pain for up to 5 days       Start Date: 9/14/2019 End Date: 9/19/2019       Order Dose: 400 mg       Quantity: 30 tablet    Refills: 0    OXYCODONE (ROXICODONE) 5 MG IMMEDIATE RELEASE TABLET    Take 1 tablet (5 mg total) by mouth every 8 (eight) hours as needed for moderate pain for up to 3 daysMax Daily Amount: 15 mg       Start Date: 9/14/2019 End Date: 9/17/2019       Order Dose: 5 mg       Quantity: 8 tablet    Refills: 0     No discharge procedures on file      ED Provider  Electronically Signed by           Keanu Scott MD  09/14/19 9114

## 2019-09-14 NOTE — ED NOTES
Incentive spirometry provided to patient  Detailed education given on use  Offers no further questions at time of discharge        Barbara Baez RN  09/14/19 3827

## 2019-10-05 LAB — PSA SERPL-MCNC: 4.4 NG/ML

## 2019-10-14 ENCOUNTER — OFFICE VISIT (OUTPATIENT)
Dept: UROLOGY | Facility: AMBULATORY SURGERY CENTER | Age: 59
End: 2019-10-14
Payer: OTHER MISCELLANEOUS

## 2019-10-14 VITALS
BODY MASS INDEX: 22.76 KG/M2 | HEART RATE: 65 BPM | HEIGHT: 70 IN | WEIGHT: 159 LBS | SYSTOLIC BLOOD PRESSURE: 125 MMHG | DIASTOLIC BLOOD PRESSURE: 75 MMHG

## 2019-10-14 DIAGNOSIS — R35.0 URINARY FREQUENCY: ICD-10-CM

## 2019-10-14 DIAGNOSIS — R97.20 ELEVATED PSA: Primary | ICD-10-CM

## 2019-10-14 DIAGNOSIS — R35.1 BPH ASSOCIATED WITH NOCTURIA: ICD-10-CM

## 2019-10-14 DIAGNOSIS — N40.1 BPH ASSOCIATED WITH NOCTURIA: ICD-10-CM

## 2019-10-14 PROCEDURE — 99214 OFFICE O/P EST MOD 30 MIN: CPT | Performed by: NURSE PRACTITIONER

## 2019-10-14 RX ORDER — LORAZEPAM 1 MG/1
1 TABLET ORAL ONCE
Qty: 1 TABLET | Refills: 0 | Status: SHIPPED | OUTPATIENT
Start: 2019-10-14 | End: 2020-08-20

## 2019-10-14 RX ORDER — TAMSULOSIN HYDROCHLORIDE 0.4 MG/1
0.4 CAPSULE ORAL
Qty: 90 CAPSULE | Refills: 1 | Status: SHIPPED | OUTPATIENT
Start: 2019-10-14 | End: 2020-06-15

## 2019-10-14 RX ORDER — CIPROFLOXACIN 500 MG/1
500 TABLET, FILM COATED ORAL EVERY 12 HOURS SCHEDULED
Qty: 2 TABLET | Refills: 0 | Status: SHIPPED | OUTPATIENT
Start: 2019-10-14 | End: 2019-10-15

## 2019-10-14 NOTE — PATIENT INSTRUCTIONS
Cipro and fleets enema to be used prior to prostate biopsy  Call the office for  Concerning symptoms or questions

## 2019-10-14 NOTE — PROGRESS NOTES
10/14/2019    Chief Complaint   Patient presents with    Elevated PSA     PSA done 10/4/19 4 4     Assessment and Plan    62 y o  male managed by Dr Iliana Shore    1  Benign prostatic hyperplasia  · Continue to monitor urinary symptoms  · Bladder Scan PVR at next office visit    2  Elevated PSA  · PSA performed 10/04/2019 is 4 4  · Recommend Prostate Biopsy  · Cipro, Lorazepam and Fleets enema prescription provided  · Next office visit to be determined according to physician availability for prostate biopsy  · Repeat PSA in 3 months    3  Nephrolithiasis  · Status post left ureteroscopy 02/01/2018  · CT abdomen pelvis stone study performed 09/04/2019 reveals bilateral multiple pole punctate nonobstructing renal calculi measuring up to 4 mm  No hydronephrosis noted  · Dietary and behavioral modifications as discussed in the office     History of Present Illness  Ingrid Arechiga is a 62 y o  male here for follow up evaluation of elevated PSA  His most recent PSA performed 10/04/2019 is 4 4  PSA  Trend as below  Patient with a significant history of nephrolithiasis and is status post left ureteroscopy with laser lithotripsy 02/01/2018  He currently has no complaints of suprapubic abdominal discomfort or flank pain  He denies dysuria, hematuria, urinary frequency and urgency and reports sensation of complete bladder emptying with urination  Component       PSA, Total   Latest Ref Rng & Units       < OR = 4 0 ng/mL   6/20/2018      9:01 AM 3 1   7/15/2019      9:30 AM 4 8 (H)   8/15/2019      8:32 AM 4 5 (H)   10/4/2019      8:52 AM 4 4 (H)       Review of Systems   Constitutional: Negative for chills and fever  Respiratory: Negative for cough and shortness of breath  Cardiovascular: Negative for chest pain  Gastrointestinal: Negative for abdominal distention, abdominal pain, blood in stool, nausea and vomiting     Genitourinary: Negative for difficulty urinating, dysuria, enuresis, flank pain, frequency, hematuria and urgency  Musculoskeletal: Negative for arthralgias  Skin: Negative for rash  Neurological: Negative for dizziness  AUA SYMPTOM SCORE      Most Recent Value   AUA SYMPTOM SCORE   How often have you had a sensation of not emptying your bladder completely after you finished urinating? 0   How often have you had to urinate again less than two hours after you finished urinating? 3   How often have you found you stopped and started again several times when you urinate? 1   How often have you found it difficult to postpone urination? 0   How often have you had a weak urinary stream?  2   How often have you had to push or strain to begin urination? 1   How many times did you most typically get up to urinate from the time you went to bed at night until the time you got up in the morning? 3   Quality of Life: If you were to spend the rest of your life with your urinary condition just the way it is now, how would you feel about that?  4   AUA SYMPTOM SCORE  10         Past Medical History  Past Medical History:   Diagnosis Date    Abnormal blood chemistry     Allergic rhinitis     last assessed 08/22/2014    Anxiety     Erectile dysfunction of non-organic origin     Hyperglycemia     Last Assessed: 1/20/2016     Hyperlipidemia     Kidney stone     Paresthesia of foot     last assessed 08/14/2015       Past Social History  Past Surgical History:   Procedure Laterality Date    FOOT SURGERY      HAND SURGERY Left     HERNIA REPAIR      INGUINAL HERNIA REPAIR      AL COLONOSCOPY FLX DX W/COLLJ SPEC WHEN PFRMD N/A 1/10/2017    Procedure: COLONOSCOPY;  Surgeon: Teresa Ramsey MD;  Location: [facility];   Service: Gastroenterology    AL CYSTO/URETERO W/LITHOTRIPSY &INDWELL STENT INSRT Left 2/1/2018    Procedure: CYSTOSCOPY, LEFT URETEROSCOPY, RETROGRADE PYELOGRAM;  Surgeon: Ambar Flanagan MD;  Location: [facility];  Service: Urology     Social History     Tobacco Use   Smoking Status Never Smoker   Smokeless Tobacco Never Used       Past Family History  Family History   Problem Relation Age of Onset    Lung cancer Mother     Lung cancer Father        Past Social history  Social History     Socioeconomic History    Marital status:      Spouse name: Not on file    Number of children: Not on file    Years of education: Not on file    Highest education level: Not on file   Occupational History    Not on file   Social Needs    Financial resource strain: Not on file    Food insecurity:     Worry: Not on file     Inability: Not on file    Transportation needs:     Medical: Not on file     Non-medical: Not on file   Tobacco Use    Smoking status: Never Smoker    Smokeless tobacco: Never Used   Substance and Sexual Activity    Alcohol use: Yes     Comment: occasional/social alcohol use     Drug use: No    Sexual activity: Not on file   Lifestyle    Physical activity:     Days per week: Not on file     Minutes per session: Not on file    Stress: Not on file   Relationships    Social connections:     Talks on phone: Not on file     Gets together: Not on file     Attends Zoroastrianism service: Not on file     Active member of club or organization: Not on file     Attends meetings of clubs or organizations: Not on file     Relationship status: Not on file    Intimate partner violence:     Fear of current or ex partner: Not on file     Emotionally abused: Not on file     Physically abused: Not on file     Forced sexual activity: Not on file   Other Topics Concern    Not on file   Social History Narrative    Feels Safe at home     No guns in the home     Always uses seat belt        Current Medications  Current Outpatient Medications   Medication Sig Dispense Refill    ascorbic acid (VITAMIN C) 500 mg tablet Take 500 mg by mouth daily      Cetirizine HCl (ZYRTEC ALLERGY) 10 MG CAPS Take by mouth daily as needed      DAILY MULTIPLE VITAMINS/IRON PO Take 1 tablet by mouth daily      simvastatin (ZOCOR) 20 mg tablet Take one tablet by mouth daily 90 tablet 1    bisacodyl (FLEET) 10 MG/30ML ENEM Us the morning of the scheduled procedure 30 mL 0    ciprofloxacin (CIPRO) 500 mg tablet Take 1 tablet (500 mg total) by mouth every 12 (twelve) hours for 2 doses 2 tablet 0    ibuprofen (MOTRIN) 400 mg tablet Take 1 tablet (400 mg total) by mouth every 6 (six) hours as needed for mild pain for up to 5 days 30 tablet 0     No current facility-administered medications for this visit  Allergies  Allergies   Allergen Reactions    Other      trees    Pollen Extract      The following portions of the patient's history were reviewed and updated as appropriate: allergies, current medications, past medical history, past social history, past surgical history and problem list     Vitals  Vitals:    10/14/19 1035   BP: 125/75   BP Location: Left arm   Patient Position: Sitting   Cuff Size: Adult   Pulse: 65   Weight: 72 1 kg (159 lb)   Height: 5' 10" (1 778 m)     Physical Exam  Physical Exam   Constitutional: He is oriented to person, place, and time  He appears well-developed and well-nourished  HENT:   Head: Atraumatic  Eyes: EOM are normal    Neck: Neck supple  Cardiovascular: Normal rate  Pulmonary/Chest: Effort normal  No respiratory distress  Abdominal: Soft  Musculoskeletal: Normal range of motion  Neurological: He is alert and oriented to person, place, and time  Skin: Skin is warm and dry  Psychiatric: He has a normal mood and affect  Vitals reviewed      Results  No results found for this or any previous visit (from the past 1 hour(s)) ]  Lab Results   Component Value Date    PSA 4 4 (H) 10/04/2019    PSA 4 5 (H) 08/15/2019    PSA 4 8 (H) 07/15/2019     Lab Results   Component Value Date    GLUCOSE 108 09/14/2019    CALCIUM 9 5 07/15/2019     02/14/2017    K 4 2 07/15/2019    CO2 25 09/14/2019     07/15/2019    BUN 19 07/15/2019    CREATININE 0 96 07/15/2019 Lab Results   Component Value Date    WBC 4 5 07/15/2019    HGB 13 6 09/14/2019    HCT 40 09/14/2019    MCV 90 5 07/15/2019     07/15/2019     Orders  Orders Placed This Encounter   Procedures    Biopsy prostate     Standing Status:   Future     Standing Expiration Date:   4/14/2021       Romana Bushman, CRNP

## 2019-11-29 ENCOUNTER — TELEPHONE (OUTPATIENT)
Dept: UROLOGY | Facility: MEDICAL CENTER | Age: 59
End: 2019-11-29

## 2019-11-29 ENCOUNTER — APPOINTMENT (OUTPATIENT)
Dept: LAB | Facility: CLINIC | Age: 59
End: 2019-11-29
Payer: COMMERCIAL

## 2019-11-29 DIAGNOSIS — R35.0 URINARY FREQUENCY: Primary | ICD-10-CM

## 2019-11-29 DIAGNOSIS — R35.0 URINARY FREQUENCY: ICD-10-CM

## 2019-11-29 LAB
BACTERIA UR QL AUTO: ABNORMAL /HPF
BILIRUB UR QL STRIP: NEGATIVE
CLARITY UR: CLEAR
COLOR UR: YELLOW
GLUCOSE UR STRIP-MCNC: NEGATIVE MG/DL
HGB UR QL STRIP.AUTO: NEGATIVE
KETONES UR STRIP-MCNC: NEGATIVE MG/DL
LEUKOCYTE ESTERASE UR QL STRIP: NEGATIVE
MUCOUS THREADS UR QL AUTO: ABNORMAL
NITRITE UR QL STRIP: NEGATIVE
NON-SQ EPI CELLS URNS QL MICRO: ABNORMAL /HPF
PH UR STRIP.AUTO: 6 [PH]
PROT UR STRIP-MCNC: NEGATIVE MG/DL
RBC #/AREA URNS AUTO: ABNORMAL /HPF
SP GR UR STRIP.AUTO: 1.02 (ref 1–1.03)
UROBILINOGEN UR QL STRIP.AUTO: 0.2 E.U./DL
WBC #/AREA URNS AUTO: ABNORMAL /HPF

## 2019-11-29 PROCEDURE — 87086 URINE CULTURE/COLONY COUNT: CPT

## 2019-11-29 PROCEDURE — 81001 URINALYSIS AUTO W/SCOPE: CPT

## 2019-11-29 NOTE — TELEPHONE ENCOUNTER
Patient of Dr Prasanna Sosa seen at [facility] office  Patient called to report frequent urination, frequent urge  No burning, no blood noted  Patient can be reached at [phone]  Thank you

## 2019-11-29 NOTE — TELEPHONE ENCOUNTER
Patient managed by Dr Jeniffer Kovacs, seen in the [place] office  Patient with history of elevated PSA, BPH and nephrolithiasis  CT on 9/4/19 showed bilateral multiple pole punctate nonobstructing stones up to 4 mm  Patient currently prescribed Flomax  Pending plan for prostate biopsy on 12/27/19 with Dr Jeniffer Kovacs, repeat PSA prior to this appointment  Returned call to patient  Patient reports symptoms started 4-5 days ago  Reports significant urinary frequency  Denies burning or other symptoms  Patient reports his bladder does feel empty after urination, but feels urine stream is weaker  Patient to go to [facility] for UA and urine culture to check for infection today  Patient knows to call office if symptoms worsen  Orders for urine testing placed

## 2019-11-30 LAB — BACTERIA UR CULT: NORMAL

## 2019-12-03 NOTE — TELEPHONE ENCOUNTER
Recommend starting Flomax as prescribed at last office visit for treatment of BPH with lower urinary tract symptoms  Review dietary recommendations

## 2019-12-03 NOTE — TELEPHONE ENCOUNTER
Called patient back and explained again the he should take the flomax, and he understands  Explained with his BPH that is probably why he urinating frequency    Also explained that he should be drinking more water and avoiding coffee, tea and bladder irritants, He will  flomax

## 2019-12-03 NOTE — TELEPHONE ENCOUNTER
Called and spoke with Mayco Mcclure he is urinating every hour and at night gets up every 3 hours  Flomax was ordered and his last appointment in Oct ness, and he never filled it   Are here any other suggestions to aid with his frequency since his urine culture was negative

## 2019-12-27 ENCOUNTER — PROCEDURE VISIT (OUTPATIENT)
Dept: UROLOGY | Facility: AMBULATORY SURGERY CENTER | Age: 59
End: 2019-12-27
Payer: COMMERCIAL

## 2019-12-27 VITALS
HEIGHT: 70 IN | DIASTOLIC BLOOD PRESSURE: 80 MMHG | HEART RATE: 92 BPM | SYSTOLIC BLOOD PRESSURE: 124 MMHG | WEIGHT: 157 LBS | BODY MASS INDEX: 22.48 KG/M2

## 2019-12-27 DIAGNOSIS — R97.20 ELEVATED PSA: Primary | ICD-10-CM

## 2019-12-27 PROCEDURE — 88344 IMHCHEM/IMCYTCHM EA MLT ANTB: CPT | Performed by: PATHOLOGY

## 2019-12-27 PROCEDURE — 76942 ECHO GUIDE FOR BIOPSY: CPT | Performed by: UROLOGY

## 2019-12-27 PROCEDURE — 88342 IMHCHEM/IMCYTCHM 1ST ANTB: CPT | Performed by: PATHOLOGY

## 2019-12-27 PROCEDURE — G0416 PROSTATE BIOPSY, ANY MTHD: HCPCS | Performed by: PATHOLOGY

## 2019-12-27 PROCEDURE — 76872 US TRANSRECTAL: CPT | Performed by: UROLOGY

## 2019-12-27 PROCEDURE — 51710 CHANGE OF BLADDER TUBE: CPT | Performed by: UROLOGY

## 2019-12-27 RX ORDER — CIPROFLOXACIN 500 MG/1
TABLET, FILM COATED ORAL
Refills: 0 | COMMUNITY
Start: 2019-11-29 | End: 2020-01-16 | Stop reason: ALTCHOICE

## 2019-12-27 NOTE — PROGRESS NOTES
Biopsy prostate     Date/Time 12/27/2019 2:35 PM     Performed by  Rukhsana Torres MD     Authorized by Rukhsana Torres MD            Bam Theodore is a 62year old male with a PSA of 4 4 who presents to undergo transrectal ultrasound-guided biopsy of the prostate  Prostate Biopsy note: The patient returns to the office today to undergo a transrectal ultrasound-guided biopsy of the prostate secondary to an elevated PSA  Risk and benefits of the procedure were discussed  Informed consent was obtained  The patient's prebiopsy preparation was deemed to be adequate  Antibiotics had been taken as prescribed  If appropriate blood thinners had been placed on hold  The patient completed an enema as prescribed  The patient was placed in the lateral decubitus position  Digital rectal examination was performed revealing a 30 gram prostate  Viscous lidocaine jelly was instilled into the rectum  Transrectal ultrasonography was then performed  The prostate measured 28 grams  5 cc of 2% lidocaine were then injected bilaterally between the junction of the base of the prostate and the seminal vesicles  Ultrasound guidance was utilized to place the needle into proper position for the administration of the local anesthetic  A standard 12 core biopsy was then performed  Three cores were taken bilaterally from each peripheral zone and 3 cores bilaterally from each central zone  Overall the patient tolerated the procedure and there were no complications  My overall impression status post transrectal ultrasound and biopsy the prostate secondary to an elevated PSA  I have recommended rest and completing antibiotics  Possible postbiopsy sequelae were discussed including hematuria and hematospermia  I've asked that he return in the next one to 2 weeks to review the results of the biopsy

## 2019-12-30 ENCOUNTER — TELEPHONE (OUTPATIENT)
Dept: UROLOGY | Facility: AMBULATORY SURGERY CENTER | Age: 59
End: 2019-12-30

## 2019-12-30 NOTE — TELEPHONE ENCOUNTER
Patient managed by Ralph Kern is calling to say that he was doing fine since his biopsy but today he noticed he had some minor bleeding and with few clots  He would like a call back to discuss  If it worsens he will call back after hours answering service

## 2020-01-02 NOTE — TELEPHONE ENCOUNTER
Patient of Jovana/Mahamed  History elevated PSA  Patient has TRUS bx in office on 12/27/19  Call placed to patient to discuss  Left message to call office back  Office number provided on [employer]

## 2020-01-03 NOTE — TELEPHONE ENCOUNTER
Call placed to patient, he states that he is no longer experiencing any bleeding  Confirmed next appointment  Advised patient to call office with any concerns in meantime

## 2020-01-10 LAB
ALBUMIN SERPL-MCNC: 3.9 G/DL (ref 3.6–5.1)
ALBUMIN/GLOB SERPL: 1.2 (CALC) (ref 1–2.5)
ALP SERPL-CCNC: 43 U/L (ref 40–115)
ALT SERPL-CCNC: 19 U/L (ref 9–46)
AST SERPL-CCNC: 19 U/L (ref 10–35)
BILIRUB SERPL-MCNC: 0.5 MG/DL (ref 0.2–1.2)
BUN SERPL-MCNC: 21 MG/DL (ref 7–25)
BUN/CREAT SERPL: ABNORMAL (CALC) (ref 6–22)
CALCIUM SERPL-MCNC: 9.1 MG/DL (ref 8.6–10.3)
CHLORIDE SERPL-SCNC: 107 MMOL/L (ref 98–110)
CHOLEST SERPL-MCNC: 164 MG/DL
CHOLEST/HDLC SERPL: 4.8 (CALC)
CO2 SERPL-SCNC: 28 MMOL/L (ref 20–32)
CREAT SERPL-MCNC: 1.01 MG/DL (ref 0.7–1.25)
GLOBULIN SER CALC-MCNC: 3.2 G/DL (CALC) (ref 1.9–3.7)
GLUCOSE SERPL-MCNC: 100 MG/DL (ref 65–99)
HBA1C MFR BLD: 5.5 % OF TOTAL HGB
HDLC SERPL-MCNC: 34 MG/DL
LDLC SERPL CALC-MCNC: 110 MG/DL (CALC)
NONHDLC SERPL-MCNC: 130 MG/DL (CALC)
POTASSIUM SERPL-SCNC: 4.3 MMOL/L (ref 3.5–5.3)
PROT SERPL-MCNC: 7.1 G/DL (ref 6.1–8.1)
PSA SERPL-MCNC: 8.9 NG/ML
SL AMB EGFR AFRICAN AMERICAN: 93 ML/MIN/1.73M2
SL AMB EGFR NON AFRICAN AMERICAN: 80 ML/MIN/1.73M2
SODIUM SERPL-SCNC: 141 MMOL/L (ref 135–146)
TRIGL SERPL-MCNC: 98 MG/DL

## 2020-01-16 ENCOUNTER — OFFICE VISIT (OUTPATIENT)
Dept: FAMILY MEDICINE CLINIC | Facility: CLINIC | Age: 60
End: 2020-01-16
Payer: COMMERCIAL

## 2020-01-16 VITALS
TEMPERATURE: 97.1 F | HEIGHT: 71 IN | RESPIRATION RATE: 17 BRPM | HEART RATE: 93 BPM | SYSTOLIC BLOOD PRESSURE: 124 MMHG | DIASTOLIC BLOOD PRESSURE: 80 MMHG | BODY MASS INDEX: 22.44 KG/M2 | WEIGHT: 160.25 LBS | OXYGEN SATURATION: 98 %

## 2020-01-16 DIAGNOSIS — R73.03 PREDIABETES: ICD-10-CM

## 2020-01-16 DIAGNOSIS — Z13.29 SCREENING FOR THYROID DISORDER: ICD-10-CM

## 2020-01-16 DIAGNOSIS — R97.20 ELEVATED PSA: Primary | ICD-10-CM

## 2020-01-16 DIAGNOSIS — E78.2 MIXED HYPERLIPIDEMIA: ICD-10-CM

## 2020-01-16 DIAGNOSIS — Z23 NEED FOR INFLUENZA VACCINATION: ICD-10-CM

## 2020-01-16 DIAGNOSIS — Z13.0 SCREENING FOR DEFICIENCY ANEMIA: ICD-10-CM

## 2020-01-16 PROCEDURE — 99214 OFFICE O/P EST MOD 30 MIN: CPT | Performed by: FAMILY MEDICINE

## 2020-01-16 PROCEDURE — 3008F BODY MASS INDEX DOCD: CPT | Performed by: FAMILY MEDICINE

## 2020-01-16 PROCEDURE — 1036F TOBACCO NON-USER: CPT | Performed by: FAMILY MEDICINE

## 2020-01-16 PROCEDURE — 90682 RIV4 VACC RECOMBINANT DNA IM: CPT | Performed by: FAMILY MEDICINE

## 2020-01-16 PROCEDURE — 90471 IMMUNIZATION ADMIN: CPT | Performed by: FAMILY MEDICINE

## 2020-01-16 RX ORDER — SIMVASTATIN 20 MG
TABLET ORAL
Qty: 90 TABLET | Refills: 1 | Status: CANCELLED | OUTPATIENT
Start: 2020-01-16

## 2020-01-16 RX ORDER — ROSUVASTATIN CALCIUM 10 MG/1
10 TABLET, COATED ORAL DAILY
Qty: 90 TABLET | Refills: 1 | Status: SHIPPED | OUTPATIENT
Start: 2020-01-16 | End: 2020-07-19

## 2020-01-16 NOTE — ASSESSMENT & PLAN NOTE
History of elevated PSAs  Patient had prostate biopsy done on December 27th  He recently spoke to his urologist and was told results were negative    Cont f/u w/ Dr Esmer Hurtado as directed

## 2020-01-16 NOTE — ASSESSMENT & PLAN NOTE
Cholesterol 164/110 with HDL of 34  Patient currently taking simvastatin 20 mg daily  Will switch to rosuvastatin 10 mg daily, and titrate if needed    Recheck labs in 6 months followed by appointment

## 2020-01-16 NOTE — PROGRESS NOTES
[facility]      NAME: Gladis Santos  AGE: 62 y o  SEX: male  : 1960   MRN: [id number]    DATE: 2020  TIME: 3:11 PM    Assessment and Plan     Problem List Items Addressed This Visit     Hyperlipidemia      Cholesterol 164/110 with HDL of 34  Patient currently taking simvastatin 20 mg daily  Will switch to rosuvastatin 10 mg daily, and titrate if needed  Recheck labs in 6 months followed by appointment         Relevant Medications    rosuvastatin (CRESTOR) 10 MG tablet    Other Relevant Orders    Lipid Panel with Direct LDL reflex    Prediabetes    Relevant Orders    Comprehensive metabolic panel    Hemoglobin A1c (w/out EAG)    Elevated PSA - Primary      History of elevated PSAs  Patient had prostate biopsy done on   He recently spoke to his urologist and was told results were negative  Cont f/u w/ Dr Jai Arredondo as directed           Other Visit Diagnoses     Screening for thyroid disorder        Relevant Orders    TSH, 3rd generation with Free T4 reflex    Screening for deficiency anemia        Relevant Orders    CBC and differential              Return to office in:  6 months, fasting blood work prior at [facility] given today    Chief Complaint     Chief Complaint   Patient presents with    Follow-up     6m check up to chronic conditions and to discuss blood work from 2020       History of Present Illness      Patient presents for recheck of chronic medical problems today  Overall patient feels well  He recently had prostate biopsy, and was told that results were negative  Doing well on simvastatin for cholesterol  Patient still exercising regularly    Patient had labs drawn on       The following portions of the patient's history were reviewed and updated as appropriate: allergies, current medications, past family history, past medical history, past social history, past surgical history and problem list     Review of Systems Review of Systems   Respiratory: Negative  Cardiovascular: Negative  Gastrointestinal: Negative  Genitourinary: Negative  Active Problem List     Patient Active Problem List   Diagnosis    BPH with obstruction/lower urinary tract symptoms    Erectile dysfunction    History of kidney stones    Anxiety    Hyperlipidemia    Prediabetes    Allergic rhinitis due to pollen    Elevated PSA       Objective   /80 (BP Location: Left arm, Patient Position: Sitting, Cuff Size: Adult)   Pulse 93   Temp (!) 97 1 °F (36 2 °C) (Tympanic)   Resp 17   Ht 5' 11" (1 803 m)   Wt 72 7 kg (160 lb 4 oz)   SpO2 98%   BMI 22 35 kg/m²     Physical Exam   Cardiovascular: Normal rate, regular rhythm, normal heart sounds and intact distal pulses  Carotids: no bruits  Ext: no edema   Pulmonary/Chest: Effort normal  No respiratory distress  He has no wheezes  He has no rales  Psychiatric: He has a normal mood and affect   His behavior is normal  Thought content normal        Pertinent Laboratory/Diagnostic Studies:    Labs from January 9th reviewed    Current Medications     Current Outpatient Medications:     ascorbic acid (VITAMIN C) 500 mg tablet, Take 500 mg by mouth daily, Disp: , Rfl:     Cetirizine HCl (ZYRTEC ALLERGY) 10 MG CAPS, Take by mouth daily as needed, Disp: , Rfl:     DAILY MULTIPLE VITAMINS/IRON PO, Take 1 tablet by mouth daily, Disp: , Rfl:     tamsulosin (FLOMAX) 0 4 mg, Take 1 capsule (0 4 mg total) by mouth daily with dinner, Disp: 90 capsule, Rfl: 1    ibuprofen (MOTRIN) 400 mg tablet, Take 1 tablet (400 mg total) by mouth every 6 (six) hours as needed for mild pain for up to 5 days, Disp: 30 tablet, Rfl: 0    LORazepam (ATIVAN) 1 mg tablet, Take 1 tablet (1 mg total) by mouth once for 1 dose, Disp: 1 tablet, Rfl: 0    rosuvastatin (CRESTOR) 10 MG tablet, Take 1 tablet (10 mg total) by mouth daily, Disp: 90 tablet, Rfl: 1    Health Maintenance     Health Maintenance   Topic Date Due    Hepatitis C Screening  1960    Annual Physical  02/14/1976    Influenza Vaccine  07/01/2019    HIV Screening  01/17/2020 (Originally 2/14/1973)    DTaP,Tdap,and Td Vaccines (1 - Tdap) 09/05/2020 (Originally 2/14/1969)    Pneumococcal Vaccine: Pediatrics (0 to 5 Years) and At-Risk Patients (6 to 59 Years) (1 of 1 - PPSV23) 01/16/2021 (Originally 2/14/1966)    Depression Screening PHQ  01/16/2021    BMI: Adult  01/16/2021    CRC Screening: Colonoscopy  01/10/2022    Pneumococcal Vaccine: 65+ Years (1 of 2 - PCV13) 02/14/2023    Hepatitis B Vaccine  Completed    HIB Vaccine  Aged Out    IPV Vaccine  Aged Out    Hepatitis A Vaccine  Aged Out    Meningococcal ACWY Vaccine  Aged Out    HPV Vaccine  Aged Out     Immunization History   Administered Date(s) Administered    Hep A / Hep B 03/06/2003, 05/23/2003, 11/10/2003    Influenza Quadrivalent, 6-35 Months IM 12/29/2015, 08/21/2017    Influenza TIV (IM) 10/25/2013    Influenza, recombinant, quadrivalent,injectable, preservative free 09/05/2018       Alvin Newsome DO  [facility]

## 2020-01-29 ENCOUNTER — TELEPHONE (OUTPATIENT)
Dept: UROLOGY | Facility: MEDICAL CENTER | Age: 60
End: 2020-01-29

## 2020-01-29 DIAGNOSIS — R97.20 ELEVATED PSA: Primary | ICD-10-CM

## 2020-01-29 NOTE — TELEPHONE ENCOUNTER
Patient managed by Dr Radha Martinez in [place] with history of elevated PSA, BPH, and Nephrolithiasis  Patient called with question regarding PSA results (8 9) from 01/09/2020  Patient had gone over lab work with PCP and now wondered if elevated result could be falsely elevated due to Prostate Biopsy done on 12/27/2019 and was only concerned as his appointment for tomorrow was rescheduled for 04/21/2020  Educated Patient on causes of false elevation (including inflammatory process, recent biopsy, recent ejaculation, riding motorcycle/lawnmower)  Also informed Patient that Dr Radha Martinez was aware of result when rescheduling was done  Offered to route note to provider for further review and to advise  Patient repeats back understanding and agrees with plan

## 2020-01-30 NOTE — TELEPHONE ENCOUNTER
Patient called to inform him that provider would like a follow-up PSA to re-evaluation and that it is ordered and Patient encouraged to go to lab at his convenience  Educated on common causes of false elevation in PSA like inflammatory processes, riding motorcycle/lawnmowers, ejaculation, etc  Patient repeats back understanding and agrees with plan

## 2020-02-29 ENCOUNTER — APPOINTMENT (OUTPATIENT)
Dept: LAB | Age: 60
End: 2020-02-29
Payer: COMMERCIAL

## 2020-02-29 DIAGNOSIS — R97.20 ELEVATED PSA: ICD-10-CM

## 2020-02-29 LAB — PSA SERPL-MCNC: 3.9 NG/ML (ref 0–4)

## 2020-02-29 PROCEDURE — 84153 ASSAY OF PSA TOTAL: CPT

## 2020-03-02 ENCOUNTER — TELEPHONE (OUTPATIENT)
Dept: UROLOGY | Facility: MEDICAL CENTER | Age: 60
End: 2020-03-02

## 2020-03-02 NOTE — TELEPHONE ENCOUNTER
----- Message from Corona Hoyt  sent at 3/2/2020  3:16 PM EST -----  Okay to notify patient of PSA resulted 02/29/2020 at 3 9    Further discussion and plan of care to be discussed at next office visit with Dr Marizol Carpio as scheduled

## 2020-03-02 NOTE — TELEPHONE ENCOUNTER
Patient notified of PSA results of 3 9  He will keep FU appt as scheduled with Dr Prasanna Sosa in April  appt details confirmed  discussed My chart and new code emailed to him today

## 2020-04-16 ENCOUNTER — TELEPHONE (OUTPATIENT)
Dept: UROLOGY | Facility: AMBULATORY SURGERY CENTER | Age: 60
End: 2020-04-16

## 2020-04-30 ENCOUNTER — TELEMEDICINE (OUTPATIENT)
Dept: UROLOGY | Facility: AMBULATORY SURGERY CENTER | Age: 60
End: 2020-04-30
Payer: COMMERCIAL

## 2020-04-30 DIAGNOSIS — R97.20 ELEVATED PSA: Primary | ICD-10-CM

## 2020-04-30 PROCEDURE — 99213 OFFICE O/P EST LOW 20 MIN: CPT | Performed by: UROLOGY

## 2020-06-13 DIAGNOSIS — R35.1 BPH ASSOCIATED WITH NOCTURIA: ICD-10-CM

## 2020-06-13 DIAGNOSIS — R35.0 URINARY FREQUENCY: ICD-10-CM

## 2020-06-13 DIAGNOSIS — N40.1 BPH ASSOCIATED WITH NOCTURIA: ICD-10-CM

## 2020-06-15 RX ORDER — TAMSULOSIN HYDROCHLORIDE 0.4 MG/1
CAPSULE ORAL
Qty: 30 CAPSULE | Refills: 5 | Status: SHIPPED | OUTPATIENT
Start: 2020-06-15 | End: 2020-08-20

## 2020-07-19 DIAGNOSIS — E78.2 MIXED HYPERLIPIDEMIA: ICD-10-CM

## 2020-07-19 RX ORDER — ROSUVASTATIN CALCIUM 10 MG/1
TABLET, COATED ORAL
Qty: 30 TABLET | Refills: 5 | Status: SHIPPED | OUTPATIENT
Start: 2020-07-19 | End: 2021-02-22

## 2020-07-29 LAB
ALBUMIN SERPL-MCNC: 4.1 G/DL (ref 3.6–5.1)
ALBUMIN/GLOB SERPL: 1.3 (CALC) (ref 1–2.5)
ALP SERPL-CCNC: 41 U/L (ref 35–144)
ALT SERPL-CCNC: 44 U/L (ref 9–46)
AST SERPL-CCNC: 30 U/L (ref 10–35)
BASOPHILS # BLD AUTO: 48 CELLS/UL (ref 0–200)
BASOPHILS NFR BLD AUTO: 1 %
BILIRUB SERPL-MCNC: 0.7 MG/DL (ref 0.2–1.2)
BUN SERPL-MCNC: 26 MG/DL (ref 7–25)
BUN/CREAT SERPL: 25 (CALC) (ref 6–22)
CALCIUM SERPL-MCNC: 9.3 MG/DL (ref 8.6–10.3)
CHLORIDE SERPL-SCNC: 105 MMOL/L (ref 98–110)
CHOLEST SERPL-MCNC: 167 MG/DL
CHOLEST/HDLC SERPL: 4.8 (CALC)
CO2 SERPL-SCNC: 27 MMOL/L (ref 20–32)
CREAT SERPL-MCNC: 1.06 MG/DL (ref 0.7–1.25)
EOSINOPHIL # BLD AUTO: 197 CELLS/UL (ref 15–500)
EOSINOPHIL NFR BLD AUTO: 4.1 %
ERYTHROCYTE [DISTWIDTH] IN BLOOD BY AUTOMATED COUNT: 12.8 % (ref 11–15)
GLOBULIN SER CALC-MCNC: 3.2 G/DL (CALC) (ref 1.9–3.7)
GLUCOSE SERPL-MCNC: 106 MG/DL (ref 65–99)
HBA1C MFR BLD: 5.7 % OF TOTAL HGB
HCT VFR BLD AUTO: 45 % (ref 38.5–50)
HDLC SERPL-MCNC: 35 MG/DL
HGB BLD-MCNC: 14.8 G/DL (ref 13.2–17.1)
LDLC SERPL CALC-MCNC: 103 MG/DL (CALC)
LYMPHOCYTES # BLD AUTO: 725 CELLS/UL (ref 850–3900)
LYMPHOCYTES NFR BLD AUTO: 15.1 %
MCH RBC QN AUTO: 29.8 PG (ref 27–33)
MCHC RBC AUTO-ENTMCNC: 32.9 G/DL (ref 32–36)
MCV RBC AUTO: 90.5 FL (ref 80–100)
MONOCYTES # BLD AUTO: 485 CELLS/UL (ref 200–950)
MONOCYTES NFR BLD AUTO: 10.1 %
NEUTROPHILS # BLD AUTO: 3346 CELLS/UL (ref 1500–7800)
NEUTROPHILS NFR BLD AUTO: 69.7 %
NONHDLC SERPL-MCNC: 132 MG/DL (CALC)
PLATELET # BLD AUTO: 262 THOUSAND/UL (ref 140–400)
PMV BLD REES-ECKER: 12.1 FL (ref 7.5–12.5)
POTASSIUM SERPL-SCNC: 4.4 MMOL/L (ref 3.5–5.3)
PROT SERPL-MCNC: 7.3 G/DL (ref 6.1–8.1)
RBC # BLD AUTO: 4.97 MILLION/UL (ref 4.2–5.8)
SL AMB EGFR AFRICAN AMERICAN: 87 ML/MIN/1.73M2
SL AMB EGFR NON AFRICAN AMERICAN: 75 ML/MIN/1.73M2
SODIUM SERPL-SCNC: 139 MMOL/L (ref 135–146)
TRIGL SERPL-MCNC: 169 MG/DL
TSH SERPL-ACNC: 2.13 MIU/L (ref 0.4–4.5)
WBC # BLD AUTO: 4.8 THOUSAND/UL (ref 3.8–10.8)

## 2020-08-20 ENCOUNTER — OFFICE VISIT (OUTPATIENT)
Dept: FAMILY MEDICINE CLINIC | Facility: CLINIC | Age: 60
End: 2020-08-20
Payer: COMMERCIAL

## 2020-08-20 VITALS
SYSTOLIC BLOOD PRESSURE: 120 MMHG | HEIGHT: 71 IN | OXYGEN SATURATION: 97 % | RESPIRATION RATE: 16 BRPM | BODY MASS INDEX: 23.1 KG/M2 | DIASTOLIC BLOOD PRESSURE: 72 MMHG | TEMPERATURE: 98.2 F | WEIGHT: 165 LBS | HEART RATE: 77 BPM

## 2020-08-20 DIAGNOSIS — E78.2 MIXED HYPERLIPIDEMIA: Primary | ICD-10-CM

## 2020-08-20 DIAGNOSIS — R73.03 PREDIABETES: ICD-10-CM

## 2020-08-20 DIAGNOSIS — R97.20 ELEVATED PSA: ICD-10-CM

## 2020-08-20 PROCEDURE — 3008F BODY MASS INDEX DOCD: CPT | Performed by: FAMILY MEDICINE

## 2020-08-20 PROCEDURE — 99214 OFFICE O/P EST MOD 30 MIN: CPT | Performed by: FAMILY MEDICINE

## 2020-08-20 PROCEDURE — 1036F TOBACCO NON-USER: CPT | Performed by: FAMILY MEDICINE

## 2020-08-20 NOTE — ASSESSMENT & PLAN NOTE
Cholesterol 167/103  Doing well since switching from simvastatin to rosuvastatin 10    Will continue to monitor

## 2020-08-20 NOTE — ASSESSMENT & PLAN NOTE
History of elevated PSA  Patient had biopsy done, which was negative    He is still being followed by Urology, Dr Gabby Fuentes

## 2020-08-20 NOTE — PROGRESS NOTES
[facility]      NAME: Charli Nance  AGE: 56 y o  SEX: male  : 1960   MRN: [id number]    DATE: 2020  TIME: 3:27 PM    Assessment and Plan     Problem List Items Addressed This Visit     Hyperlipidemia - Primary      Cholesterol 167/103  Doing well since switching from simvastatin to rosuvastatin 10  Will continue to monitor         Relevant Orders    Lipid Panel with Direct LDL reflex    TSH, 3rd generation with Free T4 reflex    Prediabetes      Blood sugar from  was 106 with A1c 5 7%  This is been stable  Continue diet exercise  Will continue to monitor         Relevant Orders    Comprehensive metabolic panel    TSH, 3rd generation with Free T4 reflex    Hemoglobin A1c (w/out EAG)    Elevated PSA      History of elevated PSA  Patient had biopsy done, which was negative  He is still being followed by Urology, Dr Rojas Myers                   Return to office in:  6 months, fasting blood work prior ( PSA is done by his urologist )  Chief Complaint     Chief Complaint   Patient presents with    Follow-up     6 months check up       History of Present Illness       Patient presents for recheck chronic medical problems today  Overall is feeling well without complaints  Recently had arthroscopic surgery on his left knee  Doing well on rosuvastatin for cholesterol  Still sees urologist regarding elevated PSA  History of prediabetes  Patient had labs drawn on       The following portions of the patient's history were reviewed and updated as appropriate: allergies, current medications, past family history, past medical history, past social history, past surgical history and problem list     Review of Systems   Review of Systems   Respiratory: Negative  Cardiovascular: Negative  Gastrointestinal: Negative  Genitourinary: Negative          Active Problem List     Patient Active Problem List   Diagnosis    BPH with obstruction/lower urinary tract symptoms    Erectile dysfunction    History of kidney stones    Anxiety    Hyperlipidemia    Prediabetes    Allergic rhinitis due to pollen    Elevated PSA       Objective   /72 (BP Location: Left arm, Patient Position: Sitting, Cuff Size: Adult)   Pulse 77   Temp 98 2 °F (36 8 °C) (Tympanic)   Resp 16   Ht 5' 10 87" (1 8 m)   Wt 74 8 kg (165 lb)   SpO2 97%   BMI 23 10 kg/m²     Physical Exam  Cardiovascular:      Rate and Rhythm: Normal rate and regular rhythm  Heart sounds: Normal heart sounds  Comments: Carotids: no bruits  Ext: no edema  Pulmonary:      Effort: Pulmonary effort is normal  No respiratory distress  Breath sounds: No wheezing or rales  Psychiatric:         Behavior: Behavior normal          Thought Content:  Thought content normal          Pertinent Laboratory/Diagnostic Studies:   labs from July 28th reviewed    Current Medications     Current Outpatient Medications:     ascorbic acid (VITAMIN C) 500 mg tablet, Take 500 mg by mouth daily, Disp: , Rfl:     Cetirizine HCl (ZYRTEC ALLERGY) 10 MG CAPS, Take by mouth daily as needed, Disp: , Rfl:     DAILY MULTIPLE VITAMINS/IRON PO, Take 1 tablet by mouth daily, Disp: , Rfl:     rosuvastatin (CRESTOR) 10 MG tablet, TAKE 1 TABLET BY MOUTH EVERY DAY, Disp: 30 tablet, Rfl: 5    ibuprofen (MOTRIN) 400 mg tablet, Take 1 tablet (400 mg total) by mouth every 6 (six) hours as needed for mild pain for up to 5 days, Disp: 30 tablet, Rfl: 0    Health Maintenance     Health Maintenance   Topic Date Due    Hepatitis C Screening  1960    HIV Screening  02/14/1973    Annual Physical  02/14/1976    Influenza Vaccine  07/01/2020    Depression Screening PHQ  01/16/2021    DTaP,Tdap,and Td Vaccines (1 - Tdap) 09/05/2020 (Originally 2/14/1979)    BMI: Adult  08/20/2021    Colonoscopy Surveillance  01/10/2022    Colorectal Cancer Screening  01/10/2027    Pneumococcal Vaccine: Pediatrics (0 to 5 Years) and At-Risk Patients (6 to 59 Years)  Aged Out    HIB Vaccine  Aged Out    Hepatitis B Vaccine  Aged Out    IPV Vaccine  Aged Out    Hepatitis A Vaccine  Aged Out    Meningococcal ACWY Vaccine  Aged Out    HPV Vaccine  Aged [employer] History   Administered Date(s) Administered    Hep A / Hep B 03/06/2003, 05/23/2003, 11/10/2003    Influenza Quadrivalent, 6-35 Months IM 12/29/2015, 08/21/2017    Influenza TIV (IM) 10/25/2013    Influenza, recombinant, quadrivalent,injectable, preservative free 09/05/2018, 01/16/2020       Arnold Mcardle, DO  [facility]

## 2020-08-20 NOTE — ASSESSMENT & PLAN NOTE
Blood sugar from July 28th was 106 with A1c 5 7%  This is been stable  Continue diet exercise    Will continue to monitor

## 2020-08-24 ENCOUNTER — TELEPHONE (OUTPATIENT)
Dept: FAMILY MEDICINE CLINIC | Facility: CLINIC | Age: 60
End: 2020-08-24

## 2020-08-24 NOTE — TELEPHONE ENCOUNTER
Spoke with patient he was in contact but second hand with covid  I advised him to look for symptoms and call us back

## 2020-11-09 ENCOUNTER — LAB (OUTPATIENT)
Dept: LAB | Facility: CLINIC | Age: 60
End: 2020-11-09
Payer: COMMERCIAL

## 2020-11-09 DIAGNOSIS — R97.20 ELEVATED PSA: ICD-10-CM

## 2020-11-09 LAB — PSA SERPL-MCNC: 4.5 NG/ML (ref 0–4)

## 2020-11-09 PROCEDURE — 84153 ASSAY OF PSA TOTAL: CPT

## 2020-11-12 ENCOUNTER — OFFICE VISIT (OUTPATIENT)
Dept: UROLOGY | Facility: AMBULATORY SURGERY CENTER | Age: 60
End: 2020-11-12
Payer: COMMERCIAL

## 2020-11-12 VITALS
DIASTOLIC BLOOD PRESSURE: 84 MMHG | BODY MASS INDEX: 23.34 KG/M2 | TEMPERATURE: 97.8 F | WEIGHT: 163 LBS | HEART RATE: 87 BPM | SYSTOLIC BLOOD PRESSURE: 116 MMHG | HEIGHT: 70 IN

## 2020-11-12 DIAGNOSIS — R97.20 ELEVATED PSA: ICD-10-CM

## 2020-11-12 DIAGNOSIS — R35.1 BPH ASSOCIATED WITH NOCTURIA: Primary | ICD-10-CM

## 2020-11-12 DIAGNOSIS — N40.1 BPH ASSOCIATED WITH NOCTURIA: Primary | ICD-10-CM

## 2020-11-12 DIAGNOSIS — R35.0 URINARY FREQUENCY: ICD-10-CM

## 2020-11-12 LAB
POST-VOID RESIDUAL VOLUME, ML POC: 26 ML
SL AMB  POCT GLUCOSE, UA: NORMAL
SL AMB LEUKOCYTE ESTERASE,UA: NORMAL
SL AMB POCT BILIRUBIN,UA: NORMAL
SL AMB POCT BLOOD,UA: NORMAL
SL AMB POCT CLARITY,UA: CLEAR
SL AMB POCT COLOR,UA: YELLOW
SL AMB POCT KETONES,UA: NORMAL
SL AMB POCT NITRITE,UA: NORMAL
SL AMB POCT PH,UA: 6
SL AMB POCT SPECIFIC GRAVITY,UA: 1.01
SL AMB POCT URINE PROTEIN: NORMAL
SL AMB POCT UROBILINOGEN: NORMAL

## 2020-11-12 PROCEDURE — 99213 OFFICE O/P EST LOW 20 MIN: CPT | Performed by: UROLOGY

## 2020-11-12 PROCEDURE — 1036F TOBACCO NON-USER: CPT | Performed by: UROLOGY

## 2020-11-12 PROCEDURE — 3008F BODY MASS INDEX DOCD: CPT | Performed by: UROLOGY

## 2020-11-12 PROCEDURE — 81002 URINALYSIS NONAUTO W/O SCOPE: CPT | Performed by: UROLOGY

## 2020-11-12 PROCEDURE — 51798 US URINE CAPACITY MEASURE: CPT | Performed by: UROLOGY

## 2021-02-22 DIAGNOSIS — E78.2 MIXED HYPERLIPIDEMIA: ICD-10-CM

## 2021-02-22 RX ORDER — ROSUVASTATIN CALCIUM 10 MG/1
TABLET, COATED ORAL
Qty: 30 TABLET | Refills: 1 | Status: SHIPPED | OUTPATIENT
Start: 2021-02-22 | End: 2021-03-08 | Stop reason: SDUPTHER

## 2021-03-03 LAB
ALBUMIN SERPL-MCNC: 4 G/DL (ref 3.6–5.1)
ALBUMIN/GLOB SERPL: 1.3 (CALC) (ref 1–2.5)
ALP SERPL-CCNC: 33 U/L (ref 35–144)
ALT SERPL-CCNC: 23 U/L (ref 9–46)
AST SERPL-CCNC: 17 U/L (ref 10–35)
BILIRUB SERPL-MCNC: 0.6 MG/DL (ref 0.2–1.2)
BUN SERPL-MCNC: 26 MG/DL (ref 7–25)
BUN/CREAT SERPL: 29 (CALC) (ref 6–22)
CALCIUM SERPL-MCNC: 9 MG/DL (ref 8.6–10.3)
CHLORIDE SERPL-SCNC: 105 MMOL/L (ref 98–110)
CHOLEST SERPL-MCNC: 183 MG/DL
CHOLEST/HDLC SERPL: 4.7 (CALC)
CO2 SERPL-SCNC: 25 MMOL/L (ref 20–32)
CREAT SERPL-MCNC: 0.91 MG/DL (ref 0.7–1.25)
GLOBULIN SER CALC-MCNC: 3 G/DL (CALC) (ref 1.9–3.7)
GLUCOSE SERPL-MCNC: 107 MG/DL (ref 65–99)
HBA1C MFR BLD: 5.5 % OF TOTAL HGB
HDLC SERPL-MCNC: 39 MG/DL
LDLC SERPL CALC-MCNC: 115 MG/DL (CALC)
NONHDLC SERPL-MCNC: 144 MG/DL (CALC)
POTASSIUM SERPL-SCNC: 4.2 MMOL/L (ref 3.5–5.3)
PROT SERPL-MCNC: 7 G/DL (ref 6.1–8.1)
SL AMB EGFR AFRICAN AMERICAN: 104 ML/MIN/1.73M2
SL AMB EGFR NON AFRICAN AMERICAN: 89 ML/MIN/1.73M2
SODIUM SERPL-SCNC: 138 MMOL/L (ref 135–146)
TRIGL SERPL-MCNC: 177 MG/DL
TSH SERPL-ACNC: 2.44 MIU/L (ref 0.4–4.5)

## 2021-03-08 ENCOUNTER — OFFICE VISIT (OUTPATIENT)
Dept: FAMILY MEDICINE CLINIC | Facility: CLINIC | Age: 61
End: 2021-03-08
Payer: COMMERCIAL

## 2021-03-08 VITALS
OXYGEN SATURATION: 97 % | DIASTOLIC BLOOD PRESSURE: 70 MMHG | WEIGHT: 167 LBS | BODY MASS INDEX: 23.91 KG/M2 | SYSTOLIC BLOOD PRESSURE: 110 MMHG | TEMPERATURE: 97.2 F | HEART RATE: 78 BPM | HEIGHT: 70 IN | RESPIRATION RATE: 16 BRPM

## 2021-03-08 DIAGNOSIS — E78.2 MIXED HYPERLIPIDEMIA: Primary | ICD-10-CM

## 2021-03-08 DIAGNOSIS — R73.03 PREDIABETES: ICD-10-CM

## 2021-03-08 DIAGNOSIS — R97.20 ELEVATED PSA: ICD-10-CM

## 2021-03-08 PROCEDURE — 3008F BODY MASS INDEX DOCD: CPT | Performed by: FAMILY MEDICINE

## 2021-03-08 PROCEDURE — 99214 OFFICE O/P EST MOD 30 MIN: CPT | Performed by: FAMILY MEDICINE

## 2021-03-08 RX ORDER — ROSUVASTATIN CALCIUM 10 MG/1
10 TABLET, COATED ORAL DAILY
Qty: 90 TABLET | Refills: 1 | Status: SHIPPED | OUTPATIENT
Start: 2021-03-08 | End: 2021-09-08 | Stop reason: SDUPTHER

## 2021-03-08 NOTE — ASSESSMENT & PLAN NOTE
Cholesterol from March 2nd 183/115  Doing reasonably well on rosuvastatin 10 mg daily    Will continue to monitor yearly

## 2021-03-08 NOTE — PROGRESS NOTES
[address] [facility]      NAME: Piper Martinez  AGE: 59 y o  SEX: male  : 1960   MRN: [id number]    DATE: 3/8/2021  TIME: 3:54 PM    Assessment and Plan     Problem List Items Addressed This Visit     Hyperlipidemia - Primary      Cholesterol from  183/115  Doing reasonably well on rosuvastatin 10 mg daily  Will continue to monitor yearly         Relevant Medications    rosuvastatin (CRESTOR) 10 MG tablet    Prediabetes      Blood sugar from  was 107 with A1c 5 5%  Patient states he still has a " sweet tooth"  Continue diet exercise  Will continue to monitor         Elevated PSA      History of elevated PSA  Status post negative biopsy  Last level was 3 9  Continue regular follow-up with his urologist                   Return to office in:  6 months, yearly labs 2022    Chief Complaint     Chief Complaint   Patient presents with    Follow-up     6 months       History of Present Illness      Patient presents recheck chronic medical problems today  Doing well on rosuvastatin for cholesterol  Still being followed by urology for elevated PSA  History of elevated blood sugar  Patient still exercising regularly  He had labs done       The following portions of the patient's history were reviewed and updated as appropriate: allergies, current medications, past family history, past medical history, past social history, past surgical history and problem list     Review of Systems   Review of Systems   Respiratory: Negative  Cardiovascular: Negative  Gastrointestinal: Negative  Genitourinary: Negative          Active Problem List     Patient Active Problem List   Diagnosis    BPH with obstruction/lower urinary tract symptoms    Erectile dysfunction    History of kidney stones    Anxiety    Hyperlipidemia    Prediabetes    Allergic rhinitis due to pollen    Elevated PSA       Objective   /70 (BP Location: Left arm, Patient Position: Sitting, Cuff Size: Adult)   Pulse 78   Temp (!) 97 2 °F (36 2 °C) (Tympanic)   Resp 16   Ht 5' 9 69" (1 77 m)   Wt 75 8 kg (167 lb)   SpO2 97%   BMI 24 18 kg/m²     Physical Exam  Cardiovascular:      Rate and Rhythm: Normal rate and regular rhythm  Heart sounds: Normal heart sounds  Comments: Carotids: no bruits  Ext: no edema  Pulmonary:      Effort: Pulmonary effort is normal  No respiratory distress  Breath sounds: No wheezing or rales  Psychiatric:         Behavior: Behavior normal          Thought Content:  Thought content normal          Pertinent Laboratory/Diagnostic Studies:  Labs March 2nd    Current Medications     Current Outpatient Medications:     ascorbic acid (VITAMIN C) 500 mg tablet, Take 500 mg by mouth daily, Disp: , Rfl:     Cetirizine HCl (ZYRTEC ALLERGY) 10 MG CAPS, Take by mouth daily as needed, Disp: , Rfl:     DAILY MULTIPLE VITAMINS/IRON PO, Take 1 tablet by mouth daily, Disp: , Rfl:     rosuvastatin (CRESTOR) 10 MG tablet, Take 1 tablet (10 mg total) by mouth daily, Disp: 90 tablet, Rfl: 1    ibuprofen (MOTRIN) 400 mg tablet, Take 1 tablet (400 mg total) by mouth every 6 (six) hours as needed for mild pain for up to 5 days, Disp: 30 tablet, Rfl: 0    Health Maintenance     Health Maintenance   Topic Date Due    Hepatitis C Screening  1960    HIV Screening  02/14/1973    Annual Physical  02/14/1976    DTaP,Tdap,and Td Vaccines (1 - Tdap) 02/14/1979    Influenza Vaccine (1) 09/01/2020    Depression Screening PHQ  01/16/2021    Colonoscopy Surveillance  01/10/2022    BMI: Adult  03/08/2022    Colorectal Cancer Screening  01/10/2027    Pneumococcal Vaccine: Pediatrics (0 to 5 Years) and At-Risk Patients (6 to 59 Years)  Aged Out    HIB Vaccine  Aged Out    Hepatitis B Vaccine  Aged Out    IPV Vaccine  Aged Out    Hepatitis A Vaccine  Aged Out    Meningococcal ACWY Vaccine  Aged Out    HPV Vaccine  Aged [employer] History Administered Date(s) Administered    Hep A / Hep B 03/06/2003, 05/23/2003, 11/10/2003    Influenza Quadrivalent, 6-35 Months IM 12/29/2015, 08/21/2017    Influenza, recombinant, quadrivalent,injectable, preservative free 09/05/2018, 01/16/2020    Influenza, seasonal, injectable 10/25/2013       Courtney Seen, DO  [facility]

## 2021-03-08 NOTE — ASSESSMENT & PLAN NOTE
Blood sugar from March 2nd was 107 with A1c 5 5%  Patient states he still has a " sweet tooth"  Continue diet exercise    Will continue to monitor

## 2021-03-08 NOTE — ASSESSMENT & PLAN NOTE
History of elevated PSA  Status post negative biopsy  Last level was 3 9    Continue regular follow-up with his urologist

## 2021-04-07 ENCOUNTER — TELEMEDICINE (OUTPATIENT)
Dept: FAMILY MEDICINE CLINIC | Facility: CLINIC | Age: 61
End: 2021-04-07
Payer: COMMERCIAL

## 2021-04-07 DIAGNOSIS — Z20.822 EXPOSURE TO COVID-19 VIRUS: Primary | ICD-10-CM

## 2021-04-07 PROCEDURE — 99213 OFFICE O/P EST LOW 20 MIN: CPT | Performed by: FAMILY MEDICINE

## 2021-04-07 PROCEDURE — 1036F TOBACCO NON-USER: CPT | Performed by: FAMILY MEDICINE

## 2021-04-07 NOTE — PROGRESS NOTES
COVID-19 Outpatient Progress Note    Assessment/Plan:    Problem List Items Addressed This Visit     None      Visit Diagnoses     Exposure to COVID-19 virus    -  Primary    Relevant Orders    Novel Coronavirus (Covid-19),PCR [facility] - [facility] at   [facility] or [facility]         Disposition:     I recommended COVID-19 PCR testing on or after day 5 since last exposure and if negative can end quarantine after 7 days  Patient was instructed to watch for symptoms until 14 days after exposure  If patient were to develop symptoms, they should immediately self isolate and call our office for further guidance  I have spent 10 minutes directly with the patient  Greater than 50% of this time was spent in counseling/coordination of care regarding: prognosis, risks and benefits of treatment options and instructions for management  Encounter provider Jewels Martinez DO    Provider located at [place] [address]  [address] [facility] [place] [address] [place] [address]  [facility]  [phone]    Recent Visits  No visits were found meeting these conditions  Showing recent visits within past 7 days and meeting all other requirements     Today's Visits  Date Type Provider Dept   04/07/21 Telemedicine Jewels Martinez DO [facility]   Showing today's visits and meeting all other requirements     Future Appointments  No visits were found meeting these conditions  Showing future appointments within next 150 days and meeting all other requirements      This virtual check-in was done via [employer] and patient was informed that this is a secure, HIPAA-compliant platform  He agrees to proceed  Patient agrees to participate in a virtual check in via telephone or video visit instead of presenting to the office to address urgent/immediate medical needs  Patient is aware this is a billable service  After connecting through [facility], the patient was identified by name and date of birth   Jose Mix Baltazar Stroud was informed that this was a telemedicine visit and that the exam was being conducted confidentially over secure lines  Meseret Corbin acknowledged consent and understanding of privacy and security of the telemedicine visit  I informed the patient that I have reviewed his record in Epic and presented the opportunity for him to ask any questions regarding the visit today  The patient agreed to participate  Subjective:   Meseret Corbin is a 59 y o  male who is concerned about COVID-19  Patient is currently asymptomatic  Patient denies fever, chills, fatigue, malaise, congestion, rhinorrhea, sore throat, anosmia, loss of taste, cough, shortness of breath, chest tightness, abdominal pain, nausea, vomiting, diarrhea, myalgias and headaches       Date of exposure: 4/6/2021    Exposure:   Contact with a person who is under investigation (PUI) for or who is positive for COVID-19 within the last 14 days?: Yes    Hospitalized recently for fever and/or lower respiratory symptoms?: No      Currently a healthcare worker that is involved in direct patient care?: No      Works in a special setting where the risk of COVID-19 transmission may be high? (this may include long-term care, correctional and half-way facilities; homeless shelters; assisted-living facilities and group homes ): No      Resident in a special setting where the risk of COVID-19 transmission may be high? (this may include long-term care, correctional and half-way facilities; homeless shelters; assisted-living facilities and group homes ): No      No results found for: Elsy Parks, [address] [place] [address], [address] [place] [address],[address], [place] [address]  Past Medical History:   Diagnosis Date    Abnormal blood chemistry     Allergic rhinitis     last assessed 08/22/2014    Anxiety     Elevated PSA     Erectile dysfunction of non-organic origin     Hyperglycemia     Last Assessed: 1/20/2016     Hyperlipidemia     Kidney stone     Paresthesia of foot     last assessed 08/14/2015     Past Surgical History:   Procedure Laterality Date    FOOT SURGERY      HAND SURGERY Left     HERNIA REPAIR      INGUINAL HERNIA REPAIR      SD COLONOSCOPY FLX DX W/COLLJ SPEC WHEN PFRMD N/A 1/10/2017    Procedure: COLONOSCOPY;  Surgeon: Jaron Cardona MD;  Location: [facility]; Service: Gastroenterology    SD CYSTO/URETERO W/LITHOTRIPSY &INDWELL STENT INSRT Left 2/1/2018    Procedure: CYSTOSCOPY, LEFT URETEROSCOPY, RETROGRADE PYELOGRAM;  Surgeon: Mariel Ridley MD;  [facility];  Service: Urology    PROSTATE BIOPSY  12/27/2019     Current Outpatient Medications   Medication Sig Dispense Refill    ascorbic acid (VITAMIN C) 500 mg tablet Take 500 mg by mouth daily      Cetirizine HCl (ZYRTEC ALLERGY) 10 MG CAPS Take by mouth daily as needed      DAILY MULTIPLE VITAMINS/IRON PO Take 1 tablet by mouth daily      ibuprofen (MOTRIN) 400 mg tablet Take 1 tablet (400 mg total) by mouth every 6 (six) hours as needed for mild pain for up to 5 days 30 tablet 0    rosuvastatin (CRESTOR) 10 MG tablet Take 1 tablet (10 mg total) by mouth daily 90 tablet 1     No current facility-administered medications for this visit  Allergies   Allergen Reactions    Other      trees    Pollen Extract        Review of Systems   Constitutional: Negative for chills, fatigue and fever  HENT: Negative for congestion, rhinorrhea and sore throat  Respiratory: Negative for cough, chest tightness and shortness of breath  Gastrointestinal: Negative for abdominal pain, diarrhea, nausea and vomiting  Musculoskeletal: Negative for myalgias  Neurological: Negative for headaches  Objective: There were no vitals filed for this visit  Physical Exam  Constitutional:       General: He is not in acute distress  Appearance: He is well-developed  He is not diaphoretic  HENT:      Head: Atraumatic  Eyes:      Pupils: Pupils are equal, round, and reactive to light     Neck: Musculoskeletal: Normal range of motion  Pulmonary:      Effort: Pulmonary effort is normal  No respiratory distress  Skin:     Coloration: Skin is not pale  Findings: No rash  Psychiatric:         Behavior: Behavior normal          Thought Content: Thought content normal          Judgment: Judgment normal        VIRTUAL VISIT DISCLAIMER    Marianna Pinoyadira acknowledges that he has consented to an online visit or consultation  He understands that the online visit is based solely on information provided by him, and that, in the absence of a face-to-face physical evaluation by the physician, the diagnosis he receives is both limited and provisional in terms of accuracy and completeness  This is not intended to replace a full medical face-to-face evaluation by the physician  Marianna Pemberton understands and accepts these terms

## 2021-04-13 DIAGNOSIS — Z23 ENCOUNTER FOR IMMUNIZATION: ICD-10-CM

## 2021-05-07 ENCOUNTER — APPOINTMENT (OUTPATIENT)
Dept: LAB | Age: 61
End: 2021-05-07
Payer: COMMERCIAL

## 2021-05-07 DIAGNOSIS — R97.20 ELEVATED PSA: ICD-10-CM

## 2021-05-07 LAB — PSA SERPL-MCNC: 5.4 NG/ML (ref 0–4)

## 2021-05-07 PROCEDURE — 84153 ASSAY OF PSA TOTAL: CPT

## 2021-05-12 ENCOUNTER — OFFICE VISIT (OUTPATIENT)
Dept: UROLOGY | Facility: AMBULATORY SURGERY CENTER | Age: 61
End: 2021-05-12
Payer: COMMERCIAL

## 2021-05-12 VITALS
WEIGHT: 161 LBS | DIASTOLIC BLOOD PRESSURE: 80 MMHG | HEART RATE: 82 BPM | HEIGHT: 70 IN | BODY MASS INDEX: 23.05 KG/M2 | SYSTOLIC BLOOD PRESSURE: 120 MMHG

## 2021-05-12 DIAGNOSIS — R35.1 BPH ASSOCIATED WITH NOCTURIA: ICD-10-CM

## 2021-05-12 DIAGNOSIS — R35.0 URINARY FREQUENCY: Primary | ICD-10-CM

## 2021-05-12 DIAGNOSIS — N40.1 BPH ASSOCIATED WITH NOCTURIA: ICD-10-CM

## 2021-05-12 DIAGNOSIS — R97.20 ELEVATED PSA: ICD-10-CM

## 2021-05-12 PROCEDURE — 99214 OFFICE O/P EST MOD 30 MIN: CPT | Performed by: NURSE PRACTITIONER

## 2021-05-12 PROCEDURE — 3008F BODY MASS INDEX DOCD: CPT | Performed by: NURSE PRACTITIONER

## 2021-05-12 PROCEDURE — 1036F TOBACCO NON-USER: CPT | Performed by: NURSE PRACTITIONER

## 2021-05-12 NOTE — PROGRESS NOTES
5/12/2021      Chief Complaint   Patient presents with    Elevated PSA     Assessment and Plan    59 y o  male managed by Dr Nano Garcia    1  Elevated PSA  ·  status post prostate biopsy 12/27/2019  Positive for focal high-grade prostatic intraepithelial neoplasm, negative for malignancy  · PSA performed 05/07/2021 resulted 5 4  · TRENT- 35-40 g with no nodules noted  ·  will order MRI of prostate to be performed prior to next office visit  ·  repeat PSA in 3 months  ·  follow up in the office in 3 months    2  Benign prostatic hyperplasia  · Continue to monitor symptoms off of medication  ·  urine dip and PVR at next office visit  · Continue to maintain hydration   · Avoid bladder irritating foods and beverages    3  History nephrolithiasis  ·   No new/ recent occurrences of stone formation    History of Present Illness  Adam Montes is a 59 y o  male here for follow up evaluation of benign prostatic hyperplasia, elevated PSA  Patient has a history of nephrolithiasis  He denies new/ recent occurrences of stone formation  In regards to urinary symptoms related to benign prostatic hyperplasia, patient currently complains of getting up 1-2 times at night to urinate  He reports no significant other urinary symptoms  He reports sensation of complete bladder emptying with urination  He denies dysuria, hematuria  Patient with a history of elevated PSA  His most recent PSA performed 05/07/2021 resulted 5 4  PSA trend below  He is status post prostate biopsy 12/27/2019 with identification of a small amount of abnormal cells otherwise unremarkable for malignancy        Component       PSA, Total   Latest Ref Rng & Units       0 0 - 4 0 ng/mL   6/20/2018      9:01 AM 3 1   7/15/2019      9:30 AM 4 8 (H)   8/15/2019      8:32 AM 4 5 (H)   10/4/2019      8:52 AM 4 4 (H)   1/9/2020      10:29 AM 8 9 (H)   2/29/2020      8:13 AM 3 9   11/9/2020      6:54 AM 4 5 (H)   5/7/2021      8:24 AM 5 4 (H)       Review of Systems   Constitutional: Negative for chills and fever  Respiratory: Negative for cough and shortness of breath  Cardiovascular: Negative for chest pain  Gastrointestinal: Negative for abdominal distention, abdominal pain, blood in stool, nausea and vomiting  Genitourinary: Negative for difficulty urinating, dysuria, enuresis, flank pain, frequency, hematuria and urgency  Skin: Negative for rash  AUA SYMPTOM SCORE      Most Recent Value   AUA SYMPTOM SCORE   How often have you had a sensation of not emptying your bladder completely after you finished urinating? 0   How often have you had to urinate again less than two hours after you finished urinating? 2   How often have you found you stopped and started again several times when you urinate? 1   How often have you found it difficult to postpone urination? 0   How often have you had a weak urinary stream?  1   How often have you had to push or strain to begin urination? 1   How many times did you most typically get up to urinate from the time you went to bed at night until the time you got up in the morning?   2   Quality of Life: If you were to spend the rest of your life with your urinary condition just the way it is now, how would you feel about that?  3   AUA SYMPTOM SCORE  7             Past Medical History  Past Medical History:   Diagnosis Date    Abnormal blood chemistry     Allergic rhinitis     last assessed 08/22/2014    Anxiety     Elevated PSA     Erectile dysfunction of non-organic origin     Hyperglycemia     Last Assessed: 1/20/2016     Hyperlipidemia     Kidney stone     Paresthesia of foot     last assessed 08/14/2015       Past Social History  Past Surgical History:   Procedure Laterality Date    FOOT SURGERY      HAND SURGERY Left     HERNIA REPAIR      INGUINAL HERNIA REPAIR      AL COLONOSCOPY FLX DX W/COLLJ SPEC WHEN PFRMD N/A 1/10/2017    Procedure: COLONOSCOPY;  Surgeon: Arianna Sorenson MD;  [facility];  Service: Gastroenterology    WA CYSTO/URETERO W/LITHOTRIPSY &INDWELL STENT INSRT Left 2/1/2018    Procedure: CYSTOSCOPY, LEFT URETEROSCOPY, RETROGRADE PYELOGRAM;  Surgeon: Denver Aguilar MD;  Location: [facility];  Service: Urology    PROSTATE BIOPSY  12/27/2019     Social History     Tobacco Use   Smoking Status Never Smoker   Smokeless Tobacco Never Used       Past Family History  Family History   Problem Relation Age of Onset    Lung cancer Mother     Lung cancer Father        Past Social history  Social History     Socioeconomic History    Marital status:      Spouse name: Not on file    Number of children: Not on file    Years of education: Not on file    Highest education level: Not on file   Occupational History    Not on file   Social Needs    Financial resource strain: Not on file    Food insecurity     Worry: Not on file     Inability: Not on file    Transportation needs     Medical: Not on file     Non-medical: Not on file   Tobacco Use    Smoking status: Never Smoker    Smokeless tobacco: Never Used   Substance and Sexual Activity    Alcohol use: Yes     Comment: occasional/social alcohol use     Drug use: No    Sexual activity: Not on file   Lifestyle    Physical activity     Days per week: Not on file     Minutes per session: Not on file    Stress: Not on file   Relationships    Social connections     Talks on phone: Not on file     Gets together: Not on file     Attends Taoism service: Not on file     Active member of club or organization: Not on file     Attends meetings of clubs or organizations: Not on file     Relationship status: Not on file    Intimate partner violence     Fear of current or ex partner: Not on file     Emotionally abused: Not on file     Physically abused: Not on file     Forced sexual activity: Not on file   Other Topics Concern    Not on file   Social History Narrative    Feels Safe at home     No guns in the home     Always uses seat belt        Current Medications  Current Outpatient Medications   Medication Sig Dispense Refill    ascorbic acid (VITAMIN C) 500 mg tablet Take 500 mg by mouth daily      Cetirizine HCl (ZYRTEC ALLERGY) 10 MG CAPS Take by mouth daily as needed      DAILY MULTIPLE VITAMINS/IRON PO Take 1 tablet by mouth daily      rosuvastatin (CRESTOR) 10 MG tablet Take 1 tablet (10 mg total) by mouth daily 90 tablet 1    ibuprofen (MOTRIN) 400 mg tablet Take 1 tablet (400 mg total) by mouth every 6 (six) hours as needed for mild pain for up to 5 days 30 tablet 0     No current facility-administered medications for this visit  Allergies  Allergies   Allergen Reactions    Other      trees    Pollen Extract          The following portions of the patient's history were reviewed and updated as appropriate: allergies, current medications, past medical history, past social history, past surgical history and problem list       Vitals  Vitals:    05/12/21 0756   BP: 120/80   Pulse: 82   Weight: 73 kg (161 lb)   Height: 5' 10" (1 778 m)           Physical Exam  Physical Exam  Vitals signs reviewed  Constitutional:       General: He is not in acute distress  Appearance: Normal appearance  He is normal weight  Pulmonary:      Effort: No respiratory distress  Breath sounds: Normal breath sounds  Genitourinary:     Comments:  TRENT -prostate 35-40 g with no nodules noted  Skin:     General: Skin is warm and dry  Neurological:      General: No focal deficit present  Mental Status: He is alert and oriented to person, place, and time     Psychiatric:         Mood and Affect: Mood normal          Behavior: Behavior normal            Results  No results found for this or any previous visit (from the past 1 hour(s)) ]  Lab Results   Component Value Date    PSA 5 4 (H) 05/07/2021    PSA 4 5 (H) 11/09/2020    PSA 3 9 02/29/2020     Lab Results   Component Value Date    GLUCOSE 108 09/14/2019    CALCIUM 9 0 03/02/2021  02/14/2017    K 4 2 03/02/2021    CO2 25 03/02/2021     03/02/2021    BUN 26 (H) 03/02/2021    CREATININE 0 91 03/02/2021     Lab Results   Component Value Date    WBC 4 8 07/28/2020    HGB 14 8 07/28/2020    HCT 45 0 07/28/2020    MCV 90 5 07/28/2020     07/28/2020           Orders  No orders of the defined types were placed in this encounter        AMADEO Newton

## 2021-06-11 ENCOUNTER — TELEPHONE (OUTPATIENT)
Dept: UROLOGY | Facility: AMBULATORY SURGERY CENTER | Age: 61
End: 2021-06-11

## 2021-06-11 NOTE — TELEPHONE ENCOUNTER
Patient called in requesting medical release form to be emailed to his address [email or web address]  patient is requesting records to be sent to [facility]  Emailed as requested

## 2021-06-14 NOTE — TELEPHONE ENCOUNTER
Received signed authorization from the patient to release information to [facility]  Per patient request, Medical Records were faxed to Jay [facility]co on 6/14/2021

## 2021-08-03 ENCOUNTER — TELEPHONE (OUTPATIENT)
Dept: UROLOGY | Facility: AMBULATORY SURGERY CENTER | Age: 61
End: 2021-08-03

## 2021-08-03 NOTE — TELEPHONE ENCOUNTER
LVM for patient to call me back regarding the MRI currently scheduled  When I went to do the auth there is approved already from another doctor want to make sure he did not have this done already

## 2021-08-09 NOTE — TELEPHONE ENCOUNTER
Patient called to say he had his MRI done already  [facility]

## 2021-08-31 ENCOUNTER — RA CDI HCC (OUTPATIENT)
Dept: OTHER | Facility: HOSPITAL | Age: 61
End: 2021-08-31

## 2021-08-31 NOTE — PROGRESS NOTES
[facility]  coding opportunities       Chart reviewed, no opportunity found: CHART REVIEWED, NO OPPORTUNITY FOUND                        Patients insurance company: [employer] (Medicare Advantage and Commercial)

## 2021-09-08 ENCOUNTER — OFFICE VISIT (OUTPATIENT)
Dept: FAMILY MEDICINE CLINIC | Facility: CLINIC | Age: 61
End: 2021-09-08
Payer: COMMERCIAL

## 2021-09-08 VITALS
TEMPERATURE: 97.9 F | SYSTOLIC BLOOD PRESSURE: 110 MMHG | DIASTOLIC BLOOD PRESSURE: 70 MMHG | BODY MASS INDEX: 23.31 KG/M2 | HEART RATE: 75 BPM | RESPIRATION RATE: 16 BRPM | OXYGEN SATURATION: 97 % | HEIGHT: 70 IN

## 2021-09-08 DIAGNOSIS — N52.9 ERECTILE DYSFUNCTION, UNSPECIFIED ERECTILE DYSFUNCTION TYPE: ICD-10-CM

## 2021-09-08 DIAGNOSIS — R73.03 PREDIABETES: ICD-10-CM

## 2021-09-08 DIAGNOSIS — R97.20 ELEVATED PSA: ICD-10-CM

## 2021-09-08 DIAGNOSIS — E78.2 MIXED HYPERLIPIDEMIA: ICD-10-CM

## 2021-09-08 DIAGNOSIS — Z13.29 SCREENING FOR THYROID DISORDER: ICD-10-CM

## 2021-09-08 DIAGNOSIS — Z00.00 WELL ADULT EXAM: Primary | ICD-10-CM

## 2021-09-08 DIAGNOSIS — L82.0 INFLAMED SEBORRHEIC KERATOSIS: ICD-10-CM

## 2021-09-08 DIAGNOSIS — Z13.0 SCREENING FOR DEFICIENCY ANEMIA: ICD-10-CM

## 2021-09-08 PROCEDURE — 99396 PREV VISIT EST AGE 40-64: CPT | Performed by: FAMILY MEDICINE

## 2021-09-08 PROCEDURE — 1036F TOBACCO NON-USER: CPT | Performed by: FAMILY MEDICINE

## 2021-09-08 PROCEDURE — 3725F SCREEN DEPRESSION PERFORMED: CPT | Performed by: FAMILY MEDICINE

## 2021-09-08 RX ORDER — TADALAFIL 5 MG/1
TABLET ORAL
Qty: 90 TABLET | Refills: 0
Start: 2021-09-08

## 2021-09-08 RX ORDER — ROSUVASTATIN CALCIUM 10 MG/1
10 TABLET, COATED ORAL DAILY
Qty: 90 TABLET | Refills: 1 | Status: SHIPPED | OUTPATIENT
Start: 2021-09-08 | End: 2022-03-09 | Stop reason: SDUPTHER

## 2021-09-08 NOTE — ASSESSMENT & PLAN NOTE
History of mildly elevated blood sugar  Labs from March 2nd showed glucose of 107, A1c 5 5%  Continue reduced carb diet exercise  Patient is due for labs in near future    Will call with results

## 2021-09-08 NOTE — ASSESSMENT & PLAN NOTE
Doing well on rosuvastatin 10 mg daily  Patient is due for fasting blood work  He will get this done shortly  Will call with results    Continue low-fat diet exercise

## 2021-09-08 NOTE — ASSESSMENT & PLAN NOTE
History of elevated PSA  Status post negative biopsy  Being followed by local urologist   Patient recently went for 2nd opinion at [facility]    He had MRI done and will be having a repeat biopsy performed tomorrow in [place]

## 2021-09-08 NOTE — ASSESSMENT & PLAN NOTE
Patient with 5- 6 inflamed seborrheic keratoses on neckline and upper back  Recommend schedule for  shave excision in near future

## 2021-09-08 NOTE — PROGRESS NOTES
[facility]      NAME: Robin Baez  AGE: 59 y o  SEX: male  : 1960   MRN: [id number]    DATE: 2021  TIME: 4:11 PM    Assessment and Plan     Problem List Items Addressed This Visit     Erectile dysfunction      Doing well on Cialis 5 mg daily  This is being prescribed by his urologist         Relevant Medications    tadalafil (CIALIS) 5 MG tablet    Hyperlipidemia      Doing well on rosuvastatin 10 mg daily  Patient is due for fasting blood work  He will get this done shortly  Will call with results  Continue low-fat diet exercise         Relevant Medications    rosuvastatin (CRESTOR) 10 MG tablet    Other Relevant Orders    Lipid Panel with Direct LDL reflex    Prediabetes      History of mildly elevated blood sugar  Labs from  showed glucose of 107, A1c 5 5%  Continue reduced carb diet exercise  Patient is due for labs in near future  Will call with results         Relevant Orders    Comprehensive metabolic panel    Hemoglobin A1C    Elevated PSA      History of elevated PSA  Status post negative biopsy  Being followed by local urologist   Patient recently went for 2nd opinion at [facility]  He had MRI done and will be having a repeat biopsy performed tomorrow in [place]         Inflamed seborrheic keratosis      Patient with 5- 6 inflamed seborrheic keratoses on neckline and upper back  Recommend schedule for  shave excision in near future  Other Visit Diagnoses     Well adult exam    -  Primary    Screening for deficiency anemia        Relevant Orders    CBC and differential    Screening for thyroid disorder        Relevant Orders    TSH, 3rd generation with Free T4 reflex        Patient had COVID vaccination   current with  Colonoscopy     Rx given for routine fasting blood work at [facility]    Will call with results      Return to office in: 6 mos (?labs)    Chief Complaint     Chief Complaint   Patient presents with    Physical Exam       History of Present Illness      Patient presents for recheck chronic medical problems today  Patient has been feeling tired lately  Otherwise no problems  He will be going for repeat biopsy of his prostate tomorrow at [facility]  Doing well on rosuvastatin for cholesterol  The following portions of the patient's history were reviewed and updated as appropriate: allergies, current medications, past family history, past medical history, past social history, past surgical history and problem list     Review of Systems   Review of Systems   Respiratory: Negative  Cardiovascular: Negative  Gastrointestinal: Negative  Genitourinary: Negative  Active Problem List     Patient Active Problem List   Diagnosis    BPH with obstruction/lower urinary tract symptoms    Erectile dysfunction    History of kidney stones    Anxiety    Hyperlipidemia    Prediabetes    Allergic rhinitis due to pollen    Elevated PSA    Inflamed seborrheic keratosis       Objective   /70 (BP Location: Right arm, Patient Position: Sitting, Cuff Size: Adult)   Pulse 75   Temp 97 9 °F (36 6 °C) (Temporal)   Resp 16   Ht 5' 9 69" (1 77 m)   SpO2 97%   BMI 23 31 kg/m²     Physical Exam  Cardiovascular:      Rate and Rhythm: Normal rate and regular rhythm  Heart sounds: Normal heart sounds  Comments: Carotids: no bruits  Ext: no edema  Pulmonary:      Effort: Pulmonary effort is normal  No respiratory distress  Breath sounds: No wheezing or rales  Psychiatric:         Behavior: Behavior normal          Thought Content:  Thought content normal          Pertinent Laboratory/Diagnostic Studies:  last labs reviewed    Current Medications     Current Outpatient Medications:     ascorbic acid (VITAMIN C) 500 mg tablet, Take 500 mg by mouth daily, Disp: , Rfl:     Cetirizine HCl (ZYRTEC ALLERGY) 10 MG CAPS, Take by mouth daily as needed, Disp: , Rfl:     DAILY MULTIPLE VITAMINS/IRON PO, Take 1 tablet by mouth daily, Disp: , Rfl:     rosuvastatin (CRESTOR) 10 MG tablet, Take 1 tablet (10 mg total) by mouth daily, Disp: 90 tablet, Rfl: 1    ibuprofen (MOTRIN) 400 mg tablet, Take 1 tablet (400 mg total) by mouth every 6 (six) hours as needed for mild pain for up to 5 days, Disp: 30 tablet, Rfl: 0    tadalafil (CIALIS) 5 MG tablet, 1 tab daily, Disp: 90 tablet, Rfl: 0    Health Maintenance     Health Maintenance   Topic Date Due    Hepatitis C Screening  Never done    HIV Screening  Never done    DTaP,Tdap,and Td Vaccines (1 - Tdap) Never done    Influenza Vaccine (1) 09/01/2021    Colorectal Cancer Screening  01/10/2022    BMI: Adult  05/12/2022    Depression Screening PHQ  09/08/2022    Annual Physical  09/08/2022    COVID-19 Vaccine  Completed    Pneumococcal Vaccine: Pediatrics (0 to 5 Years) and At-Risk Patients (6 to 59 Years)  Aged Out    HIB Vaccine  Aged Out    Hepatitis B Vaccine  Aged Out    IPV Vaccine  Aged Out    Hepatitis A Vaccine  Aged Out    Meningococcal ACWY Vaccine  Aged Out    HPV Vaccine  Aged [employer] History   Administered Date(s) Administered    Hep A / Hep B 03/06/2003, 05/23/2003, 11/10/2003    Influenza Quadrivalent, 6-35 Months IM 12/29/2015, 08/21/2017    Influenza, recombinant, quadrivalent,injectable, preservative free 09/05/2018, 01/16/2020    Influenza, seasonal, injectable 10/25/2013    SARS-CoV-2 / COVID-19 mRNA IM (Moderna) 03/27/2021, 04/24/2021       Barbra Litten, [facility]

## 2021-09-10 ENCOUNTER — APPOINTMENT (OUTPATIENT)
Dept: LAB | Facility: CLINIC | Age: 61
End: 2021-09-10
Payer: COMMERCIAL

## 2021-09-10 DIAGNOSIS — Z13.0 SCREENING FOR DEFICIENCY ANEMIA: ICD-10-CM

## 2021-09-10 DIAGNOSIS — R73.03 PREDIABETES: ICD-10-CM

## 2021-09-10 DIAGNOSIS — Z13.29 SCREENING FOR THYROID DISORDER: ICD-10-CM

## 2021-09-10 DIAGNOSIS — E78.2 MIXED HYPERLIPIDEMIA: ICD-10-CM

## 2021-09-10 LAB
ALBUMIN SERPL BCP-MCNC: 3.7 G/DL (ref 3.5–5)
ALP SERPL-CCNC: 43 U/L (ref 46–116)
ALT SERPL W P-5'-P-CCNC: 30 U/L (ref 12–78)
ANION GAP SERPL CALCULATED.3IONS-SCNC: 3 MMOL/L (ref 4–13)
AST SERPL W P-5'-P-CCNC: 22 U/L (ref 5–45)
BASOPHILS # BLD AUTO: 0.04 THOUSANDS/ΜL (ref 0–0.1)
BASOPHILS NFR BLD AUTO: 1 % (ref 0–1)
BILIRUB SERPL-MCNC: 0.71 MG/DL (ref 0.2–1)
BUN SERPL-MCNC: 18 MG/DL (ref 5–25)
CALCIUM SERPL-MCNC: 8.8 MG/DL (ref 8.3–10.1)
CHLORIDE SERPL-SCNC: 109 MMOL/L (ref 100–108)
CHOLEST SERPL-MCNC: 157 MG/DL (ref 50–200)
CO2 SERPL-SCNC: 28 MMOL/L (ref 21–32)
CREAT SERPL-MCNC: 0.84 MG/DL (ref 0.6–1.3)
EOSINOPHIL # BLD AUTO: 0.17 THOUSAND/ΜL (ref 0–0.61)
EOSINOPHIL NFR BLD AUTO: 3 % (ref 0–6)
ERYTHROCYTE [DISTWIDTH] IN BLOOD BY AUTOMATED COUNT: 13.2 % (ref 11.6–15.1)
EST. AVERAGE GLUCOSE BLD GHB EST-MCNC: 114 MG/DL
GFR SERPL CREATININE-BSD FRML MDRD: 93 ML/MIN/1.73SQ M
GLUCOSE P FAST SERPL-MCNC: 102 MG/DL (ref 65–99)
HBA1C MFR BLD: 5.6 %
HCT VFR BLD AUTO: 47.7 % (ref 36.5–49.3)
HDLC SERPL-MCNC: 37 MG/DL
HGB BLD-MCNC: 15.2 G/DL (ref 12–17)
IMM GRANULOCYTES # BLD AUTO: 0.02 THOUSAND/UL (ref 0–0.2)
IMM GRANULOCYTES NFR BLD AUTO: 0 % (ref 0–2)
LDLC SERPL CALC-MCNC: 92 MG/DL (ref 0–100)
LYMPHOCYTES # BLD AUTO: 0.85 THOUSANDS/ΜL (ref 0.6–4.47)
LYMPHOCYTES NFR BLD AUTO: 14 % (ref 14–44)
MCH RBC QN AUTO: 29.4 PG (ref 26.8–34.3)
MCHC RBC AUTO-ENTMCNC: 31.9 G/DL (ref 31.4–37.4)
MCV RBC AUTO: 92 FL (ref 82–98)
MONOCYTES # BLD AUTO: 0.55 THOUSAND/ΜL (ref 0.17–1.22)
MONOCYTES NFR BLD AUTO: 9 % (ref 4–12)
NEUTROPHILS # BLD AUTO: 4.51 THOUSANDS/ΜL (ref 1.85–7.62)
NEUTS SEG NFR BLD AUTO: 73 % (ref 43–75)
NRBC BLD AUTO-RTO: 0 /100 WBCS
PLATELET # BLD AUTO: 212 THOUSANDS/UL (ref 149–390)
PMV BLD AUTO: 11.2 FL (ref 8.9–12.7)
POTASSIUM SERPL-SCNC: 3.9 MMOL/L (ref 3.5–5.3)
PROT SERPL-MCNC: 8.1 G/DL (ref 6.4–8.2)
RBC # BLD AUTO: 5.17 MILLION/UL (ref 3.88–5.62)
SODIUM SERPL-SCNC: 140 MMOL/L (ref 136–145)
TRIGL SERPL-MCNC: 139 MG/DL
TSH SERPL DL<=0.05 MIU/L-ACNC: 2.39 UIU/ML (ref 0.36–3.74)
WBC # BLD AUTO: 6.14 THOUSAND/UL (ref 4.31–10.16)

## 2021-09-10 PROCEDURE — 80061 LIPID PANEL: CPT

## 2021-09-10 PROCEDURE — 85025 COMPLETE CBC W/AUTO DIFF WBC: CPT

## 2021-09-10 PROCEDURE — 80053 COMPREHEN METABOLIC PANEL: CPT

## 2021-09-10 PROCEDURE — 84443 ASSAY THYROID STIM HORMONE: CPT

## 2021-09-10 PROCEDURE — 83036 HEMOGLOBIN GLYCOSYLATED A1C: CPT

## 2021-09-10 PROCEDURE — 36415 COLL VENOUS BLD VENIPUNCTURE: CPT

## 2021-09-29 ENCOUNTER — OFFICE VISIT (OUTPATIENT)
Dept: FAMILY MEDICINE CLINIC | Facility: CLINIC | Age: 61
End: 2021-09-29
Payer: COMMERCIAL

## 2021-09-29 VITALS
DIASTOLIC BLOOD PRESSURE: 70 MMHG | BODY MASS INDEX: 22.9 KG/M2 | OXYGEN SATURATION: 96 % | WEIGHT: 160 LBS | HEART RATE: 87 BPM | TEMPERATURE: 97.7 F | SYSTOLIC BLOOD PRESSURE: 110 MMHG | RESPIRATION RATE: 16 BRPM | HEIGHT: 70 IN

## 2021-09-29 DIAGNOSIS — L82.0 INFLAMED SEBORRHEIC KERATOSIS: Primary | ICD-10-CM

## 2021-09-29 PROBLEM — C61 PROSTATE CANCER (HCC): Status: ACTIVE | Noted: 2019-07-16

## 2021-09-29 PROCEDURE — 3008F BODY MASS INDEX DOCD: CPT | Performed by: FAMILY MEDICINE

## 2021-09-29 PROCEDURE — 17000 DESTRUCT PREMALG LESION: CPT | Performed by: FAMILY MEDICINE

## 2021-09-29 PROCEDURE — 17003 DESTRUCT PREMALG LES 2-14: CPT | Performed by: FAMILY MEDICINE

## 2021-09-29 NOTE — ASSESSMENT & PLAN NOTE
Diagnosed September 2021:  Patient had previous biopsy in 2020 which was negative  Repeat biopsy at [facility] was positive ([place] [facility])    Patient scheduled for de Sarahi prostatectomy in November 2021 at [facility]

## 2021-09-29 NOTE — PROGRESS NOTES
Lesion Destruction    Date/Time: 9/29/2021 10:48 AM  Performed by: Roz Zhao DO  Authorized by: Roz Zhao DO   Universal Protocol:  Consent: Verbal consent obtained  Consent given by: patient  Patient identity confirmed: verbally with patient      Procedure Details - Lesion Destruction:     Number of Lesions:  5  Lesion 1:     Body area:  Trunk    Trunk location:  Chest    Initial size (mm):  10    Final defect size (mm):  10    Malignancy: pre-malignant lesion      Destruction method: scissors used for extraction    Lesion 2:     Body area:  Trunk    Trunk location:  Chest    Initial size (mm):  8    Final defect size (mm):  8    Malignancy: pre-malignant lesion      Destruction method: scissors used for extraction    Lesion 3:     Body area:  Trunk    Trunk location:  Chest    Initial size (mm):  7    Final defect size (mm):  7    Malignancy: pre-malignant lesion      Destruction method: scissors used for extraction    Lesion 4:     Body area:  Trunk    Trunk location:  Back    Initial size (mm):  10    Final defect size (mm):  10    Malignancy: pre-malignant lesion      Destruction method: scissors used for extraction    Lesion 5:     Body area:  Trunk    Trunk location:  Back    Inital size (mm):  8    Final defect size (mm):  8    Malignancy: pre-malignant lesion      Destruction method: scissors used for extraction        5 pigmented lesions removed from trunk ( 3 anterior chest, 2 back)  Ranging in size from 8-10 mm diameter  AK/SK  Shave excision performed  Patient tolerated procedure well    Wound care instructions given

## 2021-10-21 ENCOUNTER — OFFICE VISIT (OUTPATIENT)
Dept: PODIATRY | Facility: CLINIC | Age: 61
End: 2021-10-21
Payer: COMMERCIAL

## 2021-10-21 VITALS
BODY MASS INDEX: 23.05 KG/M2 | HEART RATE: 56 BPM | WEIGHT: 161 LBS | SYSTOLIC BLOOD PRESSURE: 121 MMHG | DIASTOLIC BLOOD PRESSURE: 79 MMHG | HEIGHT: 70 IN

## 2021-10-21 DIAGNOSIS — L60.0 INGROWN TOENAIL: Primary | ICD-10-CM

## 2021-10-21 DIAGNOSIS — M79.674 PAIN IN TOE OF RIGHT FOOT: ICD-10-CM

## 2021-10-21 PROCEDURE — 1036F TOBACCO NON-USER: CPT | Performed by: PODIATRIST

## 2021-10-21 PROCEDURE — 3008F BODY MASS INDEX DOCD: CPT | Performed by: PODIATRIST

## 2021-10-21 PROCEDURE — 99242 OFF/OP CONSLTJ NEW/EST SF 20: CPT | Performed by: PODIATRIST

## 2021-10-21 RX ORDER — TAMSULOSIN HYDROCHLORIDE 0.4 MG/1
CAPSULE ORAL
COMMUNITY
Start: 2021-09-27 | End: 2022-03-09

## 2021-10-21 RX ORDER — MIRABEGRON 50 MG/1
TABLET, FILM COATED, EXTENDED RELEASE ORAL
COMMUNITY
Start: 2021-10-08 | End: 2022-03-09

## 2021-11-09 ENCOUNTER — CLINICAL SUPPORT (OUTPATIENT)
Dept: FAMILY MEDICINE CLINIC | Facility: CLINIC | Age: 61
End: 2021-11-09
Payer: COMMERCIAL

## 2021-11-09 DIAGNOSIS — Z23 NEED FOR VACCINATION: Primary | ICD-10-CM

## 2021-11-09 PROCEDURE — 90471 IMMUNIZATION ADMIN: CPT | Performed by: FAMILY MEDICINE

## 2021-11-09 PROCEDURE — 90682 RIV4 VACC RECOMBINANT DNA IM: CPT | Performed by: FAMILY MEDICINE

## 2022-03-02 ENCOUNTER — RA CDI HCC (OUTPATIENT)
Dept: OTHER | Facility: HOSPITAL | Age: 62
End: 2022-03-02

## 2022-03-02 NOTE — PROGRESS NOTES
[facility]  coding opportunities       Chart reviewed, no opportunity found: CHART REVIEWED, NO OPPORTUNITY FOUND            Patients insurance company: [employer] (Medicare Advantage and Commercial)

## 2022-03-08 ENCOUNTER — TELEPHONE (OUTPATIENT)
Dept: GASTROENTEROLOGY | Facility: MEDICAL CENTER | Age: 62
End: 2022-03-08

## 2022-03-08 NOTE — TELEPHONE ENCOUNTER
Pt called to schedule 5 year f/u colonoscopy  I did not see any recalls placed or any notes in his chart indicating a follow up  Just wanted to confirm before scheduling pt, thanks!

## 2022-03-09 ENCOUNTER — OFFICE VISIT (OUTPATIENT)
Dept: FAMILY MEDICINE CLINIC | Facility: CLINIC | Age: 62
End: 2022-03-09
Payer: COMMERCIAL

## 2022-03-09 VITALS
HEART RATE: 70 BPM | RESPIRATION RATE: 16 BRPM | OXYGEN SATURATION: 98 % | WEIGHT: 160 LBS | SYSTOLIC BLOOD PRESSURE: 118 MMHG | BODY MASS INDEX: 22.9 KG/M2 | DIASTOLIC BLOOD PRESSURE: 74 MMHG | HEIGHT: 70 IN | TEMPERATURE: 97.5 F

## 2022-03-09 DIAGNOSIS — C79.11 SECONDARY MALIGNANT NEOPLASM OF BLADDER (HCC): ICD-10-CM

## 2022-03-09 DIAGNOSIS — E78.2 MIXED HYPERLIPIDEMIA: ICD-10-CM

## 2022-03-09 DIAGNOSIS — C61 PROSTATE CANCER (HCC): Primary | ICD-10-CM

## 2022-03-09 DIAGNOSIS — R73.03 PREDIABETES: ICD-10-CM

## 2022-03-09 DIAGNOSIS — N52.9 ERECTILE DYSFUNCTION, UNSPECIFIED ERECTILE DYSFUNCTION TYPE: ICD-10-CM

## 2022-03-09 DIAGNOSIS — Z23 NEED FOR VACCINATION: ICD-10-CM

## 2022-03-09 PROCEDURE — 99214 OFFICE O/P EST MOD 30 MIN: CPT | Performed by: FAMILY MEDICINE

## 2022-03-09 PROCEDURE — 3008F BODY MASS INDEX DOCD: CPT | Performed by: FAMILY MEDICINE

## 2022-03-09 PROCEDURE — 90471 IMMUNIZATION ADMIN: CPT | Performed by: FAMILY MEDICINE

## 2022-03-09 PROCEDURE — 1036F TOBACCO NON-USER: CPT | Performed by: FAMILY MEDICINE

## 2022-03-09 PROCEDURE — 90715 TDAP VACCINE 7 YRS/> IM: CPT | Performed by: FAMILY MEDICINE

## 2022-03-09 RX ORDER — ROSUVASTATIN CALCIUM 10 MG/1
10 TABLET, COATED ORAL DAILY
Qty: 90 TABLET | Refills: 1 | Status: SHIPPED | OUTPATIENT
Start: 2022-03-09

## 2022-03-09 NOTE — ASSESSMENT & PLAN NOTE
Initially diagnosed September 2021  Status post robotic prostatectomy November 2021 at [facility]  During surgery, patient was found to have some tumor involvement of bladder neck  This was excised  Overall doing well  No incontinence  Some mild ED symptoms  Patient is on Cialis 5 mg daily    Continue with urologist at [facility] and PSA surveillance every 3 months for now

## 2022-03-09 NOTE — ASSESSMENT & PLAN NOTE
Cholesterol from September 2021 was 157, LDL 92  Doing well on rosuvastatin 10 mg daily  Orders placed for repeat lipid panel    Will call with results

## 2022-03-09 NOTE — PROGRESS NOTES
[facility]      NAME: Jovany Degree  AGE: 57 y o  SEX: male  : 1960   MRN: [id number]    DATE: 3/9/2022  TIME: 5:24 PM    Assessment and Plan     Problem List Items Addressed This Visit     Erectile dysfunction     Stable on Cialis 5 mg daily  Hyperlipidemia     Cholesterol from 2021 was 157, LDL 92  Doing well on rosuvastatin 10 mg daily  Orders placed for repeat lipid panel  Will call with results         Relevant Medications    rosuvastatin (CRESTOR) 10 MG tablet    Other Relevant Orders    Lipid Panel with Direct LDL reflex    Prediabetes     A1c from 2021 was 5 6%  Will repeat A1c in near future  Will call with results  Continue diet exercise         Relevant Orders    Comprehensive metabolic panel    Hemoglobin A1c (w/out EAG)    Prostate cancer [facility]) - Primary     Initially diagnosed 2021  Status post robotic prostatectomy 2021 at [facility]  During surgery, patient was found to have some tumor involvement of bladder neck  This was excised  Overall doing well  No incontinence  Some mild ED symptoms  Patient is on Cialis 5 mg daily  Continue with urologist at \[facility]\"" and PSA surveillance every 3 months for now         Secondary malignant neoplasm of bladder ([facility] [address] )      Other Visit Diagnoses     Need for vaccination        Relevant Orders    TDAP VACCINE GREATER THAN OR EQUAL TO 8YO IM (Completed)      Patient had COVID vaccination, and booster  Adacel booster given today      Return to office in:  6 months (will give 1st Shingrix at that time)    Chief Complaint     Chief Complaint   Patient presents with    Follow-up     6 months       History of Present Illness     Patient presents for 6 month med check appointment today  Status post robotic prostatectomy 2021  Overall is doing well    Compliant with prescribed medications       The following portions of the patient's history were reviewed and updated as appropriate: allergies, current medications, past family history, past medical history, past social history, past surgical history and problem list     Review of Systems   Review of Systems   Respiratory: Negative  Cardiovascular: Negative  Gastrointestinal: Negative  Genitourinary: Negative  Active Problem List     Patient Active Problem List   Diagnosis    BPH with obstruction/lower urinary tract symptoms    Erectile dysfunction    History of kidney stones    Anxiety    Hyperlipidemia    Prediabetes    Allergic rhinitis due to pollen    Prostate cancer ([address] )    Inflamed seborrheic keratosis    Secondary malignant neoplasm of bladder (HCC)       Objective   /74 (BP Location: Left arm, Patient Position: Sitting, Cuff Size: Adult)   Pulse 70   Temp 97 5 °F (36 4 °C) (Temporal)   Resp 16   Ht 5' 9 69" (1 77 m)   Wt 72 6 kg (160 lb)   SpO2 98%   BMI 23 17 kg/m²     Physical Exam  Cardiovascular:      Rate and Rhythm: Normal rate and regular rhythm  Heart sounds: Normal heart sounds  Comments: Carotids: no bruits  Ext: no edema  Pulmonary:      Effort: Pulmonary effort is normal  No respiratory distress  Breath sounds: No wheezing or rales  Psychiatric:         Behavior: Behavior normal          Thought Content:  Thought content normal          Pertinent Laboratory/Diagnostic Studies:  Urology consultation notes reviewed    Current Medications     Current Outpatient Medications:     ascorbic acid (VITAMIN C) 500 mg tablet, Take 500 mg by mouth daily, Disp: , Rfl:     Cetirizine HCl (ZYRTEC ALLERGY) 10 MG CAPS, Take by mouth daily as needed, Disp: , Rfl:     DAILY MULTIPLE VITAMINS/IRON PO, Take 1 tablet by mouth daily, Disp: , Rfl:     rosuvastatin (CRESTOR) 10 MG tablet, Take 1 tablet (10 mg total) by mouth daily, Disp: 90 tablet, Rfl: 1    tadalafil (CIALIS) 5 MG tablet, 1 tab daily, Disp: 90 tablet, Rfl: 0    Health Maintenance     Health Maintenance Topic Date Due    Hepatitis C Screening  Never done    Pneumococcal Vaccine: Pediatrics (0 to 5 Years) and At-Risk Patients (6 to 59 Years) (1 of 4 - PCV13) Never done    HIV Screening  Never done    Colorectal Cancer Screening  01/10/2022    Depression Screening  09/08/2022    COVID-19 Vaccine (4 - Booster for Ariana Lunsford series) 03/25/2022    Annual Physical  09/08/2022    BMI: Adult  03/09/2023    DTaP,Tdap,and Td Vaccines (2 - Td or Tdap) 03/09/2032    Influenza Vaccine  Completed    HIB Vaccine  Aged Out    Hepatitis B Vaccine  Aged Out    IPV Vaccine  Aged Out    Hepatitis A Vaccine  Aged Out    Meningococcal ACWY Vaccine  Aged Out    HPV Vaccine  Aged Out     Immunization History   Administered Date(s) Administered    COVID-19 MODERNA VACC 0 5 ML IM 03/27/2021, 04/24/2021, 10/25/2021    Hep A / Hep B 03/06/2003, 03/06/2003, 05/23/2003, 05/23/2003, 11/10/2003, 11/10/2003    Influenza Quadrivalent, 6-35 Months IM 12/29/2015, 08/21/2017    Influenza, recombinant, quadrivalent,injectable, preservative free 09/05/2018, 01/16/2020, 11/09/2021    Influenza, seasonal, injectable 10/25/2013    Tdap 03/09/2022       Glen Rizzo DO  [facility]

## 2022-03-09 NOTE — ASSESSMENT & PLAN NOTE
A1c from September 2021 was 5 6%  Will repeat A1c in near future  Will call with results    Continue diet exercise

## 2022-03-21 LAB
ALBUMIN SERPL-MCNC: 4.2 G/DL (ref 3.6–5.1)
ALBUMIN/GLOB SERPL: 1.4 (CALC) (ref 1–2.5)
ALP SERPL-CCNC: 35 U/L (ref 35–144)
ALT SERPL-CCNC: 21 U/L (ref 9–46)
AST SERPL-CCNC: 21 U/L (ref 10–35)
BILIRUB SERPL-MCNC: 0.5 MG/DL (ref 0.2–1.2)
BUN SERPL-MCNC: 21 MG/DL (ref 7–25)
BUN/CREAT SERPL: ABNORMAL (CALC) (ref 6–22)
CALCIUM SERPL-MCNC: 9.4 MG/DL (ref 8.6–10.3)
CHLORIDE SERPL-SCNC: 105 MMOL/L (ref 98–110)
CHOLEST SERPL-MCNC: 167 MG/DL
CHOLEST/HDLC SERPL: 4.4 (CALC)
CO2 SERPL-SCNC: 29 MMOL/L (ref 20–32)
CREAT SERPL-MCNC: 0.95 MG/DL (ref 0.7–1.25)
GLOBULIN SER CALC-MCNC: 3 G/DL (CALC) (ref 1.9–3.7)
GLUCOSE SERPL-MCNC: 103 MG/DL (ref 65–99)
HBA1C MFR BLD: 5.5 % OF TOTAL HGB
HDLC SERPL-MCNC: 38 MG/DL
LDLC SERPL CALC-MCNC: 106 MG/DL (CALC)
NONHDLC SERPL-MCNC: 129 MG/DL (CALC)
POTASSIUM SERPL-SCNC: 4 MMOL/L (ref 3.5–5.3)
PROT SERPL-MCNC: 7.2 G/DL (ref 6.1–8.1)
SL AMB EGFR AFRICAN AMERICAN: 98 ML/MIN/1.73M2
SL AMB EGFR NON AFRICAN AMERICAN: 84 ML/MIN/1.73M2
SODIUM SERPL-SCNC: 139 MMOL/L (ref 135–146)
TRIGL SERPL-MCNC: 136 MG/DL

## 2022-03-22 DIAGNOSIS — Z13.0 SCREENING FOR DEFICIENCY ANEMIA: ICD-10-CM

## 2022-03-22 DIAGNOSIS — R73.03 PREDIABETES: ICD-10-CM

## 2022-03-22 DIAGNOSIS — E78.2 MIXED HYPERLIPIDEMIA: Primary | ICD-10-CM

## 2022-03-22 DIAGNOSIS — Z13.29 SCREENING FOR THYROID DISORDER: ICD-10-CM

## 2022-04-28 ENCOUNTER — OFFICE VISIT (OUTPATIENT)
Dept: URGENT CARE | Facility: MEDICAL CENTER | Age: 62
End: 2022-04-28
Payer: COMMERCIAL

## 2022-04-28 VITALS
TEMPERATURE: 97.9 F | OXYGEN SATURATION: 97 % | SYSTOLIC BLOOD PRESSURE: 110 MMHG | RESPIRATION RATE: 18 BRPM | DIASTOLIC BLOOD PRESSURE: 70 MMHG | HEART RATE: 91 BPM

## 2022-04-28 DIAGNOSIS — R05.9 COUGH: Primary | ICD-10-CM

## 2022-04-28 DIAGNOSIS — J06.9 ACUTE URI: ICD-10-CM

## 2022-04-28 PROCEDURE — G0382 LEV 3 HOSP TYPE B ED VISIT: HCPCS | Performed by: FAMILY MEDICINE

## 2022-04-28 PROCEDURE — U0003 INFECTIOUS AGENT DETECTION BY NUCLEIC ACID (DNA OR RNA); SEVERE ACUTE RESPIRATORY SYNDROME CORONAVIRUS 2 (SARS-COV-2) (CORONAVIRUS DISEASE [COVID-19]), AMPLIFIED PROBE TECHNIQUE, MAKING USE OF HIGH THROUGHPUT TECHNOLOGIES AS DESCRIBED BY CMS-2020-01-R: HCPCS | Performed by: FAMILY MEDICINE

## 2022-04-28 PROCEDURE — U0005 INFEC AGEN DETEC AMPLI PROBE: HCPCS | Performed by: FAMILY MEDICINE

## 2022-04-28 PROCEDURE — S9083 URGENT CARE CENTER GLOBAL: HCPCS | Performed by: FAMILY MEDICINE

## 2022-04-28 RX ORDER — FLUTICASONE PROPIONATE 50 MCG
2 SPRAY, SUSPENSION (ML) NASAL DAILY
Qty: 16 G | Refills: 0 | Status: SHIPPED | OUTPATIENT
Start: 2022-04-28

## 2022-04-28 RX ORDER — BENZONATATE 100 MG/1
100 CAPSULE ORAL 3 TIMES DAILY PRN
Qty: 20 CAPSULE | Refills: 0 | Status: SHIPPED | OUTPATIENT
Start: 2022-04-28 | End: 2022-06-21

## 2022-04-28 NOTE — PROGRESS NOTES
[address] [facility] [employer] [facility]        NAME: Piper Martinez is a 57 y o  male  : 1960    MRN: [id number]  DATE: 2022  TIME: 8:51 AM    Assessment and Plan   Cough [R05 9]  1  Cough  COVID Only -Office Collect   2  Acute URI  benzonatate (TESSALON PERLES) 100 mg capsule    fluticasone (FLONASE) 50 mcg/act nasal spray         Patient Instructions       Follow up with PCP in 3-5 days  Proceed to  ER if symptoms worsen  Chief Complaint     Chief Complaint   Patient presents with   Rose Sang Like Symptoms     Patient relates has been sick since Saturday with congestion, scratchy throat and ears feel like fluid inside" Denies fever  Denies chest pain and SOB  + head congestion  States he was in [place] last week and friend he was with tested covid positive on   Patient did a home test on Monday and negative  History of Present Illness       57-year-old male here today with URI symptoms for the last 5 days  Complaining nasal congestion fullness pressure in the ears  Postnasal drip scratchy throat  Nonproductive cough  However, denies shortness of breast wheeze  Denies fever  Complaining some headache and mild body aches  He expresses concern because he returned from [place] where he was there for approximately 2 weeks returning on  but symptoms not present until   He expresses concern because he was around 2 friend, 1 0 which tested positive for COVID around    Patient did a at home COVID test 4 days ago which was negative  Only medication he is taking so for has been Zyrtec once with no significant improvement      Review of Systems   Review of Systems   Constitutional: Negative  HENT: Positive for congestion, ear pain, postnasal drip, sinus pressure and sore throat  Respiratory: Positive for cough  Gastrointestinal: Negative  Neurological: Positive for headaches           Current Medications       Current Outpatient Medications:     ascorbic acid (VITAMIN C) 500 mg tablet, Take 500 mg by mouth daily, Disp: , Rfl:     Cetirizine HCl (ZYRTEC ALLERGY) 10 MG CAPS, Take by mouth daily as needed, Disp: , Rfl:     DAILY MULTIPLE VITAMINS/IRON PO, Take 1 tablet by mouth daily, Disp: , Rfl:     rosuvastatin (CRESTOR) 10 MG tablet, Take 1 tablet (10 mg total) by mouth daily, Disp: 90 tablet, Rfl: 1    tadalafil (CIALIS) 5 MG tablet, 1 tab daily, Disp: 90 tablet, Rfl: 0    benzonatate (TESSALON PERLES) 100 mg capsule, Take 1 capsule (100 mg total) by mouth 3 (three) times a day as needed for cough, Disp: 20 capsule, Rfl: 0    fluticasone (FLONASE) 50 mcg/act nasal spray, 2 sprays into each nostril daily, Disp: 16 g, Rfl: 0    Current Allergies     Allergies as of 04/28/2022 - Reviewed 04/28/2022   Allergen Reaction Noted    Other  01/09/2017    Pollen extract  01/09/2017            The following portions of the patient's history were reviewed and updated as appropriate: allergies, current medications, past family history, past medical history, past social history, past surgical history and problem list      Past Medical History:   Diagnosis Date    Abnormal blood chemistry     Allergic rhinitis     last assessed 08/22/2014    Anxiety     Elevated PSA     Erectile dysfunction of non-organic origin     Hyperglycemia     Last Assessed: 1/20/2016     Hyperlipidemia     Kidney stone     Paresthesia of foot     last assessed 08/14/2015       Past Surgical History:   Procedure Laterality Date    FOOT SURGERY      HAND SURGERY Left     HERNIA REPAIR      INGUINAL HERNIA REPAIR      PA COLONOSCOPY FLX DX W/COLLJ SPEC WHEN PFRMD N/A 1/10/2017    Procedure: COLONOSCOPY;  Surgeon: Lois Babin MD;  Location: [facility] GI LAB;   Service: Gastroenterology    PA CYSTO/URETERO W/LITHOTRIPSY &INDWELL STENT INSRT Left 2/1/2018    Procedure: CYSTOSCOPY, LEFT URETEROSCOPY, RETROGRADE PYELOGRAM;  Surgeon: Luis E Beckman MD;  Location: [facility];  Service: Urology    PROSTATE BIOPSY  12/27/2019       Family History   Problem Relation Age of Onset    Lung cancer Mother     Lung cancer Father          Medications have been verified  Objective   /70   Pulse 91   Temp 97 9 °F (36 6 °C) (Tympanic)   Resp 18   SpO2 97%   No LMP for male patient  Physical Exam     Physical Exam  Vitals and nursing note reviewed  Constitutional:       Appearance: Normal appearance  HENT:      Right Ear: Tympanic membrane normal       Left Ear: Tympanic membrane normal       Nose:      Comments: Hypertrophic boggy left turbinates clear nasal discharge  Mouth/Throat:      Mouth: Mucous membranes are moist       Comments: Cobblestoning observed in the posterior pharynx  Cardiovascular:      Rate and Rhythm: Normal rate  Pulses: Normal pulses  Pulmonary:      Effort: Pulmonary effort is normal    Musculoskeletal:      Cervical back: Normal range of motion and neck supple  Neurological:      Mental Status: He is alert

## 2022-04-28 NOTE — PATIENT INSTRUCTIONS
COVID test performed  Patient started on fluticasone nasal spray and Tessalon Perles for cough  Advised patient increase fluid intake, take Tylenol Motrin in the for pain or fever if present  Cold Symptoms, Ambulatory Care   GENERAL INFORMATION:   Cold symptoms  include sneezing, dry throat, a stuffy nose, headache, watery eyes, and a cough  Your cough may be dry, or you may cough up mucus  You may also have muscle aches, joint pain, and tiredness  Rarely, you may have a fever  Cold symptoms occur from inflammation in your upper respiratory system caused by a virus  Most colds go away without treatment  Seek immediate care for the following symptoms:   · A heartbeat that is much faster than usual for you     · A swollen neck that is sore to the touch     · Increased tiredness and weakness    · Pinpoint or larger reddish-purple dots on your skin     · Poor or no appetite  Treatment for cold symptoms  may include NSAIDS to decrease muscle aches and fever  Do not give NSAID medicines to children under 10months of age without direction from your child's doctor  Cold medicines may also be given to decrease coughing, nasal stuffiness, sneezing, and a runny nose  Do not give cold medicines to children under 11years of age without direction from your child's doctor  Manage your cold symptoms with the following:   · Drink liquids  to help thin and loosen thick mucus so you can cough it up  Liquids will also keep you hydrated  Ask your healthcare provider which liquids are best for you and how much to drink each day  · Do not smoke  because it may worsen your symptoms and increase the length of time you feel sick  Talk with your healthcare provider if you need help to stop smoking  Prevent the spread of germs  by washing your hands often  You can spread your cold germs to others for at least 3 days after your symptoms start  Do not share items, such as eating utensils   Cover your nose and mouth when you cough or sneeze using the crook of your elbow instead of your hands  Throw used tissues in the garbage  Follow up with your healthcare provider as directed:  Write down your questions so you remember to ask them during your visits  CARE AGREEMENT:   You have the right to help plan your care  Learn about your health condition and how it may be treated  Discuss treatment options with your caregivers to decide what care you want to receive  You always have the right to refuse treatment  The above information is an  only  It is not intended as medical advice for individual conditions or treatments  Talk to your doctor, nurse or pharmacist before following any medical regimen to see if it is safe and effective for you  © 2014 [address] is for End User's use only and may not be sold, redistributed or otherwise used for commercial purposes  All illustrations and images included in CareNotes® are the copyrighted property of A D A M , Inc  or Aris Marinelli

## 2022-04-29 LAB — SARS-COV-2 RNA RESP QL NAA+PROBE: NEGATIVE

## 2022-05-16 ENCOUNTER — ANESTHESIA (OUTPATIENT)
Dept: GASTROENTEROLOGY | Facility: MEDICAL CENTER | Age: 62
End: 2022-05-16

## 2022-05-16 ENCOUNTER — HOSPITAL ENCOUNTER (OUTPATIENT)
Dept: GASTROENTEROLOGY | Facility: MEDICAL CENTER | Age: 62
Setting detail: OUTPATIENT SURGERY
Discharge: HOME | End: 2022-05-16
Attending: INTERNAL MEDICINE
Payer: COMMERCIAL

## 2022-05-16 ENCOUNTER — ANESTHESIA EVENT (OUTPATIENT)
Dept: GASTROENTEROLOGY | Facility: MEDICAL CENTER | Age: 62
End: 2022-05-16

## 2022-05-16 VITALS
HEART RATE: 70 BPM | WEIGHT: 160 LBS | RESPIRATION RATE: 13 BRPM | HEIGHT: 70 IN | BODY MASS INDEX: 22.9 KG/M2 | TEMPERATURE: 98.2 F | DIASTOLIC BLOOD PRESSURE: 63 MMHG | OXYGEN SATURATION: 97 % | SYSTOLIC BLOOD PRESSURE: 104 MMHG

## 2022-05-16 DIAGNOSIS — Z12.11 COLON CANCER SCREENING: ICD-10-CM

## 2022-05-16 PROCEDURE — 88305 TISSUE EXAM BY PATHOLOGIST: CPT | Performed by: PATHOLOGY

## 2022-05-16 PROCEDURE — 45385 COLONOSCOPY W/LESION REMOVAL: CPT | Performed by: INTERNAL MEDICINE

## 2022-05-16 RX ORDER — LIDOCAINE HYDROCHLORIDE 20 MG/ML
INJECTION, SOLUTION EPIDURAL; INFILTRATION; INTRACAUDAL; PERINEURAL AS NEEDED
Status: DISCONTINUED | OUTPATIENT
Start: 2022-05-16 | End: 2022-05-16

## 2022-05-16 RX ORDER — PROPOFOL 10 MG/ML
INJECTION, EMULSION INTRAVENOUS AS NEEDED
Status: DISCONTINUED | OUTPATIENT
Start: 2022-05-16 | End: 2022-05-16

## 2022-05-16 RX ORDER — SODIUM CHLORIDE 9 MG/ML
125 INJECTION, SOLUTION INTRAVENOUS CONTINUOUS
Status: DISCONTINUED | OUTPATIENT
Start: 2022-05-16 | End: 2022-05-20 | Stop reason: HOSPADM

## 2022-05-16 RX ADMIN — LIDOCAINE HYDROCHLORIDE 5 ML: 20 INJECTION, SOLUTION EPIDURAL; INFILTRATION; INTRACAUDAL; PERINEURAL at 09:27

## 2022-05-16 RX ADMIN — PROPOFOL 50 MG: 10 INJECTION, EMULSION INTRAVENOUS at 09:35

## 2022-05-16 RX ADMIN — PROPOFOL 120 MG: 10 INJECTION, EMULSION INTRAVENOUS at 09:27

## 2022-05-16 RX ADMIN — PROPOFOL 50 MG: 10 INJECTION, EMULSION INTRAVENOUS at 09:41

## 2022-05-16 RX ADMIN — SODIUM CHLORIDE 125 ML/HR: 0.9 INJECTION, SOLUTION INTRAVENOUS at 08:55

## 2022-05-16 NOTE — ANESTHESIA PREPROCEDURE EVALUATION
Procedure:  COLONOSCOPY    Relevant Problems   CARDIO   (+) Hyperlipidemia      /RENAL   (+) BPH with obstruction/lower urinary tract symptoms   (+) Prostate cancer (HCC)      NEURO/PSYCH   (+) Anxiety   (+) History of kidney stones      Genitourinary   (+) Secondary malignant neoplasm of bladder (HCC)        Physical Exam    Airway    Mallampati score: II  TM Distance: >3 FB  Neck ROM: full     Dental   No notable dental hx     Cardiovascular  Cardiovascular exam normal    Pulmonary  Pulmonary exam normal     Other Findings        Anesthesia Plan  ASA Score- 2     Anesthesia Type- IV sedation with anesthesia with ASA Monitors  Additional Monitors:   Airway Plan:           Plan Factors-Exercise tolerance (METS): >4 METS  Chart reviewed  Patient is not a current smoker  Patient instructed to abstain from smoking on day of procedure  Patient did not smoke on day of surgery  Obstructive sleep apnea risk education given perioperatively  Induction- intravenous  Postoperative Plan-     Informed Consent- Anesthetic plan and risks discussed with patient

## 2022-05-16 NOTE — H&P
History and Physical - [facility]  Rubina Mckenzie 57 y o  male MRN: [id number]    HPI: Rubina Mckenzie is a 57y o  year old male who presents with h/o colon polyp in 2017  Review of Systems    Historical Information   Past Medical History:   Diagnosis Date    Abnormal blood chemistry     Allergic rhinitis     last assessed 08/22/2014    Anxiety     Elevated PSA     Erectile dysfunction of non-organic origin     Hyperglycemia     Last Assessed: 1/20/2016     Hyperlipidemia     Kidney stone     Paresthesia of foot     last assessed 08/14/2015     Past Surgical History:   Procedure Laterality Date    COLONOSCOPY      FOOT SURGERY      HAND SURGERY Left     HERNIA REPAIR      INGUINAL HERNIA REPAIR      ME COLONOSCOPY FLX DX W/COLLJ SPEC WHEN PFRMD N/A 01/10/2017    Procedure: COLONOSCOPY;  Surgeon: Karli Leung MD;  Location: [facility] GI LAB;   Service: Gastroenterology    ME CYSTO/URETERO W/LITHOTRIPSY &INDWELL STENT INSRT Left 02/01/2018    Procedure: CYSTOSCOPY, LEFT URETEROSCOPY, RETROGRADE PYELOGRAM;  Surgeon: Francesco Read MD;  Location: [facility];  Service: Urology    PROSTATE BIOPSY  12/27/2019     Social History   Social History     Substance and Sexual Activity   Alcohol Use Yes    Comment: occasional/social alcohol use      Social History     Substance and Sexual Activity   Drug Use No     Social History     Tobacco Use   Smoking Status Never Smoker   Smokeless Tobacco Never Used     Family History   Problem Relation Age of Onset    Lung cancer Mother     Lung cancer Father        Meds/Allergies     (Not in a hospital admission)      Allergies   Allergen Reactions    Other      trees    Pollen Extract        Objective     /69   Pulse 70   Temp 98 2 °F (36 8 °C) (Temporal)   Resp 18   Ht 5' 10" (1 778 m)   Wt 72 6 kg (160 lb)   SpO2 95%   BMI 22 96 kg/m²       PHYSICAL EXAM    Gen: NAD  CV: RRR  CHEST: Clear  ABD: soft, NT/ND  EXT: no edema  Neuro: AAO      ASSESSMENT/PLAN:  This is a 57y o  year old male here for h/o colon polyp in 2017 - colon polyp surveillance  PLAN:   Procedure: colonoscopy

## 2022-05-16 NOTE — ANESTHESIA POSTPROCEDURE EVALUATION
Post-Op Assessment Note    CV Status:  Stable    Pain management: adequate     Mental Status:  Alert and awake   Hydration Status:  Euvolemic   PONV Controlled:  Controlled   Airway Patency:  Patent      Post Op Vitals Reviewed: Yes            No complications documented      BP      Temp      Pulse     Resp      SpO2      /63   Pulse 70   Temp 98 2 °F (36 8 °C) (Temporal)   Resp 13   Ht 5' 10" (1 778 m)   Wt 72 6 kg (160 lb)   SpO2 97%   BMI 22 96 kg/m²

## 2022-05-28 NOTE — RESULT ENCOUNTER NOTE
Inform patient via Writtenhart  Please review the pathology/lab result of further discussion  Copied from [address] message :       Tammy Valentin,     One of the colon polyps was precancerous  I would repeat the colonoscopy in 5 - 7 years  Other polyps were totally benign       Best regards,     Dion Regalado MD

## 2022-06-07 ENCOUNTER — HOSPITAL ENCOUNTER (OUTPATIENT)
Dept: RADIOLOGY | Age: 62
Discharge: HOME | End: 2022-06-07
Payer: COMMERCIAL

## 2022-06-07 DIAGNOSIS — N50.819 TESTICULAR PAIN, UNSPECIFIED: ICD-10-CM

## 2022-06-07 PROCEDURE — 76870 US EXAM SCROTUM: CPT

## 2022-06-21 ENCOUNTER — TELEPHONE (OUTPATIENT)
Dept: FAMILY MEDICINE CLINIC | Facility: CLINIC | Age: 62
End: 2022-06-21

## 2022-06-21 NOTE — TELEPHONE ENCOUNTER
Pt presents with questions  Back injury at work 6/5  Referred to work comp doctor  Was diagnosed with possible sciatic  Was given Naproxen and referred to chiropractor  Has had 5 visits  No relief from Chiropractic care or Naproxen  Chiropractor told him that he likely has a herniated disc and will need an MRI  Was given muscle relaxer  Still no relief  It was suggested he f/u with PCP for Cymbalta for his pain and anxiety  We briefly discussed and pt declines at this time, stating he does not have baseline anxiety and does not wish to start a mental health medication for pain  States he was offered Gabapentin through the work comp doc and will likely try that    He will follow back up with work comp provider tomorrow

## 2022-08-19 DIAGNOSIS — E78.2 MIXED HYPERLIPIDEMIA: ICD-10-CM

## 2022-08-19 RX ORDER — ROSUVASTATIN CALCIUM 10 MG/1
TABLET, COATED ORAL
Qty: 90 TABLET | Refills: 2 | Status: SHIPPED | OUTPATIENT
Start: 2022-08-19

## 2022-09-12 ENCOUNTER — OFFICE VISIT (OUTPATIENT)
Dept: FAMILY MEDICINE CLINIC | Facility: CLINIC | Age: 62
End: 2022-09-12
Payer: COMMERCIAL

## 2022-09-12 VITALS
RESPIRATION RATE: 16 BRPM | DIASTOLIC BLOOD PRESSURE: 76 MMHG | TEMPERATURE: 97.7 F | OXYGEN SATURATION: 97 % | WEIGHT: 160 LBS | SYSTOLIC BLOOD PRESSURE: 110 MMHG | HEIGHT: 70 IN | HEART RATE: 76 BPM | BODY MASS INDEX: 22.9 KG/M2

## 2022-09-12 DIAGNOSIS — M48.062 SPINAL STENOSIS OF LUMBAR REGION WITH NEUROGENIC CLAUDICATION: ICD-10-CM

## 2022-09-12 DIAGNOSIS — R73.03 PREDIABETES: ICD-10-CM

## 2022-09-12 DIAGNOSIS — Z13.29 SCREENING FOR THYROID DISORDER: ICD-10-CM

## 2022-09-12 DIAGNOSIS — E78.2 MIXED HYPERLIPIDEMIA: Primary | ICD-10-CM

## 2022-09-12 DIAGNOSIS — N52.9 ERECTILE DYSFUNCTION, UNSPECIFIED ERECTILE DYSFUNCTION TYPE: ICD-10-CM

## 2022-09-12 DIAGNOSIS — C61 PROSTATE CANCER (HCC): ICD-10-CM

## 2022-09-12 DIAGNOSIS — Z13.0 SCREENING FOR DEFICIENCY ANEMIA: ICD-10-CM

## 2022-09-12 PROCEDURE — 99214 OFFICE O/P EST MOD 30 MIN: CPT | Performed by: FAMILY MEDICINE

## 2022-09-12 NOTE — PROGRESS NOTES
[facility]      NAME: Dayanara Putnam  AGE: 57 y o  SEX: male  : 1960   MRN: [id number]    DATE: 2022  TIME: 5:58 PM    Assessment and Plan     Problem List Items Addressed This Visit     Erectile dysfunction     Stable on Cialis 5 mg daily         Hyperlipidemia - Primary     Doing well on rosuvastatin 10 mg daily  Continue diet exercise  Patient is due for labs in near future  Will call with results         Relevant Orders    Lipid Panel with Direct LDL reflex    Prediabetes     History of mildly elevated blood sugars  Most recently was 5 6%  Continue diet exercise  Patient is due for labs  Will call with results         Relevant Orders    Comprehensive metabolic panel    Hemoglobin A1c (w/out EAG)    Prostate cancer ([facility] [address] )     History of prostate cancer, diagnosed in 2021  Status post robotic prostatectomy 2021 at [facility]  During surgery, patient was found to have micro invasion of the bladder neck  This was excised  Since that time he has been doing well  No incontinence but some ED symptoms  Doing well on Cialis 5 mg daily  Most recent PSA from  was undetectable  Recommend continue follow-up with urologist as directed           Spinal stenosis of lumbar region with neurogenic claudication     Patient complaining of ongoing low back pain radiating into right lower extremity  He has completed physical therapy and gone for Neurosurgery consult  He would like to defer surgery if possible  He was instructed to rest   Follow-up as directed           Other Visit Diagnoses     Screening for deficiency anemia        Relevant Orders    CBC and differential    Screening for thyroid disorder        Relevant Orders    TSH, 3rd generation with Free T4 reflex      Patient current with colon cancer screening  Patient COVID vaccination x4  Most recent tetanus booster   Again discussed Shingrix    Patient states he will most likely get vaccine    Pt is due for labs ([employer])  Will call w/ results    Return to office in:  6 months, ?labs    Chief Complaint     Chief Complaint   Patient presents with    Follow-up     6 months       History of Present Illness     Patient presents recheck chronic medical problems today  He has been having ongoing problems does low back  The following portions of the patient's history were reviewed and updated as appropriate: allergies, current medications, past family history, past medical history, past social history, past surgical history and problem list     Review of Systems   Review of Systems   Respiratory: Negative  Cardiovascular: Negative  Gastrointestinal: Negative  Genitourinary: Negative  Active Problem List     Patient Active Problem List   Diagnosis    BPH with obstruction/lower urinary tract symptoms    Erectile dysfunction    History of kidney stones    Anxiety    Hyperlipidemia    Prediabetes    Allergic rhinitis due to pollen    Prostate cancer ([facility] [address] )    Inflamed seborrheic keratosis    Secondary malignant neoplasm of bladder ([facility] [address] )    Spinal stenosis of lumbar region with neurogenic claudication       Objective   /76 (BP Location: Left arm, Patient Position: Sitting, Cuff Size: Adult)   Pulse 76   Temp 97 7 °F (36 5 °C) (Temporal)   Resp 16   Ht 5' 9 69" (1 77 m)   Wt 72 6 kg (160 lb)   SpO2 97%   BMI 23 17 kg/m²     Physical Exam  Cardiovascular:      Rate and Rhythm: Normal rate and regular rhythm  Heart sounds: Normal heart sounds  Comments: Carotids: no bruits  Ext: no edema  Pulmonary:      Effort: Pulmonary effort is normal  No respiratory distress  Breath sounds: No wheezing or rales  Psychiatric:         Behavior: Behavior normal          Thought Content:  Thought content normal          Pertinent Laboratory/Diagnostic Studies:  Last labs reviewed    Current Medications     Current Outpatient Medications:     ascorbic acid (VITAMIN C) 500 mg tablet, Take 1,000 mg by mouth daily, Disp: , Rfl:     Cetirizine HCl 10 MG CAPS, Take by mouth daily as needed, Disp: , Rfl:     Coenzyme Q10 (CO Q 10 PO), Take by mouth, Disp: , Rfl:     DAILY MULTIPLE VITAMINS/IRON PO, Take 1 tablet by mouth daily, Disp: , Rfl:     fluticasone (FLONASE) 50 mcg/act nasal spray, 2 sprays into each nostril daily (Patient taking differently: 2 sprays into each nostril as needed), Disp: 16 g, Rfl: 0    naproxen sodium (ANAPROX) 550 mg tablet, Take 550 mg by mouth, Disp: , Rfl:     rosuvastatin (CRESTOR) 10 MG tablet, TAKE 1 TABLET BY MOUTH EVERY DAY, Disp: 90 tablet, Rfl: 2    tadalafil (CIALIS) 5 MG tablet, 1 tab daily, Disp: 90 tablet, Rfl: 0    TURMERIC PO, Take by mouth, Disp: , Rfl:     Health Maintenance     Health Maintenance   Topic Date Due    Hepatitis C Screening  Never done    Pneumococcal Vaccine: Pediatrics (0 to 5 Years) and At-Risk Patients (6 to 59 Years) (1 - PCV) Never done    HIV Screening  Never done    Annual Physical  09/08/2022    Influenza Vaccine (1) 09/01/2022    COVID-19 Vaccine (5 - Booster for Moderna series) 10/02/2022    Depression Screening  06/21/2023    BMI: Adult  09/12/2023    Colorectal Cancer Screening  05/15/2027    DTaP,Tdap,and Td Vaccines (2 - Td or Tdap) 03/09/2032    HIB Vaccine  Aged Out    Hepatitis B Vaccine  Aged Out    IPV Vaccine  Aged Out    Hepatitis A Vaccine  Aged Out    Meningococcal ACWY Vaccine  Aged Out    HPV Vaccine  Aged Out     Immunization History   Administered Date(s) Administered    COVID-19 MODERNA VACC 0 5 ML IM 03/27/2021, 04/24/2021, 10/25/2021, 06/02/2022    Hep A / Hep B 03/06/2003, 03/06/2003, 05/23/2003, 05/23/2003, 11/10/2003, 11/10/2003    Influenza Quadrivalent, 6-35 Months IM 12/29/2015, 08/21/2017    Influenza, recombinant, quadrivalent,injectable, preservative free 09/05/2018, 01/16/2020, 11/09/2021    Influenza, seasonal, injectable 10/25/2013    Tdap 03/09/2022 Clem Young, [facility]

## 2022-09-12 NOTE — ASSESSMENT & PLAN NOTE
History of prostate cancer, diagnosed in September 2021  Status post robotic prostatectomy November 2021 at [facility]  During surgery, patient was found to have micro invasion of the bladder neck  This was excised  Since that time he has been doing well  No incontinence but some ED symptoms  Doing well on Cialis 5 mg daily  Most recent PSA from July 25th was undetectable    Recommend continue follow-up with urologist as directed

## 2022-09-12 NOTE — ASSESSMENT & PLAN NOTE
History of mildly elevated blood sugars  Most recently was 5 6%  Continue diet exercise  Patient is due for labs    Will call with results

## 2022-09-12 NOTE — ASSESSMENT & PLAN NOTE
Doing well on rosuvastatin 10 mg daily  Continue diet exercise  Patient is due for labs in near future    Will call with results

## 2022-09-12 NOTE — ASSESSMENT & PLAN NOTE
Patient complaining of ongoing low back pain radiating into right lower extremity  He has completed physical therapy and gone for Neurosurgery consult  He would like to defer surgery if possible    He was instructed to rest   Follow-up as directed

## 2022-09-27 DIAGNOSIS — R73.03 PREDIABETES: ICD-10-CM

## 2022-09-27 DIAGNOSIS — E78.2 MIXED HYPERLIPIDEMIA: Primary | ICD-10-CM

## 2022-09-27 LAB
ALBUMIN SERPL-MCNC: 4.3 G/DL (ref 3.6–5.1)
ALBUMIN/GLOB SERPL: 1.5 (CALC) (ref 1–2.5)
ALP SERPL-CCNC: 37 U/L (ref 35–144)
ALT SERPL-CCNC: 20 U/L (ref 9–46)
AST SERPL-CCNC: 17 U/L (ref 10–35)
BASOPHILS # BLD AUTO: 50 CELLS/UL (ref 0–200)
BASOPHILS NFR BLD AUTO: 1 %
BILIRUB SERPL-MCNC: 0.6 MG/DL (ref 0.2–1.2)
BUN SERPL-MCNC: 21 MG/DL (ref 7–25)
BUN/CREAT SERPL: ABNORMAL (CALC) (ref 6–22)
CALCIUM SERPL-MCNC: 9.6 MG/DL (ref 8.6–10.3)
CHLORIDE SERPL-SCNC: 108 MMOL/L (ref 98–110)
CHOLEST SERPL-MCNC: 181 MG/DL
CHOLEST/HDLC SERPL: 4.8 (CALC)
CO2 SERPL-SCNC: 25 MMOL/L (ref 20–32)
CREAT SERPL-MCNC: 0.9 MG/DL (ref 0.7–1.35)
EOSINOPHIL # BLD AUTO: 150 CELLS/UL (ref 15–500)
EOSINOPHIL NFR BLD AUTO: 3 %
ERYTHROCYTE [DISTWIDTH] IN BLOOD BY AUTOMATED COUNT: 12.9 % (ref 11–15)
GFR/BSA.PRED SERPLBLD CYS-BASED-ARV: 95 ML/MIN/1.73M2
GLOBULIN SER CALC-MCNC: 2.9 G/DL (CALC) (ref 1.9–3.7)
GLUCOSE SERPL-MCNC: 104 MG/DL (ref 65–99)
HBA1C MFR BLD: 5.5 % OF TOTAL HGB
HCT VFR BLD AUTO: 44.4 % (ref 38.5–50)
HDLC SERPL-MCNC: 38 MG/DL
HGB BLD-MCNC: 15.1 G/DL (ref 13.2–17.1)
LDLC SERPL CALC-MCNC: 121 MG/DL (CALC)
LYMPHOCYTES # BLD AUTO: 905 CELLS/UL (ref 850–3900)
LYMPHOCYTES NFR BLD AUTO: 18.1 %
MCH RBC QN AUTO: 29.5 PG (ref 27–33)
MCHC RBC AUTO-ENTMCNC: 34 G/DL (ref 32–36)
MCV RBC AUTO: 86.7 FL (ref 80–100)
MONOCYTES # BLD AUTO: 490 CELLS/UL (ref 200–950)
MONOCYTES NFR BLD AUTO: 9.8 %
NEUTROPHILS # BLD AUTO: 3405 CELLS/UL (ref 1500–7800)
NEUTROPHILS NFR BLD AUTO: 68.1 %
NONHDLC SERPL-MCNC: 143 MG/DL (CALC)
PLATELET # BLD AUTO: 202 THOUSAND/UL (ref 140–400)
PMV BLD REES-ECKER: 11.4 FL (ref 7.5–12.5)
POTASSIUM SERPL-SCNC: 4.3 MMOL/L (ref 3.5–5.3)
PROT SERPL-MCNC: 7.2 G/DL (ref 6.1–8.1)
RBC # BLD AUTO: 5.12 MILLION/UL (ref 4.2–5.8)
SODIUM SERPL-SCNC: 140 MMOL/L (ref 135–146)
TRIGL SERPL-MCNC: 114 MG/DL
TSH SERPL-ACNC: 2.03 MIU/L (ref 0.4–4.5)
WBC # BLD AUTO: 5 THOUSAND/UL (ref 3.8–10.8)

## 2022-10-10 ENCOUNTER — OFFICE VISIT (OUTPATIENT)
Dept: FAMILY MEDICINE CLINIC | Facility: CLINIC | Age: 62
End: 2022-10-10
Payer: COMMERCIAL

## 2022-10-10 VITALS
DIASTOLIC BLOOD PRESSURE: 68 MMHG | WEIGHT: 161 LBS | BODY MASS INDEX: 23.85 KG/M2 | HEART RATE: 98 BPM | OXYGEN SATURATION: 100 % | HEIGHT: 69 IN | TEMPERATURE: 98 F | SYSTOLIC BLOOD PRESSURE: 110 MMHG

## 2022-10-10 DIAGNOSIS — R10.9 ABDOMINAL CRAMPING: Primary | ICD-10-CM

## 2022-10-10 PROCEDURE — 99213 OFFICE O/P EST LOW 20 MIN: CPT | Performed by: FAMILY MEDICINE

## 2022-10-10 RX ORDER — DICYCLOMINE HYDROCHLORIDE 10 MG/1
10 CAPSULE ORAL 3 TIMES DAILY PRN
Qty: 20 CAPSULE | Refills: 0 | Status: SHIPPED | OUTPATIENT
Start: 2022-10-10

## 2022-10-10 NOTE — PROGRESS NOTES
Name: Rickey Orozco      : 1960      MRN: [id number]  Encounter Provider: Jackeline Judge DO  Encounter Date: 10/10/2022   Encounter department: [address] [place] [address]  Abdominal cramping  -     dicyclomine (BENTYL) 10 mg capsule; Take 1 capsule (10 mg total) by mouth 3 (three) times a day as needed (cramping)      nonacute abdominal exam   Discussed warning signs or indicators of both diverticulitis and appendicitis  Colonoscopy done less than 1 year ago did show diverticulosis  At this point symptoms relatively mild  Recommend conservative treatment with clear liquids, bland diet and dicyclomine as needed to treat symptoms  If symptoms worsen or if they persist may need imaging or antibiotics if diverticulitis suspected  Subjective      Patient presents with a 1 week history of vague abdominal discomfort involving some cramping in the mid/periumbilical area  No significant change in bowel habit  Slight increase in urinary flow  Patient has a history of prostate cancer  Most recent PSA proximally 2 and half months ago was undetectable  No diarrhea or blood in stools  No fever or chills  No nausea or vomiting  Appetite stable  Review of Systems   Constitutional: Negative  Respiratory: Negative  Cardiovascular: Negative  Gastrointestinal: Positive for abdominal pain  Genitourinary: Negative  Musculoskeletal: Negative  Psychiatric/Behavioral: Negative          Current Outpatient Medications on File Prior to Visit   Medication Sig   • ascorbic acid (VITAMIN C) 500 mg tablet Take 1,000 mg by mouth daily   • Cetirizine HCl 10 MG CAPS Take by mouth daily as needed   • DAILY MULTIPLE VITAMINS/IRON PO Take 1 tablet by mouth daily   • rosuvastatin (CRESTOR) 10 MG tablet TAKE 1 TABLET BY MOUTH EVERY DAY   • tadalafil (CIALIS) 5 MG tablet 1 tab daily   • [DISCONTINUED] Coenzyme Q10 (CO Q 10 PO) Take by mouth   • [DISCONTINUED] fluticasone (FLONASE) 50 mcg/act nasal spray 2 sprays into each nostril daily (Patient taking differently: 2 sprays into each nostril as needed)   • [DISCONTINUED] naproxen sodium (ANAPROX) 550 mg tablet Take 550 mg by mouth   • [DISCONTINUED] TURMERIC PO Take by mouth       Objective     /68 (BP Location: Left arm, Patient Position: Sitting, Cuff Size: Large)   Pulse 98   Temp 98 °F (36 7 °C) (Tympanic)   Ht 5' 9" (1 753 m)   Wt 73 kg (161 lb)   SpO2 100%   BMI 23 78 kg/m²     Physical Exam  Abdominal:      General: Abdomen is flat  Palpations: Abdomen is soft  There is no mass  Tenderness: There is no abdominal tenderness         Shukri Coyne, DO

## 2022-10-17 ENCOUNTER — TELEPHONE (OUTPATIENT)
Dept: FAMILY MEDICINE CLINIC | Facility: CLINIC | Age: 62
End: 2022-10-17

## 2022-10-17 DIAGNOSIS — R10.30 LOWER ABDOMINAL PAIN: Primary | ICD-10-CM

## 2022-10-17 NOTE — TELEPHONE ENCOUNTER
Please notify patient    I placed an order for a CT of the abdomen and pelvis with contrast   He can call central scheduling to set up

## 2022-10-17 NOTE — TELEPHONE ENCOUNTER
Pt still feels bloated and abdominal discomfort  Pt was told to call in if he still feels this way and O'jorge would order a CT  Please advise

## 2022-10-18 ENCOUNTER — TELEPHONE (OUTPATIENT)
Dept: FAMILY MEDICINE CLINIC | Facility: CLINIC | Age: 62
End: 2022-10-18

## 2022-10-24 ENCOUNTER — HOSPITAL ENCOUNTER (OUTPATIENT)
Dept: CT IMAGING | Facility: HOSPITAL | Age: 62
Discharge: HOME | End: 2022-10-24
Payer: COMMERCIAL

## 2022-10-24 DIAGNOSIS — R10.30 LOWER ABDOMINAL PAIN: ICD-10-CM

## 2022-10-24 PROCEDURE — G1004 CDSM NDSC: HCPCS

## 2022-10-24 PROCEDURE — 74177 CT ABD & PELVIS W/CONTRAST: CPT

## 2022-10-24 RX ADMIN — IOHEXOL 100 ML: 350 INJECTION, SOLUTION INTRAVENOUS at 08:12

## 2022-10-31 ENCOUNTER — IMMUNIZATIONS (OUTPATIENT)
Dept: FAMILY MEDICINE CLINIC | Facility: CLINIC | Age: 62
End: 2022-10-31

## 2022-10-31 DIAGNOSIS — Z23 NEED FOR VACCINATION: Primary | ICD-10-CM

## 2022-12-14 NOTE — PROGRESS NOTES
Assessment/Plan:    Kidney stones  Hx of recent stones, including 1 that required snaring/stenting  Currently doing well  No longer sees urology  Hyperlipidemia  Chol 166/107  Doing reasonably well on simvastatin 20 mg qd  Will maintain current dosage for now  Continue diet/exercise  Prediabetes  bs 103, a1c 5 4%  Continue reduced carb diet and exercise  Will continue to monitor  Allergic rhinitis due to pollen  Doing well on zyrtec prn  Peripheral polyneuropathy  Stable  No longer on meds (gabapentin)  No longer sees podiatry  Diagnoses and all orders for this visit:    Mixed hyperlipidemia  -     simvastatin (ZOCOR) 20 mg tablet; Take one tablet by mouth daily  -     Lipid Panel with Direct LDL reflex; Future    Prediabetes  -     Comprehensive metabolic panel; Future  -     Hemoglobin A1C; Future    Peripheral polyneuropathy    Kidney stones    Allergic rhinitis due to pollen, unspecified seasonality    Need for vaccination  -     influenza vaccine, 6565-5059, quadrivalent, recombinant, PF, 0 5 mL, for patients 18 yr+ (FLUBLOK)      RECHECK 1/2019, FBW PRIOR AT [facility] (THEN Q6)  CURRENT W/ COLONOSCOPY (2 YEARS AGO)  FLUBLOK TODAY    Subjective:      Patient ID: Lenora Gamino is a 59 y o  male  HPI    The following portions of the patient's history were reviewed and updated as appropriate: allergies, current medications, past family history, past medical history, past social history, past surgical history and problem list     Review of Systems   Respiratory: Negative  Cardiovascular: Negative  Gastrointestinal: Negative  Genitourinary: Negative            Objective:      /80 (BP Location: Left arm, Patient Position: Sitting, Cuff Size: Adult)   Pulse 68   Temp (!) 96 3 °F (35 7 °C) (Tympanic)   Resp 14   Ht 5' 10" (1 778 m)   Wt 71 6 kg (157 lb 12 8 oz)   SpO2 99%   BMI 22 64 kg/m²          Physical Exam   Cardiovascular: Normal rate, regular rhythm, normal heart sounds and intact distal pulses  Carotids: no bruits  Ext: no edema   Pulmonary/Chest: Effort normal  No respiratory distress  He has no wheezes  He has no rales  Psychiatric: He has a normal mood and affect   His behavior is normal  Thought content normal  show

## 2023-02-28 ENCOUNTER — RA CDI HCC (OUTPATIENT)
Dept: OTHER | Facility: HOSPITAL | Age: 63
End: 2023-02-28

## 2023-02-28 NOTE — PROGRESS NOTES
[facility]  coding opportunities       Chart reviewed, no opportunity found: CHART REVIEWED, NO OPPORTUNITY FOUND        Patients Insurance        Commercial Insurance: [address] [place] [address]

## 2023-03-13 ENCOUNTER — OFFICE VISIT (OUTPATIENT)
Dept: FAMILY MEDICINE CLINIC | Facility: CLINIC | Age: 63
End: 2023-03-13

## 2023-03-13 VITALS
DIASTOLIC BLOOD PRESSURE: 74 MMHG | BODY MASS INDEX: 22.9 KG/M2 | OXYGEN SATURATION: 99 % | WEIGHT: 160 LBS | TEMPERATURE: 97.7 F | RESPIRATION RATE: 16 BRPM | HEART RATE: 62 BPM | HEIGHT: 70 IN | SYSTOLIC BLOOD PRESSURE: 114 MMHG

## 2023-03-13 DIAGNOSIS — M48.062 SPINAL STENOSIS OF LUMBAR REGION WITH NEUROGENIC CLAUDICATION: Primary | ICD-10-CM

## 2023-03-13 DIAGNOSIS — M25.561 ARTHRALGIA OF RIGHT KNEE: ICD-10-CM

## 2023-03-13 DIAGNOSIS — R73.03 PREDIABETES: ICD-10-CM

## 2023-03-13 DIAGNOSIS — Z23 NEED FOR VACCINATION: ICD-10-CM

## 2023-03-13 DIAGNOSIS — C61 PROSTATE CANCER (HCC): ICD-10-CM

## 2023-03-13 DIAGNOSIS — H69.81 DYSFUNCTION OF RIGHT EUSTACHIAN TUBE: ICD-10-CM

## 2023-03-13 DIAGNOSIS — N52.9 ERECTILE DYSFUNCTION, UNSPECIFIED ERECTILE DYSFUNCTION TYPE: ICD-10-CM

## 2023-03-13 DIAGNOSIS — C79.11 SECONDARY MALIGNANT NEOPLASM OF BLADDER (HCC): ICD-10-CM

## 2023-03-13 DIAGNOSIS — E78.2 MIXED HYPERLIPIDEMIA: ICD-10-CM

## 2023-03-13 RX ORDER — ROSUVASTATIN CALCIUM 10 MG/1
10 TABLET, COATED ORAL DAILY
Qty: 90 TABLET | Refills: 1 | Status: SHIPPED | OUTPATIENT
Start: 2023-03-13

## 2023-03-13 RX ORDER — FLUTICASONE PROPIONATE 50 MCG
SPRAY, SUSPENSION (ML) NASAL
Qty: 18.2 ML | Refills: 2 | Status: SHIPPED | OUTPATIENT
Start: 2023-03-13 | End: 2023-03-14

## 2023-03-13 RX ORDER — SULFAMETHOXAZOLE AND TRIMETHOPRIM 800; 160 MG/1; MG/1
1 TABLET ORAL EVERY 12 HOURS SCHEDULED
Qty: 20 TABLET | Refills: 0 | Status: SHIPPED | OUTPATIENT
Start: 2023-03-13 | End: 2023-03-23

## 2023-03-13 NOTE — PROGRESS NOTES
Name: Moon Kendall      : 1960      MRN: [id number]  Encounter Provider: Deanne Alston DO  Encounter Date: 3/13/2023   Encounter department: [address] [place] [address]     1  Spinal stenosis of lumbar region with neurogenic claudication  Assessment & Plan:  Patient with history of spinal stenosis/low back pain  Gabapentin was ineffective  Patient did improve with physical therapy and home exercises  Currently is stable  2  Prostate cancer [facility])  Assessment & Plan:  History of prostate cancer  Diagnosed 2021  Patient had robotic prostatectomy 2021 at [facility], patient was found to have microinvasion of bladder neck  This was excised  He is doing well  He is not experiencing incontinence  He does have some ED, but this responds well to Cialis 5 mg daily  Continue regular follow-up with urology as directed  3  Mixed hyperlipidemia  Assessment & Plan:  Cholesterol from September was 181,   Doing well on rosuvastatin 10 mg daily  Patient is due for labs  We will call with results    Orders:  -     rosuvastatin (CRESTOR) 10 MG tablet; Take 1 tablet (10 mg total) by mouth daily    4  Erectile dysfunction, unspecified erectile dysfunction type  Assessment & Plan:  Doing well on Cialis 5 mg daily  5  Prediabetes  Assessment & Plan:  A1c from September was 5 5%  Continue diet and exercise  Patient is due for labs in near future  We will call with results      6  Need for vaccination  -     Pneumococcal Conjugate Vaccine 20-valent (Pcv20)    7  Secondary malignant neoplasm of bladder [facility])  Assessment & Plan:  (see prostate cancer)      8  Arthralgia of right knee  Assessment & Plan:  Patient with arthritis right knee  He is currently getting injections at orthopedics  He may need knee replacement      9  Dysfunction of right eustachian tube  -     sulfamethoxazole-trimethoprim (BACTRIM DS) 800-160 mg per tablet;  Take 1 tablet by mouth every 12 (twelve) hours for 10 days         Colonoscopy 2022 (next in 2027)    Patient had COVID vaccination  Prevnar 20 given tonight  Last tetanus booster 2022  Again discussed Shingrix (patient will consider)    Patient due for labs (CMP, A1c, lipids)  At [address]   We will call with results    6 months, fasting blood work prior at [address] [place] (we will need to place orders)  Subjective     Patient presents for recheck of chronic medical problems today  Overall he is doing well  Compliant with prescribed medications  Still being followed by urology    Review of Systems   Respiratory: Negative  Cardiovascular: Negative  Gastrointestinal: Negative  Genitourinary: Negative  Past Medical History:   Diagnosis Date   • Abnormal blood chemistry    • Allergic rhinitis     last assessed 08/22/2014   • Anxiety    • Elevated PSA    • Erectile dysfunction of non-organic origin    • Hyperglycemia     Last Assessed: 1/20/2016    • Hyperlipidemia    • Kidney stone    • Paresthesia of foot     last assessed 08/14/2015     Past Surgical History:   Procedure Laterality Date   • COLONOSCOPY     • FOOT SURGERY     • HAND SURGERY Left    • HERNIA REPAIR     • INGUINAL HERNIA REPAIR     • KNEE SURGERY     • ID COLONOSCOPY FLX DX W/COLLJ SPEC WHEN PFRMD N/A 01/10/2017    Procedure: COLONOSCOPY;  Surgeon: Yoselin Alvarez MD;  Location: [facility] GI LAB;   Service: Gastroenterology   • ID CYSTO/URETERO W/LITHOTRIPSY &INDWELL STENT INSRT Left 02/01/2018    Procedure: CYSTOSCOPY, LEFT URETEROSCOPY, RETROGRADE PYELOGRAM;  Surgeon: Chrissy Tony MD;  Location: [facility] MAIN OR;  Service: Urology   • PROSTATE BIOPSY  12/27/2019     Family History   Problem Relation Age of Onset   • Lung cancer Mother    • Lung cancer Father      Social History     Socioeconomic History   • Marital status: /Civil Union     Spouse name: None   • Number of children: None   • Years of education: None   • Highest education level: None Occupational History   • None   Tobacco Use   • Smoking status: Never   • Smokeless tobacco: Never   Vaping Use   • Vaping Use: Never used   Substance and Sexual Activity   • Alcohol use: Yes     Comment: occasional/social alcohol use    • Drug use: No   • Sexual activity: Yes     Partners: Female   Other Topics Concern   • None   Social History Narrative    Feels Safe at home     No guns in the home     Always uses seat belt      Social Determinants of Health     Financial Resource Strain: Not on file   Food Insecurity: Not on file   Transportation Needs: Not on file   Physical Activity: Not on file   Stress: Not on file   Social Connections: Not on file   Intimate Partner Violence: Not on file   Housing Stability: Not on file     Current Outpatient Medications on File Prior to Visit   Medication Sig   • ascorbic acid (VITAMIN C) 500 mg tablet Take 1,000 mg by mouth daily   • Cetirizine HCl 10 MG CAPS Take by mouth daily as needed   • DAILY MULTIPLE VITAMINS/IRON PO Take 1 tablet by mouth daily   • tadalafil (CIALIS) 5 MG tablet 1 tab daily     Allergies   Allergen Reactions   • Other      trees   • Pollen Extract      Immunization History   Administered Date(s) Administered   • COVID-19 MODERNA VACC 0 5 ML IM 03/27/2021, 04/24/2021, 10/25/2021, 06/02/2022   • COVID-19 Moderna Vac BIVALENT 12 Yr+ IM (BOOSTER ONLY) 0 5 ML 11/14/2022   • Hep A / Hep B 03/06/2003, 03/06/2003, 05/23/2003, 05/23/2003, 11/10/2003, 11/10/2003   • Influenza Quadrivalent, 6-35 Months IM 12/29/2015, 08/21/2017   • Influenza, recombinant, quadrivalent,injectable, preservative free 09/05/2018, 01/16/2020, 11/09/2021, 10/31/2022   • Influenza, seasonal, injectable 10/25/2013   • Pneumococcal Conjugate Vaccine 20-valent (Pcv20), Polysace 03/13/2023   • Tdap 03/09/2022       Objective     /74 (BP Location: Left arm, Patient Position: Sitting, Cuff Size: Adult)   Pulse 62   Temp 97 7 °F (36 5 °C) (Temporal)   Resp 16   Ht 5' 9 69" (1 77 m)   Wt 72 6 kg (160 lb)   SpO2 99%   BMI 23 17 kg/m²     Physical Exam  Cardiovascular:      Rate and Rhythm: Normal rate and regular rhythm  Heart sounds: Normal heart sounds  Comments: Carotids: no bruits  Ext: no edema  Pulmonary:      Effort: Pulmonary effort is normal  No respiratory distress  Breath sounds: No wheezing or rales  Psychiatric:         Behavior: Behavior normal          Thought Content:  Thought content normal        Nayeli Lott DO

## 2023-03-13 NOTE — ASSESSMENT & PLAN NOTE
Patient with history of spinal stenosis/low back pain  Gabapentin was ineffective  Patient did improve with physical therapy and home exercises  Currently is stable

## 2023-03-13 NOTE — ASSESSMENT & PLAN NOTE
A1c from September was 5 5%  Continue diet and exercise  Patient is due for labs in near future    We will call with results

## 2023-03-13 NOTE — ASSESSMENT & PLAN NOTE
Cholesterol from September was 181,   Doing well on rosuvastatin 10 mg daily  Patient is due for labs    We will call with results

## 2023-03-13 NOTE — ASSESSMENT & PLAN NOTE
History of prostate cancer  Diagnosed September 2021  Patient had robotic prostatectomy November 2021 at [facility], patient was found to have microinvasion of bladder neck  This was excised  He is doing well  He is not experiencing incontinence  He does have some ED, but this responds well to Cialis 5 mg daily  Continue regular follow-up with urology as directed

## 2023-03-13 NOTE — ASSESSMENT & PLAN NOTE
Patient with arthritis right knee  He is currently getting injections at orthopedics    He may need knee replacement

## 2023-03-14 RX ORDER — FLUTICASONE PROPIONATE 50 MCG
SPRAY, SUSPENSION (ML) NASAL
Qty: 16 ML | Refills: 2 | Status: SHIPPED | OUTPATIENT
Start: 2023-03-14

## 2023-03-17 DIAGNOSIS — R73.03 PREDIABETES: ICD-10-CM

## 2023-03-17 DIAGNOSIS — E78.2 MIXED HYPERLIPIDEMIA: Primary | ICD-10-CM

## 2023-03-17 DIAGNOSIS — Z13.29 SCREENING FOR THYROID DISORDER: ICD-10-CM

## 2023-03-17 DIAGNOSIS — Z13.0 SCREENING FOR DEFICIENCY ANEMIA: ICD-10-CM

## 2023-03-17 LAB
ALBUMIN SERPL-MCNC: 4 G/DL (ref 3.6–5.1)
ALBUMIN/GLOB SERPL: 1.4 (CALC) (ref 1–2.5)
ALP SERPL-CCNC: 34 U/L (ref 35–144)
ALT SERPL-CCNC: 17 U/L (ref 9–46)
AST SERPL-CCNC: 19 U/L (ref 10–35)
BILIRUB SERPL-MCNC: 0.7 MG/DL (ref 0.2–1.2)
BUN SERPL-MCNC: 23 MG/DL (ref 7–25)
BUN/CREAT SERPL: ABNORMAL (CALC) (ref 6–22)
CALCIUM SERPL-MCNC: 9.2 MG/DL (ref 8.6–10.3)
CHLORIDE SERPL-SCNC: 109 MMOL/L (ref 98–110)
CHOLEST SERPL-MCNC: 168 MG/DL
CHOLEST/HDLC SERPL: 4.3 (CALC)
CO2 SERPL-SCNC: 27 MMOL/L (ref 20–32)
CREAT SERPL-MCNC: 0.93 MG/DL (ref 0.7–1.35)
GFR/BSA.PRED SERPLBLD CYS-BASED-ARV: 91 ML/MIN/1.73M2
GLOBULIN SER CALC-MCNC: 2.9 G/DL (CALC) (ref 1.9–3.7)
GLUCOSE SERPL-MCNC: 102 MG/DL (ref 65–99)
HBA1C MFR BLD: 5.4 % OF TOTAL HGB
HDLC SERPL-MCNC: 39 MG/DL
LDLC SERPL CALC-MCNC: 108 MG/DL (CALC)
NONHDLC SERPL-MCNC: 129 MG/DL (CALC)
POTASSIUM SERPL-SCNC: 4.2 MMOL/L (ref 3.5–5.3)
PROT SERPL-MCNC: 6.9 G/DL (ref 6.1–8.1)
SODIUM SERPL-SCNC: 143 MMOL/L (ref 135–146)
TRIGL SERPL-MCNC: 113 MG/DL

## 2023-03-28 DIAGNOSIS — H69.81 DYSFUNCTION OF RIGHT EUSTACHIAN TUBE: ICD-10-CM

## 2023-03-28 RX ORDER — FLUTICASONE PROPIONATE 50 MCG
SPRAY, SUSPENSION (ML) NASAL
Qty: 48 ML | Refills: 1 | Status: SHIPPED | OUTPATIENT
Start: 2023-03-28

## 2023-08-01 ENCOUNTER — CLINICAL SUPPORT (OUTPATIENT)
Dept: FAMILY MEDICINE CLINIC | Facility: CLINIC | Age: 63
End: 2023-08-01
Payer: COMMERCIAL

## 2023-08-01 DIAGNOSIS — Z23 NEED FOR VACCINATION: Primary | ICD-10-CM

## 2023-08-01 PROCEDURE — 90471 IMMUNIZATION ADMIN: CPT

## 2023-08-01 PROCEDURE — 90750 HZV VACC RECOMBINANT IM: CPT

## 2023-09-18 ENCOUNTER — OFFICE VISIT (OUTPATIENT)
Dept: FAMILY MEDICINE CLINIC | Facility: CLINIC | Age: 63
End: 2023-09-18
Payer: COMMERCIAL

## 2023-09-18 VITALS
WEIGHT: 157.6 LBS | HEIGHT: 70 IN | BODY MASS INDEX: 22.56 KG/M2 | DIASTOLIC BLOOD PRESSURE: 76 MMHG | TEMPERATURE: 98.3 F | SYSTOLIC BLOOD PRESSURE: 112 MMHG | HEART RATE: 83 BPM | OXYGEN SATURATION: 98 %

## 2023-09-18 DIAGNOSIS — Z13.29 SCREENING FOR THYROID DISORDER: ICD-10-CM

## 2023-09-18 DIAGNOSIS — Z13.0 SCREENING FOR DEFICIENCY ANEMIA: ICD-10-CM

## 2023-09-18 DIAGNOSIS — R73.03 PREDIABETES: ICD-10-CM

## 2023-09-18 DIAGNOSIS — K58.1 IRRITABLE BOWEL SYNDROME WITH CONSTIPATION: ICD-10-CM

## 2023-09-18 DIAGNOSIS — M48.062 SPINAL STENOSIS OF LUMBAR REGION WITH NEUROGENIC CLAUDICATION: ICD-10-CM

## 2023-09-18 DIAGNOSIS — E78.2 MIXED HYPERLIPIDEMIA: ICD-10-CM

## 2023-09-18 DIAGNOSIS — C79.11 SECONDARY MALIGNANT NEOPLASM OF BLADDER (HCC): ICD-10-CM

## 2023-09-18 DIAGNOSIS — C61 PROSTATE CANCER (HCC): Primary | ICD-10-CM

## 2023-09-18 PROCEDURE — 99397 PER PM REEVAL EST PAT 65+ YR: CPT | Performed by: FAMILY MEDICINE

## 2023-09-18 RX ORDER — ROSUVASTATIN CALCIUM 10 MG/1
10 TABLET, COATED ORAL DAILY
Qty: 90 TABLET | Refills: 1 | Status: SHIPPED | OUTPATIENT
Start: 2023-09-18

## 2023-09-18 NOTE — PROGRESS NOTES
Name: Wayne Meyers      : 1960      MRN: [id number]  Encounter Provider: Viola Travis DO  Encounter Date: 2023   Encounter department: [address] [place], [place] [address]     1. Prostate cancer [facility])  Assessment & Plan:  History of prostate cancer diagnosed in 2021. Patient had robotic prostatectomy 2021 at [facility]. Patient was found to have microinvasion of the bladder neck at that time, which was excised. He continues to do well. He has not experienced incontinence or ED. His PSAs been undetectable. Continue regular follow-up with urologist at [facility]      2. Spinal stenosis of lumbar region with neurogenic claudication  Assessment & Plan:  History of spinal stenosis low back. Currently stable      3. Mixed hyperlipidemia  Assessment & Plan:  Last cholesterol from 2023 was 168, . Doing reasonably well on rosuvastatin 10 mg daily. Continue low-fat diet and exercise. Orders:  -     Lipid Panel with Direct LDL reflex; Future  -     rosuvastatin (CRESTOR) 10 MG tablet; Take 1 tablet (10 mg total) by mouth daily  -     Lipid Panel with Direct LDL reflex    4. Prediabetes  Assessment & Plan:  Blood sugar 102, A1c stable at 5.4%. Continue reduced carb diet and exercise. We will continue to monitor    Orders:  -     Comprehensive metabolic panel; Future  -     Hemoglobin A1c (w/out EAG); Future  -     Comprehensive metabolic panel  -     Hemoglobin A1c (w/out EAG)    5. Secondary malignant neoplasm of bladder [facility])  Assessment & Plan:  (See prostate cancer)      6. Irritable bowel syndrome with constipation  Assessment & Plan:  Polo Tovar has been complaining of occasional bloating and constipation lately. He recently started Metamucil, 2 teaspoons daily with 8 ounces of water and has noticed some improvement. He had a colonoscopy done in  which showed polyps. Recommendation is repeat again in .   He denies any blood in stool or any change in stool consistency. His examination today is unremarkable. Recommend continue to look for dietary triggers. Recommend continue to titrate Metamucil to desired effect. If symptoms persist or worsen, consider repeat colonoscopy      7. Screening for deficiency anemia  -     CBC and differential; Future  -     CBC and differential    8. Screening for thyroid disorder  -     TSH, 3rd generation with Free T4 reflex; Future  -     TSH, 3rd generation with Free T4 reflex        Patient presents for 70year-old physical examination and med check today. Overall he is feeling well. He does have some occasional issues with abdominal bloating and constipation lately. Denies any blood in stool. He had colonoscopy in 2022. He is compliant with prescribed medications. Still being followed by urologist regularly. He states his diet is healthy. 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 He drinks rare/occasions. He drinks 16 ounces of coffee daily. His examination is unremarkable today. Recommend continue healthy diet and regular exercise program    Last colonoscopy 2022 (polyps, next 2027    Patient had Prevnar  Last tetanus booster 2022  Patient had first Shingrix vaccination recently    Orders given for fasting blood work to be done at [facility].  We will call with results    6 months (?labs)         Subjective     Patient presents for 70year-old physical examination and med check today. Overall he is feeling well. He does have some occasional issues with abdominal bloating and constipation lately. Denies any blood in stool. He had colonoscopy in 2022. He is compliant with prescribed medications. Still being followed by urologist regularly. He states his diet is healthy. 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 He drinks rare/occasions. He drinks 16 ounces of coffee daily.     Review of Systems   Constitutional: Negative for chills and fever.   HENT: Negative for ear pain and sore throat. Eyes: Negative for pain and visual disturbance. Respiratory: Negative for cough and shortness of breath. Cardiovascular: Negative for chest pain and palpitations. Gastrointestinal: Negative for abdominal pain and vomiting. Genitourinary: Negative for dysuria and hematuria. Musculoskeletal: Negative for arthralgias and back pain. Skin: Negative for color change and rash. Neurological: Negative for seizures and syncope. All other systems reviewed and are negative. Past Medical History:   Diagnosis Date   • Abnormal blood chemistry    • Allergic rhinitis     last assessed 08/22/2014   • Anxiety    • Elevated PSA    • Erectile dysfunction of non-organic origin    • Hyperglycemia     Last Assessed: 1/20/2016    • Hyperlipidemia    • Kidney stone    • Paresthesia of foot     last assessed 08/14/2015     Past Surgical History:   Procedure Laterality Date   • COLONOSCOPY     • FOOT SURGERY     • HAND SURGERY Left    • HERNIA REPAIR     • INGUINAL HERNIA REPAIR     • KNEE SURGERY     • MS COLONOSCOPY FLX DX W/COLLJ SPEC WHEN PFRMD N/A 01/10/2017    Procedure: COLONOSCOPY;  Surgeon: Erinn Appiah MD;  Location: [facility] GI LAB;   Service: Gastroenterology   • MS CYSTO/URETERO W/LITHOTRIPSY &INDWELL STENT INSRT Left 02/01/2018    Procedure: CYSTOSCOPY, LEFT URETEROSCOPY, RETROGRADE PYELOGRAM;  Surgeon: Joline Primrose, MD;  Location: [facility]  MAIN [facility];  Service: Urology   • PROSTATE BIOPSY  12/27/2019     Family History   Problem Relation Age of Onset   • Lung cancer Mother    • Lung cancer Father      Social History     Socioeconomic History   • Marital status: /Civil Union     Spouse name: None   • Number of children: None   • Years of education: None   • Highest education level: None   Occupational History   • None   Tobacco Use   • Smoking status: Never   • Smokeless tobacco: Never   Vaping Use   • Vaping Use: Never used   Substance and Sexual Activity   • Alcohol use: Yes     Comment: occasional/social alcohol use    • Drug use: No   • Sexual activity: Yes     Partners: Female   Other Topics Concern   • None   Social History Narrative    Feels Safe at home     No guns in the home     Always uses seat belt      Social Determinants of Health     Financial Resource Strain: Not on file   Food Insecurity: Not on file   Transportation Needs: Not on file   Physical Activity: Not on file   Stress: Not on file   Social Connections: Not on file   Intimate Partner Violence: Not on file   Housing Stability: Not on file     Current Outpatient Medications on File Prior to Visit   Medication Sig   • ascorbic acid (VITAMIN C) 500 mg tablet Take 1,000 mg by mouth daily   • DAILY MULTIPLE VITAMINS/IRON PO Take 1 tablet by mouth daily   • tadalafil (CIALIS) 5 MG tablet 1 tab daily   • [DISCONTINUED] Cetirizine HCl 10 MG CAPS Take by mouth daily as needed   • [DISCONTINUED] fluticasone (FLONASE) 50 mcg/act nasal spray 2 SPRAYS RIGHT NOSTRIL 2 TIMES DAILY   • [DISCONTINUED] rosuvastatin (CRESTOR) 10 MG tablet Take 1 tablet (10 mg total) by mouth daily     Allergies   Allergen Reactions   • Other      trees   • Pollen Extract      Immunization History   Administered Date(s) Administered   • COVID-19 MODERNA VACC 0.5 ML IM 03/27/2021, 04/24/2021, 10/25/2021, 06/02/2022   • COVID-19 Moderna Vac BIVALENT 12 Yr+ IM 0.5 ML 11/14/2022   • Hep A / Hep B 03/06/2003, 03/06/2003, 05/23/2003, 05/23/2003, 11/10/2003, 11/10/2003   • Influenza Quadrivalent, 6-35 Months IM 12/29/2015, 08/21/2017   • Influenza, recombinant, quadrivalent,injectable, preservative free 09/05/2018, 01/16/2020, 11/09/2021, 10/31/2022   • Influenza, seasonal, injectable 10/25/2013   • Pneumococcal Conjugate Vaccine 20-valent (Pcv20), Polysace 03/13/2023   • Tdap 03/09/2022   • Zoster Vaccine Recombinant 08/01/2023       Objective     /76 (BP Location: Left arm, Patient Position: Sitting, Cuff Size: Adult) Pulse 83   Temp 98.3 °F (36.8 °C)   Ht 5' 10.47" (1.79 m)   Wt 71.5 kg (157 lb 9.6 oz)   SpO2 98%   BMI 22.31 kg/m²     Physical Exam  Vitals and nursing note reviewed. Constitutional:       Appearance: He is well-developed. HENT:      Head: Normocephalic and atraumatic. Right Ear: External ear normal.      Left Ear: External ear normal.   Eyes:      Pupils: Pupils are equal, round, and reactive to light. Cardiovascular:      Rate and Rhythm: Normal rate and regular rhythm. Heart sounds: Normal heart sounds. Pulmonary:      Effort: Pulmonary effort is normal.      Breath sounds: Normal breath sounds. Abdominal:      General: Bowel sounds are normal.      Palpations: Abdomen is soft. Musculoskeletal:      Cervical back: Normal range of motion and neck supple. Neurological:      Mental Status: He is alert and oriented to person, place, and time. Deep Tendon Reflexes: Reflexes are normal and symmetric. Psychiatric:         Behavior: Behavior normal.         Thought Content:  Thought content normal.         Judgment: Judgment normal.       Janece Shells, DO

## 2023-09-18 NOTE — ASSESSMENT & PLAN NOTE
History of prostate cancer diagnosed in September 2021. Patient had robotic prostatectomy November 2021 at [facility]. Patient was found to have microinvasion of the bladder neck at that time, which was excised. He continues to do well. He has not experienced incontinence or ED. His PSAs been undetectable.   Continue regular follow-up with urologist at [facility]

## 2023-09-18 NOTE — ASSESSMENT & PLAN NOTE
Last cholesterol from March 2023 was 168, . Doing reasonably well on rosuvastatin 10 mg daily. Continue low-fat diet and exercise.

## 2023-09-18 NOTE — ASSESSMENT & PLAN NOTE
Panchito Mas has been complaining of occasional bloating and constipation lately. He recently started Metamucil, 2 teaspoons daily with 8 ounces of water and has noticed some improvement. He had a colonoscopy done in 2022 which showed polyps. Recommendation is repeat again in 2027. He denies any blood in stool or any change in stool consistency. His examination today is unremarkable. Recommend continue to look for dietary triggers. Recommend continue to titrate Metamucil to desired effect.   If symptoms persist or worsen, consider repeat colonoscopy

## 2023-09-18 NOTE — ASSESSMENT & PLAN NOTE
[facility] Dosing [facility]  Antibiotic Dosing    Mik Artist is a 4year Blood sugar 102, A1c stable at 5.4%. Continue reduced carb diet and exercise.   We will continue to monitor

## 2023-09-26 LAB
ALBUMIN SERPL-MCNC: 4.1 G/DL (ref 3.6–5.1)
ALBUMIN/GLOB SERPL: 1.3 (CALC) (ref 1–2.5)
ALP SERPL-CCNC: 40 U/L (ref 35–144)
ALT SERPL-CCNC: 18 U/L (ref 9–46)
AST SERPL-CCNC: 18 U/L (ref 10–35)
BASOPHILS # BLD AUTO: 38 CELLS/UL (ref 0–200)
BASOPHILS NFR BLD AUTO: 0.8 %
BILIRUB SERPL-MCNC: 0.7 MG/DL (ref 0.2–1.2)
BUN SERPL-MCNC: 20 MG/DL (ref 7–25)
BUN/CREAT SERPL: ABNORMAL (CALC) (ref 6–22)
CALCIUM SERPL-MCNC: 9.3 MG/DL (ref 8.6–10.3)
CHLORIDE SERPL-SCNC: 106 MMOL/L (ref 98–110)
CHOLEST SERPL-MCNC: 182 MG/DL
CHOLEST/HDLC SERPL: 4.7 (CALC)
CO2 SERPL-SCNC: 27 MMOL/L (ref 20–32)
CREAT SERPL-MCNC: 1.02 MG/DL (ref 0.7–1.35)
EOSINOPHIL # BLD AUTO: 211 CELLS/UL (ref 15–500)
EOSINOPHIL NFR BLD AUTO: 4.4 %
ERYTHROCYTE [DISTWIDTH] IN BLOOD BY AUTOMATED COUNT: 12.6 % (ref 11–15)
GFR/BSA.PRED SERPLBLD CYS-BASED-ARV: 82 ML/MIN/1.73M2
GLOBULIN SER CALC-MCNC: 3.2 G/DL (CALC) (ref 1.9–3.7)
GLUCOSE SERPL-MCNC: 101 MG/DL (ref 65–99)
HBA1C MFR BLD: 5.4 % OF TOTAL HGB
HCT VFR BLD AUTO: 44.7 % (ref 38.5–50)
HDLC SERPL-MCNC: 39 MG/DL
HGB BLD-MCNC: 15.1 G/DL (ref 13.2–17.1)
LDLC SERPL CALC-MCNC: 118 MG/DL (CALC)
LYMPHOCYTES # BLD AUTO: 898 CELLS/UL (ref 850–3900)
LYMPHOCYTES NFR BLD AUTO: 18.7 %
MCH RBC QN AUTO: 29.8 PG (ref 27–33)
MCHC RBC AUTO-ENTMCNC: 33.8 G/DL (ref 32–36)
MCV RBC AUTO: 88.3 FL (ref 80–100)
MONOCYTES # BLD AUTO: 576 CELLS/UL (ref 200–950)
MONOCYTES NFR BLD AUTO: 12 %
NEUTROPHILS # BLD AUTO: 3077 CELLS/UL (ref 1500–7800)
NEUTROPHILS NFR BLD AUTO: 64.1 %
NONHDLC SERPL-MCNC: 143 MG/DL (CALC)
PLATELET # BLD AUTO: 246 THOUSAND/UL (ref 140–400)
PMV BLD REES-ECKER: 11.4 FL (ref 7.5–12.5)
POTASSIUM SERPL-SCNC: 4 MMOL/L (ref 3.5–5.3)
PROT SERPL-MCNC: 7.3 G/DL (ref 6.1–8.1)
RBC # BLD AUTO: 5.06 MILLION/UL (ref 4.2–5.8)
SODIUM SERPL-SCNC: 139 MMOL/L (ref 135–146)
TRIGL SERPL-MCNC: 141 MG/DL
TSH SERPL-ACNC: 2.03 MIU/L (ref 0.4–4.5)
WBC # BLD AUTO: 4.8 THOUSAND/UL (ref 3.8–10.8)

## 2023-11-14 ENCOUNTER — CLINICAL SUPPORT (OUTPATIENT)
Dept: FAMILY MEDICINE CLINIC | Facility: CLINIC | Age: 63
End: 2023-11-14
Payer: COMMERCIAL

## 2023-11-14 DIAGNOSIS — Z23 ENCOUNTER FOR IMMUNIZATION: Primary | ICD-10-CM

## 2023-11-14 PROCEDURE — 90750 HZV VACC RECOMBINANT IM: CPT

## 2023-11-14 PROCEDURE — 90471 IMMUNIZATION ADMIN: CPT

## 2024-01-03 ENCOUNTER — OFFICE VISIT (OUTPATIENT)
Dept: PODIATRY | Facility: CLINIC | Age: 64
End: 2024-01-03
Payer: COMMERCIAL

## 2024-01-03 VITALS — WEIGHT: 154 LBS | BODY MASS INDEX: 22.05 KG/M2 | HEIGHT: 70 IN | RESPIRATION RATE: 18 BRPM

## 2024-01-03 DIAGNOSIS — M79.674 PAIN IN TOE OF RIGHT FOOT: ICD-10-CM

## 2024-01-03 DIAGNOSIS — L60.0 INGROWN TOENAIL: Primary | ICD-10-CM

## 2024-01-03 DIAGNOSIS — B35.1 ONYCHOMYCOSIS: ICD-10-CM

## 2024-01-03 PROCEDURE — 99212 OFFICE O/P EST SF 10 MIN: CPT | Performed by: PODIATRIST

## 2024-01-03 PROCEDURE — 11730 AVULSION NAIL PLATE SIMPLE 1: CPT | Performed by: PODIATRIST

## 2024-01-03 RX ORDER — TERBINAFINE HYDROCHLORIDE 250 MG/1
TABLET ORAL
Qty: 42 TABLET | Refills: 0 | Status: SHIPPED | OUTPATIENT
Start: 2024-01-03 | End: 2024-03-06

## 2024-01-03 RX ORDER — LIDOCAINE HYDROCHLORIDE AND EPINEPHRINE 10; 10 MG/ML; UG/ML
1 INJECTION, SOLUTION INFILTRATION; PERINEURAL ONCE
Status: COMPLETED | OUTPATIENT
Start: 2024-01-03 | End: 2024-01-03

## 2024-01-03 RX ORDER — LIDOCAINE HYDROCHLORIDE 10 MG/ML
1 INJECTION, SOLUTION EPIDURAL; INFILTRATION; INTRACAUDAL; PERINEURAL ONCE
Status: COMPLETED | OUTPATIENT
Start: 2024-01-03 | End: 2024-01-03

## 2024-01-03 RX ADMIN — LIDOCAINE HYDROCHLORIDE 1 ML: 10 INJECTION, SOLUTION EPIDURAL; INFILTRATION; INTRACAUDAL; PERINEURAL at 16:40

## 2024-01-03 RX ADMIN — LIDOCAINE HYDROCHLORIDE AND EPINEPHRINE 1 ML: 10; 10 INJECTION, SOLUTION INFILTRATION; PERINEURAL at 16:39

## 2024-01-03 NOTE — PROGRESS NOTES
Patient presents with multiple concerns.  His chief complaint involves a painful ingrown toenail along the lateral nail border of the right third toe.  Patient is also concerned regarding thickness and yellowing multiple toenails.  The nails are not painful.    On exam, pain with palpation lateral nail border of the right third toe.  There is no evidence of paronychia.  The right great toe and left second and third toes are thick and yellow with subungual debris consistent with onychomycosis.    Discussed treatment options for onychomycosis.  Patient currently is taking a statin.  Pulsed dose Lamisil was advised.  Prescription for 42 tablets were dispensed.  He will take 1 tablet daily for 2 weeks and then discontinue for 2 weeks and continue with this regimen for 2 additional months.  He knows that it would take 9 months for us to know if this was successful.  Also that success rate is 50%.    Discussed treatment options for ingrown nails.  Anesthesia via 2 cc of a 1: 1 mixture of 1% Xylocaine with epinephrine and 1% Xylocaine plain.  Betadine prep of toe.  Lateral nail border of the right third toe was avulsed to the eponychium.  Bacitracin dressing applied.  Patient will contact me if he has difficulty healing after 1 week.  He will be seen in 2 to assess response to pulsed dose Lamisil.    Nail removal    Date/Time: 1/3/2024 4:00 PM    Performed by: Tray Saldana DPM  Authorized by: Tray Saldana DPM    Patient location:  ClinicUniversal Protocol:  Consent: Verbal consent obtained.  Risks and benefits: risks, benefits and alternatives were discussed  Consent given by: patient  Patient understanding: patient states understanding of the procedure being performed  Patient identity confirmed: verbally with patient    Location:     Foot:  R third toe  Pre-procedure details:     Skin preparation:  Betadine    Preparation: Patient was prepped and draped in the usual sterile fashion    Anesthesia (see MAR for exact  dosages):     Anesthesia method:  Nerve block    Block needle gauge:  25 G    Block anesthetic:  Lidocaine 1% WITH epi and lidocaine 1% w/o epi    Block injection procedure:  Anatomic landmarks identified    Block outcome:  Anesthesia achieved  Nail Removal:     Nail removed:  Partial    Nail side:  Lateral    Nail bed sutured: no    Ingrown nail:     Wedge excision of skin: no      Nail matrix removed or ablated:  None  Post-procedure details:     Dressing:  4x4 sterile gauze, antibiotic ointment and gauze roll    Patient tolerance of procedure:  Tolerated well, no immediate complications

## 2024-01-09 ENCOUNTER — EVALUATION (OUTPATIENT)
Dept: PHYSICAL THERAPY | Facility: REHABILITATION | Age: 64
End: 2024-01-09
Payer: COMMERCIAL

## 2024-01-09 DIAGNOSIS — M25.561 CHRONIC PAIN OF BOTH KNEES: Primary | ICD-10-CM

## 2024-01-09 DIAGNOSIS — M25.562 CHRONIC PAIN OF BOTH KNEES: Primary | ICD-10-CM

## 2024-01-09 DIAGNOSIS — G89.29 CHRONIC PAIN OF BOTH KNEES: Primary | ICD-10-CM

## 2024-01-09 PROCEDURE — 97161 PT EVAL LOW COMPLEX 20 MIN: CPT | Performed by: PHYSICAL THERAPIST

## 2024-01-09 PROCEDURE — 97112 NEUROMUSCULAR REEDUCATION: CPT | Performed by: PHYSICAL THERAPIST

## 2024-01-09 NOTE — PROGRESS NOTES
PT Evaluation     Today's date: 2024  Patient name: Wayne Mosley  : 1960  MRN: [id number]  Referring provider: Valerie Bailey DO  Dx:   Encounter Diagnosis     ICD-10-CM    1. Chronic pain of both knees  M25.561     M25.562     G89.29           Start Time: 09  Stop Time: 945  Total time in clinic (min): 40 minutes    Assessment  Assessment details: Patient is a 63 y.o. male presenting to initial examination with chief complaint of B/L knee pain. Signs and symptoms are consistent with imaging-confirmed knee OA. Primary impairments include hip abduction and extension strength deficits. As a result of impairments patient experiences limitations with functional/daily activities including pain with activity and lifting heavy objects. Educated patient regarding plan of care and answered all patient questions to patient satisfaction. Patient would benefit from skilled PT interventions to address above impairments, achieve goals, and to maximize function. Thank you for the referral.    Impairments: abnormal muscle firing, abnormal or restricted ROM, abnormal movement, activity intolerance, impaired physical strength and pain with function     Prognosis: good    Goals  Impairment Goals: 4-6 weeks  - Patient to decrease pain to 0/10  - Patient to increase hip strength to 4+/5 throughout    Functional Goals: by discharge  - Patient to discharge to independent HEP  - Patient to return to prior level of function  - Patient to improve tolerance to refereeing    Plan  Patient would benefit from: skilled physical therapy  Planned modality interventions: cryotherapy, TENS and thermotherapy: hydrocollator packs  Planned therapy interventions: flexibility, home exercise program, joint mobilization, manual therapy, neuromuscular re-education, patient education, strengthening, stretching, therapeutic activities, therapeutic exercise and functional ROM exercises  Frequency: 1x week  Duration in weeks: 6  Treatment  plan discussed with: patient      Subjective Evaluation    History of Present Illness  Mechanism of injury: HISTORY OF PRESENT ILLNESS: Patient notes chronic history of bilateral knee pain. He recently underwent CSI of L knee with positive response. 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 He underwent x-rays which showed bilateral OA L > R. Patient notes occasional catching with pivoting in his L knee. He was told he is likely headed for TKA at some point.   PRIOR TREATMENT: CSI  AGGRAVATING FACTORS: running, refereeing basketball  EASING FACTORS: ice, Ibuprofen  WORK: waste  (walking) and high school   IMAGING: x-rays notable for OA  FUNCTIONAL LIMITATIONS: pain with activity and lifting heavy objects  SUBJECTIVE FUNCTIONAL LEVEL: 95%  PATIENT GOAL: I'd like to reduce my pain with running  Pain  Current pain ratin  At best pain ratin  At worst pain ratin  Location: B/L knees        Objective     Active Range of Motion   Left Knee   Flexion: 142 degrees   Extension: -3 degrees     Right Knee   Flexion: 145 degrees   Extension: -1 degrees     Passive Range of Motion   Left Knee   Extension: 0 degrees     Right Knee   Extension: 0 degrees     Strength/Myotome Testing     Left Hip   Planes of Motion   Flexion: 5  Extension: 3+  Abduction: 4  External rotation: 5  Internal rotation: 5    Right Hip   Planes of Motion   Flexion: 5  Extension: 4  Abduction: 4  External rotation: 5  Internal rotation: 5    Left Knee   Flexion: 5  Extension: 5    Right Knee   Flexion: 5  Extension: 5    Left Ankle/Foot   Dorsiflexion: 5    Right Ankle/Foot   Dorsiflexion: 5    Additional Strength Details  R SLR: 4+  L SLR: 4+    Tests     Left Hip   Positive Ely's.     Right Hip   Positive Ely's.       Flowsheet Rows      Flowsheet Row Most Recent Value   PT/OT G-Codes    Current Score 77   Projected Score 77               Diagnosis:  B/L knee OA   Precautions: none   Primary impairments: hip abduction and extension strength deficits   *asterisks by exercise = given for HEP    1/9       Manuals                                                There Ex        Rec bike        Prone quad stretch                                                                Neuro Re-Ed        Bridges on ball        S/L hip abd *  X 15 B       S/L hip circles        S/L clamshells        Side plank on knees        Prone hip ext *  X 15 B       Prone kickbacks        U/L leg press        Sidestepping and waddle walks        Standing hip abd iso                                        Re-evaluation             Ther Act/Gait                                         Modalities

## 2024-01-09 NOTE — LETTER
2024    Valerie Bailey DO  [address] [facility] [address]    Patient: Wayne Mosley   YOB: 1960   Date of Visit: 2024     Encounter Diagnosis     ICD-10-CM    1. Chronic pain of both knees  M25.561     M25.562     G89.29           Dear Dr. Bailey:    Thank you for your recent referral of Wayne Mosley. Please review the attached evaluation summary from Wayne's recent visit.     Please verify that you agree with the plan of care by signing the attached order.     If you have any questions or concerns, please do not hesitate to call.     I sincerely appreciate the opportunity to share in the care of one of your patients and hope to have another opportunity to work with you in the near future.       Sincerely,    Danny Tay, PT      Referring Provider:      I certify that I have read the below Plan of Care and certify the need for these services furnished under this plan of treatment while under my care.                    Valerie Bailey DO  [address] [facility] [address]  Via Fax: [phone]          PT Evaluation     Today's date: 2024  Patient name: Wayne Mosley  : 1960  MRN: [id number]  Referring provider: Valerie Bailey DO  Dx:   Encounter Diagnosis     ICD-10-CM    1. Chronic pain of both knees  M25.561     M25.562     G89.29           Start Time: 09  Stop Time: 45  Total time in clinic (min): 40 minutes    Assessment  Assessment details: Patient is a 63 y.o. male presenting to initial examination with chief complaint of B/L knee pain. Signs and symptoms are consistent with imaging-confirmed knee OA. Primary impairments include hip abduction and extension strength deficits. As a result of impairments patient experiences limitations with functional/daily activities including pain with activity and lifting heavy objects. Educated patient regarding plan of care and answered all patient questions to patient satisfaction. Patient would  benefit from skilled PT interventions to address above impairments, achieve goals, and to maximize function. Thank you for the referral.    Impairments: abnormal muscle firing, abnormal or restricted ROM, abnormal movement, activity intolerance, impaired physical strength and pain with function     Prognosis: good    Goals  Impairment Goals: 4-6 weeks  - Patient to decrease pain to 0/10  - Patient to increase hip strength to 4+/5 throughout    Functional Goals: by discharge  - Patient to discharge to independent [facility]  - Patient to return to prior level of function  - Patient to improve tolerance to refereeing    Plan  Patient would benefit from: skilled physical therapy  Planned modality interventions: cryotherapy, TENS and thermotherapy: hydrocollator packs  Planned therapy interventions: flexibility, home exercise program, joint mobilization, manual therapy, neuromuscular re-education, patient education, strengthening, stretching, therapeutic activities, therapeutic exercise and functional ROM exercises  Frequency: 1x week  Duration in weeks: 6  Treatment plan discussed with: patient      Subjective Evaluation    History of Present Illness  Mechanism of injury: HISTORY OF PRESENT ILLNESS: Patient notes chronic history of bilateral knee pain. He recently underwent CSI of L knee with positive response. 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 He underwent x-rays which showed bilateral OA L > R. Patient notes occasional catching with pivoting in his L knee. He was told he is likely headed for TKA at some point.   PRIOR TREATMENT: CSI  AGGRAVATING FACTORS: running, refereeing basketball  EASING FACTORS: ice, Ibuprofen  WORK: waste  (walking) and high school   IMAGING: x-rays notable for OA  FUNCTIONAL LIMITATIONS: pain with activity and lifting heavy objects  SUBJECTIVE FUNCTIONAL LEVEL: 95%  PATIENT GOAL: I'd like to reduce  my pain with running  Pain  Current pain ratin  At best pain ratin  At worst pain ratin  Location: B/L knees        Objective     Active Range of Motion   Left Knee   Flexion: 142 degrees   Extension: -3 degrees     Right Knee   Flexion: 145 degrees   Extension: -1 degrees     Passive Range of Motion   Left Knee   Extension: 0 degrees     Right Knee   Extension: 0 degrees     Strength/Myotome Testing     Left Hip   Planes of Motion   Flexion: 5  Extension: 3+  Abduction: 4  External rotation: 5  Internal rotation: 5    Right Hip   Planes of Motion   Flexion: 5  Extension: 4  Abduction: 4  External rotation: 5  Internal rotation: 5    Left Knee   Flexion: 5  Extension: 5    Right Knee   Flexion: 5  Extension: 5    Left Ankle/Foot   Dorsiflexion: 5    Right Ankle/Foot   Dorsiflexion: 5    Additional Strength Details  R SLR: 4+  L SLR: 4+    Tests     Left Hip   Positive Ely's.     Right Hip   Positive Ely's.       Flowsheet Rows      Flowsheet Row Most Recent Value   PT/OT G-Codes    Current Score 77   Projected Score 77               Diagnosis: B/L knee OA   Precautions: none   Primary impairments: hip abduction and extension strength deficits   *asterisks by exercise = given for HEP           Manuals                                                There Ex        Rec bike        Prone quad stretch                                                                Neuro Re-Ed        Bridges on ball        S/L hip abd *  X 15 B       S/L hip circles        S/L clamshells        Side plank on knees        Prone hip ext *  X 15 B       Prone kickbacks        U/L leg press        Sidestepping and waddle walks        Standing hip abd iso                                        Re-evaluation             Ther Act/Gait                                         Modalities

## 2024-01-11 ENCOUNTER — OFFICE VISIT (OUTPATIENT)
Dept: PHYSICAL THERAPY | Facility: REHABILITATION | Age: 64
End: 2024-01-11
Payer: COMMERCIAL

## 2024-01-11 DIAGNOSIS — M25.561 CHRONIC PAIN OF BOTH KNEES: Primary | ICD-10-CM

## 2024-01-11 DIAGNOSIS — G89.29 CHRONIC PAIN OF BOTH KNEES: Primary | ICD-10-CM

## 2024-01-11 DIAGNOSIS — M25.562 CHRONIC PAIN OF BOTH KNEES: Primary | ICD-10-CM

## 2024-01-11 PROCEDURE — 97112 NEUROMUSCULAR REEDUCATION: CPT | Performed by: PHYSICAL THERAPIST

## 2024-01-11 PROCEDURE — 97110 THERAPEUTIC EXERCISES: CPT | Performed by: PHYSICAL THERAPIST

## 2024-01-11 NOTE — PROGRESS NOTES
"Daily Note     Today's date: 2024  Patient name: Wayne Mosley  : 1960  MRN: [id number]  Referring provider: Valerie Bailey DO  Dx:   Encounter Diagnosis     ICD-10-CM    1. Chronic pain of both knees  M25.561     M25.562     G89.29           Start Time: 1745  Stop Time: 1830  Total time in clinic (min): 45 minutes    Subjective: Patient reports he felt good after the examination and denies any setbacks since      Objective: See treatment diary below      Assessment: Tolerated treatment well with very good response to initial strength progressions. Limited excursion L compared to R during prone kickbacks with increased difficulty reported. Updated HEP to include band sidestepping and provided green band for home use. Patient demonstrated fatigue post treatment, exhibited good technique with therapeutic exercises, and would benefit from continued PT      Plan: Continue per plan of care.      Diagnosis: B/L knee OA   Precautions: none   Primary impairments: hip abduction and extension strength deficits   *asterisks by exercise = given for HEP          Manuals                                                There Ex        Rec bike   L2 x 6'      Prone quad stretch *   30\" x 3                                                              Neuro Re-Ed        Bridges on ball        S/L hip abd *  X 15 B  X 20 B      S/L hip circles        S/L clamshells        Side plank on knees        Prone hip ext *  X 15 B  X 20 B      Prone kickbacks   X 20 B      U/L leg press   75 lbs x 20 B      Sidestepping and waddle walks *   Green x 2 laps      Monster walks   Green x 2 laps      Standing hip abd iso                                        Re-evaluation             Ther Act/Gait                                         Modalities                                                          "

## 2024-01-16 ENCOUNTER — OFFICE VISIT (OUTPATIENT)
Dept: PHYSICAL THERAPY | Facility: REHABILITATION | Age: 64
End: 2024-01-16
Payer: COMMERCIAL

## 2024-01-16 DIAGNOSIS — M25.562 CHRONIC PAIN OF BOTH KNEES: Primary | ICD-10-CM

## 2024-01-16 DIAGNOSIS — M25.561 CHRONIC PAIN OF BOTH KNEES: Primary | ICD-10-CM

## 2024-01-16 DIAGNOSIS — G89.29 CHRONIC PAIN OF BOTH KNEES: Primary | ICD-10-CM

## 2024-01-16 PROCEDURE — 97110 THERAPEUTIC EXERCISES: CPT | Performed by: PHYSICAL THERAPIST

## 2024-01-16 PROCEDURE — 97112 NEUROMUSCULAR REEDUCATION: CPT | Performed by: PHYSICAL THERAPIST

## 2024-01-16 NOTE — PROGRESS NOTES
"Daily Note     Today's date: 2024  Patient name: Wayne Mosley  : 1960  MRN: [id number]  Referring provider: Valerie Bailey DO  Dx:   Encounter Diagnosis     ICD-10-CM    1. Chronic pain of both knees  M25.561     M25.562     G89.29           Start Time: 0815  Stop Time: 0900  Total time in clinic (min): 45 minutes    Subjective: Patient reports he felt some muscle soreness after last visit but no pain aggravation      Objective: See treatment diary below      Assessment: Tolerated treatment well. Updated HEP to include side plank on knees. Patient challenged with core and hip strengthening with slightly more difficulty L compared to R. Initiated standing hip abduction isometric. Patient demonstrated fatigue post treatment, exhibited good technique with therapeutic exercises, and would benefit from continued PT      Plan: Continue per plan of care.      Diagnosis: B/L knee OA   Precautions: none   Primary impairments: hip abduction and extension strength deficits   *asterisks by exercise = given for HEP         Manuals                                                There Ex        Upright bike   L2 x 6'  L3 x 8'     Prone quad stretch *   30\" x 3  HEP                                                             Neuro Re-Ed        Bridges on ball    5\" x 15     S/L hip abd *  X 15 B  X 20 B  HEP     S/L hip circles    X 15 cw/ccw     S/L clamshells    Blue x 20 B     Side plank on knees *    15\" x 3 B     Prone hip ext *  X 15 B  X 20 B  HEP     Prone kickbacks   X 20 B  X 20 B     U/L leg press   75 lbs x 20 B  75 lbs x 20 B  Increase NV    Sidestepping and waddle walks *   Green x 2 laps  Green x 2 laps     Monster walks   Green x 2 laps  Green x 2 laps     Standing hip abd iso    5\" x 10 B                                     Re-evaluation             Ther Act/Gait                                         Modalities                                                            "

## 2024-01-23 ENCOUNTER — OFFICE VISIT (OUTPATIENT)
Dept: PHYSICAL THERAPY | Facility: REHABILITATION | Age: 64
End: 2024-01-23
Payer: COMMERCIAL

## 2024-01-23 ENCOUNTER — TELEPHONE (OUTPATIENT)
Age: 64
End: 2024-01-23

## 2024-01-23 DIAGNOSIS — M25.561 CHRONIC PAIN OF BOTH KNEES: Primary | ICD-10-CM

## 2024-01-23 DIAGNOSIS — M25.562 CHRONIC PAIN OF BOTH KNEES: Primary | ICD-10-CM

## 2024-01-23 DIAGNOSIS — G89.29 CHRONIC PAIN OF BOTH KNEES: Primary | ICD-10-CM

## 2024-01-23 PROCEDURE — 97112 NEUROMUSCULAR REEDUCATION: CPT | Performed by: PHYSICAL THERAPIST

## 2024-01-23 PROCEDURE — 97110 THERAPEUTIC EXERCISES: CPT | Performed by: PHYSICAL THERAPIST

## 2024-01-23 NOTE — PROGRESS NOTES
"Daily Note     Today's date: 2024  Patient name: Wayne Mosley  : 1960  MRN: [id number]  Referring provider: Valerie Bailey DO  Dx:   Encounter Diagnosis     ICD-10-CM    1. Chronic pain of both knees  M25.561     M25.562     G89.29           Start Time: 0815  Stop Time: 0900  Total time in clinic (min): 45 minutes    Subjective: Patient followed up with physician and would like to complete one additional visit followed by discharge to independent [facility]. He received MRI results which showed tricompartmental OA      Objective: See treatment diary below      Assessment: Tolerated treatment well. Discussed rehabilitation and prognosis following TKA as patient has surgical consult scheduled. Initiated unilateral squat to chair with foam with no adverse responses. Updated and reviewed HEP. Patient demonstrated fatigue post treatment, exhibited good technique with therapeutic exercises, and would benefit from continued PT      Plan: Continue per plan of care.      Diagnosis: B/L knee OA   Precautions: none   Primary impairments: hip abduction and extension strength deficits   *asterisks by exercise = given for HEP        Manuals                                                There Ex        Upright bike   L2 x 6'  L3 x 8'  L3 x 8'    Prone quad stretch *   30\" x 3  HEP                                                             Neuro Re-Ed        Bridges on ball    5\" x 15  5\" x 20    S/L hip abd *  X 15 B  X 20 B  HEP     S/L hip circles *    X 15 cw/ccw  X 15 cw/ccw    S/L clamshells    Blue x 20 B     Side plank on knees *    15\" x 3 B  HEP    Prone hip ext *  X 15 B  X 20 B  HEP     Prone kickbacks   X 20 B  X 20 B  X 20 B    U/L leg press   75 lbs x 20 B  75 lbs x 20 B  85 lbs x 20 B    Sidestepping and waddle walks *   Green x 2 laps  Green x 2 laps  Blue x 2 laps    Monster walks *   Green x 2 laps  Green x 2 laps  Blue x 2 laps    Standing hip abd iso    5\" x 10 B  5\" x 10 B    U/L " squat to chair with foam     X 15 B                            Re-evaluation             Ther Act/Gait                                         Modalities

## 2024-01-23 NOTE — TELEPHONE ENCOUNTER
Caller: Wayne Mosley    Doctor: Shana/ Mahamed    Reason for call: Wayne has an infected 4th toe on right foot.  And it's red and swollen and painful.  Can we please force him in?  Thank you.    Call back#: [phone]

## 2024-01-24 ENCOUNTER — OFFICE VISIT (OUTPATIENT)
Dept: PODIATRY | Facility: CLINIC | Age: 64
End: 2024-01-24
Payer: COMMERCIAL

## 2024-01-24 VITALS
WEIGHT: 158 LBS | HEIGHT: 70 IN | HEART RATE: 62 BPM | BODY MASS INDEX: 22.62 KG/M2 | DIASTOLIC BLOOD PRESSURE: 65 MMHG | SYSTOLIC BLOOD PRESSURE: 129 MMHG

## 2024-01-24 DIAGNOSIS — L60.0 INGROWN TOENAIL: Primary | ICD-10-CM

## 2024-01-24 DIAGNOSIS — M79.674 PAIN IN TOE OF RIGHT FOOT: ICD-10-CM

## 2024-01-24 PROCEDURE — 11730 AVULSION NAIL PLATE SIMPLE 1: CPT | Performed by: PODIATRIST

## 2024-01-24 PROCEDURE — 99212 OFFICE O/P EST SF 10 MIN: CPT | Performed by: PODIATRIST

## 2024-01-24 RX ORDER — LIDOCAINE HYDROCHLORIDE 10 MG/ML
1 INJECTION, SOLUTION EPIDURAL; INFILTRATION; INTRACAUDAL; PERINEURAL ONCE
Status: COMPLETED | OUTPATIENT
Start: 2024-01-24 | End: 2024-01-24

## 2024-01-24 RX ORDER — LIDOCAINE HYDROCHLORIDE AND EPINEPHRINE 10; 10 MG/ML; UG/ML
1 INJECTION, SOLUTION INFILTRATION; PERINEURAL ONCE
Status: COMPLETED | OUTPATIENT
Start: 2024-01-24 | End: 2024-01-24

## 2024-01-24 RX ADMIN — LIDOCAINE HYDROCHLORIDE AND EPINEPHRINE 1 ML: 10; 10 INJECTION, SOLUTION INFILTRATION; PERINEURAL at 16:42

## 2024-01-24 RX ADMIN — LIDOCAINE HYDROCHLORIDE 1 ML: 10 INJECTION, SOLUTION EPIDURAL; INFILTRATION; INTRACAUDAL; PERINEURAL at 16:42

## 2024-01-24 NOTE — PROGRESS NOTES
Patient presents with a painful ingrown nail affecting the lateral nail border of the right fourth toe.  Patient had similar problems along the lateral nail border of the right third toe that responded well to partial avulsion.    Anesthesia via 3 cc of a 1: 1 mixture of 1% Xylocaine with epinephrine and 1% Xylocaine plain.  Betadine prep of toe.  Lateral nail border of the right fourth toe was avulsed to the eponychium.  Bacitracin dressing applied.  Patient to soak twice daily in warm water followed by Neosporin dressing.    Patient is currently on his second pulsed dosing for mycotic toenails.    Nail removal    Date/Time: 1/24/2024 4:15 PM    Performed by: Tray Saldana DPM  Authorized by: Tray Saldana DPM    Patient location:  ClinicUniversal Protocol:  Consent: Verbal consent obtained.  Risks and benefits: risks, benefits and alternatives were discussed  Consent given by: patient    Location:     Foot:  R fourth toe  Pre-procedure details:     Skin preparation:  Betadine  Anesthesia (see MAR for exact dosages):     Anesthesia method:  Nerve block    Block needle gauge:  25 G    Block anesthetic:  Lidocaine 1% WITH epi and lidocaine 1% w/o epi    Block injection procedure:  Anatomic landmarks identified    Block outcome:  Anesthesia achieved  Nail Removal:     Nail removed:  Partial    Nail side:  Lateral    Nail bed sutured: no    Ingrown nail:     Wedge excision of skin: no      Nail matrix removed or ablated:  None  Post-procedure details:     Dressing:  4x4 sterile gauze, antibiotic ointment and gauze roll    Patient tolerance of procedure:  Tolerated well, no immediate complications

## 2024-01-29 ENCOUNTER — TELEPHONE (OUTPATIENT)
Age: 64
End: 2024-01-29

## 2024-01-29 NOTE — TELEPHONE ENCOUNTER
Patient unsure if @f 4 th toe is infected. Its red and hurts. He has soaked it and put Neosporin on with little relieve. Please call patient and advise. [phone].

## 2024-01-30 ENCOUNTER — APPOINTMENT (OUTPATIENT)
Dept: PHYSICAL THERAPY | Facility: REHABILITATION | Age: 64
End: 2024-01-30
Payer: COMMERCIAL

## 2024-01-30 ENCOUNTER — TELEPHONE (OUTPATIENT)
Dept: PODIATRY | Facility: CLINIC | Age: 64
End: 2024-01-30

## 2024-01-30 NOTE — TELEPHONE ENCOUNTER
This message was supposed to be routed to Dr. Saldana. Per pt, he was seen by Dr. Saldana last Wednesday for an ingrown toe nail that pt now thinks may be infected. Pt advised to call Dr. Saldana's office to schedule a f/u.

## 2024-01-31 ENCOUNTER — OFFICE VISIT (OUTPATIENT)
Dept: PODIATRY | Facility: CLINIC | Age: 64
End: 2024-01-31
Payer: COMMERCIAL

## 2024-01-31 VITALS
WEIGHT: 158 LBS | BODY MASS INDEX: 22.62 KG/M2 | DIASTOLIC BLOOD PRESSURE: 75 MMHG | SYSTOLIC BLOOD PRESSURE: 121 MMHG | HEART RATE: 64 BPM | HEIGHT: 70 IN

## 2024-01-31 DIAGNOSIS — L60.0 INGROWN TOENAIL: Primary | ICD-10-CM

## 2024-01-31 PROCEDURE — 99212 OFFICE O/P EST SF 10 MIN: CPT | Performed by: PODIATRIST

## 2024-01-31 PROCEDURE — 1159F MED LIST DOCD IN RCRD: CPT | Performed by: PODIATRIST

## 2024-01-31 PROCEDURE — 1160F RVW MEDS BY RX/DR IN RCRD: CPT | Performed by: PODIATRIST

## 2024-01-31 RX ORDER — AZITHROMYCIN 250 MG/1
TABLET, FILM COATED ORAL
Qty: 6 TABLET | Refills: 0 | Status: SHIPPED | OUTPATIENT
Start: 2024-01-31 | End: 2024-02-04

## 2024-01-31 NOTE — PROGRESS NOTES
Patient presents for assessment of right fourth toe.  He states that he has been having surprising discomfort at the surgical site.    On exam, mild erythema noted along the lateral nail border of the right fourth toe.  There is no drainage.  Pain is mild to moderate in intensity.    Treatment: As precaution, patient placed on a Z-Lv.  He will contact me in a week if he still having difficulty.

## 2024-02-06 ENCOUNTER — OFFICE VISIT (OUTPATIENT)
Dept: PHYSICAL THERAPY | Facility: REHABILITATION | Age: 64
End: 2024-02-06
Payer: COMMERCIAL

## 2024-02-06 ENCOUNTER — APPOINTMENT (OUTPATIENT)
Dept: PHYSICAL THERAPY | Facility: REHABILITATION | Age: 64
End: 2024-02-06
Payer: COMMERCIAL

## 2024-02-06 DIAGNOSIS — M25.562 CHRONIC PAIN OF BOTH KNEES: Primary | ICD-10-CM

## 2024-02-06 DIAGNOSIS — M25.561 CHRONIC PAIN OF BOTH KNEES: Primary | ICD-10-CM

## 2024-02-06 DIAGNOSIS — G89.29 CHRONIC PAIN OF BOTH KNEES: Primary | ICD-10-CM

## 2024-02-06 PROCEDURE — 97110 THERAPEUTIC EXERCISES: CPT | Performed by: PHYSICAL THERAPIST

## 2024-02-06 PROCEDURE — 97112 NEUROMUSCULAR REEDUCATION: CPT | Performed by: PHYSICAL THERAPIST

## 2024-02-06 PROCEDURE — 97140 MANUAL THERAPY 1/> REGIONS: CPT | Performed by: PHYSICAL THERAPIST

## 2024-02-06 NOTE — PROGRESS NOTES
"Daily Note     Today's date: 2024  Patient name: Wayne Mosley  : 1960  MRN: [id number]  Referring provider: Valerie Bailey DO  Dx:   Encounter Diagnosis     ICD-10-CM    1. 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 He has held on his strengthening as a result of the soreness. He has L TKA scheduled for 24      Objective: See treatment diary below      Assessment: Tolerated treatment well. Initiated treatment directed at L Achilles due to symptoms consistent with Achilles tendinopathy. Positive response to IASTM with reduced symptoms immediately following. Completed gentle gastrocnemius stretching and eccentrics and updated HEP to include these exercises. Patient demonstrated fatigue post treatment, exhibited good technique with therapeutic exercises, and would benefit from continued PT. Plan to focus continued interventions on bilateral hip strengthening as well as Achilles tendinopathy      Plan: Continue per plan of care.      Diagnosis: B/L knee OA   Precautions: none   Primary impairments: hip abduction and extension strength deficits   *asterisks by exercise = given for HEP       Manuals        L Achilles IASTM      15'   L gastroc stretching                                There Ex        Upright bike   L2 x 6'  L3 x 8'  L3 x 8'  L3 x 8'   Prone quad stretch *   30\" x 3  HEP     Step gastroc stretch      30\" x 3                                           Patient education      Rehab following TKA, stair navigation   Neuro Re-Ed        Bridges on ball    5\" x 15  5\" x 20    S/L hip abd *  X 15 B  X 20 B  HEP     S/L hip circles *    X 15 cw/ccw  X 15 cw/ccw    S/L clamshells    Blue x 20 B     Side plank on knees *    15\" x 3 B  HEP  " "  Prone hip ext *  X 15 B  X 20 B  HEP     Prone kickbacks   X 20 B  X 20 B  X 20 B    U/L leg press   75 lbs x 20 B  75 lbs x 20 B  85 lbs x 20 B    Sidestepping and waddle walks *   Green x 2 laps  Green x 2 laps  Blue x 2 laps    Monster walks *   Green x 2 laps  Green x 2 laps  Blue x 2 laps    Standing hip abd iso    5\" x 10 B  5\" x 10 B    U/L squat to chair with foam     X 15 B    Achilles eccentrics *      X 30                           Re-evaluation             Ther Act/Gait                                         Modalities                                                                "

## 2024-02-12 ENCOUNTER — OFFICE VISIT (OUTPATIENT)
Dept: PHYSICAL THERAPY | Facility: REHABILITATION | Age: 64
End: 2024-02-12
Payer: COMMERCIAL

## 2024-02-12 DIAGNOSIS — S86.012D STRAIN OF LEFT ACHILLES TENDON, SUBSEQUENT ENCOUNTER: ICD-10-CM

## 2024-02-12 DIAGNOSIS — M25.561 CHRONIC PAIN OF BOTH KNEES: Primary | ICD-10-CM

## 2024-02-12 DIAGNOSIS — M25.562 CHRONIC PAIN OF BOTH KNEES: Primary | ICD-10-CM

## 2024-02-12 DIAGNOSIS — G89.29 CHRONIC PAIN OF BOTH KNEES: Primary | ICD-10-CM

## 2024-02-12 PROCEDURE — 97112 NEUROMUSCULAR REEDUCATION: CPT | Performed by: PHYSICAL THERAPIST

## 2024-02-12 PROCEDURE — 97110 THERAPEUTIC EXERCISES: CPT | Performed by: PHYSICAL THERAPIST

## 2024-02-12 PROCEDURE — 97140 MANUAL THERAPY 1/> REGIONS: CPT | Performed by: PHYSICAL THERAPIST

## 2024-02-12 NOTE — PROGRESS NOTES
"Daily Note     Today's date: 2024  Patient name: Wayne Mosley  : 1960  MRN: [id number]  Referring provider: Valerie Bailey DO  Dx:   Encounter Diagnosis     ICD-10-CM    1. Chronic pain of both knees  M25.561     M25.562     G89.29       2. 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 Positive response to IASTM to Achilles tendon followed by gastrocnemius stretching with improved mobility immediately following. Initiated leg press Achilles eccentrics with no symptom aggravation. Patient exhibited good technique with therapeutic exercises and would benefit from continued PT. Continue to focus on Achilles tendinopathy pending symptom response      Plan: Continue per plan of care.      Diagnosis: B/L knee OA   Precautions: none   Primary impairments: hip abduction and extension strength deficits   *asterisks by exercise = given for HEP     2/6   Manuals        L Achilles IASTM  15'     15'   L gastroc stretching  8'                               There Ex        Upright bike  L3 x 8'  L2 x 6'  L3 x 8'  L3 x 8'  L3 x 8'   Prone quad stretch *   30\" x 3  HEP     Step gastroc stretch      30\" x 3                                           Patient education      Rehab following TKA, stair navigation   Neuro Re-Ed        Bridges on ball    5\" x 15  5\" x 20    S/L hip abd *   X 20 B  HEP     S/L hip circles *    X 15 cw/ccw  X 15 cw/ccw    S/L clamshells    Blue x 20 B     Side plank on knees *    15\" x 3 B  HEP    Prone hip ext *   X 20 B  HEP     Prone kickbacks   X 20 B  X 20 B  X 20 B    U/L leg press   75 lbs x 20 B  75 lbs x 20 B  85 lbs x 20 B  " "  Sidestepping and waddle walks *   Green x 2 laps  Green x 2 laps  Blue x 2 laps    Monster walks *   Green x 2 laps  Green x 2 laps  Blue x 2 laps    Standing hip abd iso    5\" x 10 B  5\" x 10 B    U/L squat to chair with foam     X 15 B    Achilles eccentrics *  X 30     X 30   Leg press Achilles eccentrics  65 lbs x 30                       Re-evaluation            Ther Act/Gait                                      Modalities                                                                "

## 2024-02-13 ENCOUNTER — APPOINTMENT (OUTPATIENT)
Dept: PHYSICAL THERAPY | Facility: REHABILITATION | Age: 64
End: 2024-02-13
Payer: COMMERCIAL

## 2024-02-16 ENCOUNTER — NEW PATIENT (OUTPATIENT)
Dept: URBAN - METROPOLITAN AREA CLINIC 6 | Facility: CLINIC | Age: 64
End: 2024-02-16

## 2024-02-16 DIAGNOSIS — H04.123: ICD-10-CM

## 2024-02-16 DIAGNOSIS — H11.32: ICD-10-CM

## 2024-02-16 PROCEDURE — 92002 INTRM OPH EXAM NEW PATIENT: CPT

## 2024-02-16 ASSESSMENT — TONOMETRY
OS_IOP_MMHG: 16
OD_IOP_MMHG: 15

## 2024-02-16 ASSESSMENT — VISUAL ACUITY
OD_PH: 20/40
OD_CC: 20/50
OS_CC: 20/30-1

## 2024-02-19 ENCOUNTER — APPOINTMENT (OUTPATIENT)
Dept: PHYSICAL THERAPY | Facility: REHABILITATION | Age: 64
End: 2024-02-19
Payer: COMMERCIAL

## 2024-02-26 ENCOUNTER — OFFICE VISIT (OUTPATIENT)
Dept: PHYSICAL THERAPY | Facility: REHABILITATION | Age: 64
End: 2024-02-26
Payer: COMMERCIAL

## 2024-02-26 DIAGNOSIS — M25.561 CHRONIC PAIN OF BOTH KNEES: Primary | ICD-10-CM

## 2024-02-26 DIAGNOSIS — M25.562 CHRONIC PAIN OF BOTH KNEES: Primary | ICD-10-CM

## 2024-02-26 DIAGNOSIS — G89.29 CHRONIC PAIN OF BOTH KNEES: Primary | ICD-10-CM

## 2024-02-26 DIAGNOSIS — S86.012D STRAIN OF LEFT ACHILLES TENDON, SUBSEQUENT ENCOUNTER: ICD-10-CM

## 2024-02-26 PROCEDURE — 97110 THERAPEUTIC EXERCISES: CPT | Performed by: PHYSICAL THERAPIST

## 2024-02-26 PROCEDURE — 97140 MANUAL THERAPY 1/> REGIONS: CPT | Performed by: PHYSICAL THERAPIST

## 2024-02-26 NOTE — PROGRESS NOTES
"Daily Note     Today's date: 2024  Patient name: Wayne Mosley  : 1960  MRN: [id number]  Referring provider: Valerie Bailey DO  Dx:   Encounter Diagnosis     ICD-10-CM    1. Chronic pain of both knees  M25.561     M25.562     G89.29       2. Strain of left Achilles tendon, subsequent encounter  S86.012D           Start Time: 0815  Stop Time: 0900  Total time in clinic (min): 45 minutes    Subjective: Patient reports his Achilles has still been bothering him especially when going down the stairs first thing in the morning. 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 Increased resistance with unilateral leg press eccentrics. Patient demonstrated fatigue post treatment, exhibited good technique with therapeutic exercises, and would benefit from continued PT      Plan: Continue per plan of care.      Diagnosis: B/L knee OA   Precautions: none   Primary impairments: hip abduction and extension strength deficits   *asterisks by exercise = given for HEP     2   Manuals        L Achilles IASTM  15'  15'    15'   L gastroc stretching  8'  8'                              There Ex        Upright bike  L3 x 8'  L3 x 8'  L3 x 8'  L3 x 8'  L3 x 8'   Prone quad stretch *    HEP     Step gastroc stretch      30\" x 3                                           Patient education   Prognosis, TKA     Rehab following TKA, stair navigation   Neuro Re-Ed        Bridges on ball    5\" x 15  5\" x 20    S/L hip abd *    HEP     S/L hip circles *    X 15 cw/ccw  X 15 cw/ccw    S/L clamshells    Blue x 20 B     Side plank on knees *    15\" x 3 B  HEP    Prone hip ext *    HEP     Prone kickbacks    X 20 B  X 20 B    U/L leg press    75 lbs x 20 B  85 lbs x 20 B    Sidestepping and waddle walks *    " "Green x 2 laps  Blue x 2 laps    Monster walks *    Green x 2 laps  Blue x 2 laps    Standing hip abd iso    5\" x 10 B  5\" x 10 B    U/L squat to chair with foam     X 15 B    Achilles eccentrics *  X 30  HEP    X 30   Leg press Achilles eccentrics  65 lbs x 30  75 lbs x 30                      Re-evaluation           Ther Act/Gait                                   Modalities                                                                "

## 2024-03-04 ENCOUNTER — OFFICE VISIT (OUTPATIENT)
Dept: PHYSICAL THERAPY | Facility: REHABILITATION | Age: 64
End: 2024-03-04
Payer: COMMERCIAL

## 2024-03-04 ENCOUNTER — RA CDI HCC (OUTPATIENT)
Dept: OTHER | Facility: HOSPITAL | Age: 64
End: 2024-03-04

## 2024-03-04 DIAGNOSIS — M25.561 CHRONIC PAIN OF BOTH KNEES: Primary | ICD-10-CM

## 2024-03-04 DIAGNOSIS — M25.562 CHRONIC PAIN OF BOTH KNEES: Primary | ICD-10-CM

## 2024-03-04 DIAGNOSIS — S86.012D STRAIN OF LEFT ACHILLES TENDON, SUBSEQUENT ENCOUNTER: ICD-10-CM

## 2024-03-04 DIAGNOSIS — G89.29 CHRONIC PAIN OF BOTH KNEES: Primary | ICD-10-CM

## 2024-03-04 PROCEDURE — 97112 NEUROMUSCULAR REEDUCATION: CPT

## 2024-03-04 PROCEDURE — 97110 THERAPEUTIC EXERCISES: CPT

## 2024-03-04 PROCEDURE — 97140 MANUAL THERAPY 1/> REGIONS: CPT

## 2024-03-04 NOTE — PROGRESS NOTES
"HCC coding opportunities          Chart Reviewed number of suggestions sent to Provider: 1     Patients Insurance        Commercial Insurance: [employer]       Refer to 9/18/2023   \"1. Prostate cancer (HCC)  Assessment & Plan:  History of prostate cancer diagnosed in September 2021.  Patient had robotic prostatectomy November 2021 at [facility].  Patient was found to have microinvasion of the bladder neck at that time, which was excised.  He continues to do well.  He has not experienced incontinence or ED.  His PSAs been undetectable.  Continue regular follow-up with urologist at [place]   \"    Please review if this is a \"History of\" or \"resolved\" condition , see below guidelines, and update the problem list as applicable     Per Coding guidelines:   If the primary site of the malignancy as previously excised or eradicated by treatment and the original primary site has not recurred and is no longer under treatment, code the previous primary site as \"personal (past) history of malignant neoplasm,\" using the appropriate subcategory code under Z85.0-Z85.85 Code any mention of current secondary sites.       If the patient is still under active treatment for malignancy of primary site, either radiation or chemotherapy, retain the code for malignancy of primary site. The patient would not be receiving radiation therapy or chemotherapy directed at the primary site unless further treatments were needed.   "

## 2024-03-04 NOTE — PROGRESS NOTES
This office note has been dictated.         I spent a total of 38 minutes on the day of the visit.  This includes preparing to see patient (review of nursing notes/phone calls); obtaining and/or reviewing seperately obtained history; performing a medically appropriate examination and /or evaluation; counseling and educating family/caregiver/parent; ordering medications, labs, or tests; documenting clinical and other information in the electronic medical record and care coordination; and referral to other medical care if needed.      "Daily Note     Today's date: 3/4/2024  Patient name: Wayne Mosley  : 1960  MRN: [id number]  Referring provider: Valerie Bailey DO  Dx:   Encounter Diagnosis     ICD-10-CM    1. Chronic pain of both knees  M25.561     M25.562     G89.29       2. 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 Patient notes B knees symptoms have been tolerable and that L achilles symptoms > B knee symptoms.        Objective: See treatment diary below      Assessment: Tolerated treatment well. Patient did not experience any worsening of symptoms throughout today's session. Patient continues to respond favorably to IASTM to L achilles and noted some relief/improvements in stiffness post treatment. Patient displayed good eccentric control of L achilles/ankle with all exercises performed today. Reviewed proper stair navigation post TKA during today's session and emphasized \"up w/ affected L side, down with unaffected R side\" with verbal understanding expressed by patient. Patient has met the functional goals set during IE for B knees and is ready to be discharged to [facility] as per primary PT. Patient exhibited good technique with therapeutic exercises and would benefit from continued PT      Plan:  Discharge as per primary PT.     Diagnosis: B/L knee OA   Precautions: none   Primary impairments: hip abduction and extension strength deficits   *asterisks by exercise = given for HEP    2/12 2/26 3/4 1/23 2/6   Manuals        L Achilles IASTM  15'  15' 15'   15'   L gastroc stretching  8'  8' 8'                             There Ex        Upright bike  L3 x 8'  L3 x 8' L3 x 8'  L3 x 8'  L3 x 8'   Prone quad stretch *        Step gastroc stretch      30\" x 3                      " "                     Patient education   Prognosis, TKA  Reviewed proper stair navigation post TKA   Rehab following TKA, stair navigation   Neuro Re-Ed        Bridges on ball     5\" x 20    S/L hip abd *        S/L hip circles *     X 15 cw/ccw    S/L clamshells        Side plank on knees *     HEP    Prone hip ext *        Prone kickbacks     X 20 B    U/L leg press     85 lbs x 20 B    Sidestepping and waddle walks *     Blue x 2 laps    Monster walks *     Blue x 2 laps    Standing hip abd iso     5\" x 10 B    U/L squat to chair with foam     X 15 B    Achilles eccentrics *  X 30  HEP X 30   X 30   Leg press Achilles eccentrics  65 lbs x 30  75 lbs x 30 75 lbs x 30                     Re-evaluation           Ther Act/Gait                                   Modalities                                                                "

## 2024-03-11 ENCOUNTER — TELEPHONE (OUTPATIENT)
Age: 64
End: 2024-03-11

## 2024-03-11 DIAGNOSIS — E78.2 MIXED HYPERLIPIDEMIA: Primary | ICD-10-CM

## 2024-03-11 DIAGNOSIS — R73.03 PREDIABETES: ICD-10-CM

## 2024-03-11 NOTE — TELEPHONE ENCOUNTER
Please notify patient that [facility] lab orders were placed.  What lab will he be using?  We will need to fax the order to them.

## 2024-03-11 NOTE — TELEPHONE ENCOUNTER
Last visit 09/18/2023  Next visit 03/18/2024 with Dr Chen    Patient stated he is coming in for his 6 month follow up and would like scripts for blood work to be placed. Patient will be going to [facility]. Patient would like a return call once order has been placed. Patient can be contacted at [phone]. Please advise.

## 2024-03-15 LAB
ALBUMIN SERPL-MCNC: 3.9 G/DL (ref 3.6–5.1)
ALBUMIN/GLOB SERPL: 1.3 (CALC) (ref 1–2.5)
ALP SERPL-CCNC: 34 U/L (ref 35–144)
ALT SERPL-CCNC: 18 U/L (ref 9–46)
AST SERPL-CCNC: 19 U/L (ref 10–35)
BILIRUB SERPL-MCNC: 0.5 MG/DL (ref 0.2–1.2)
BUN SERPL-MCNC: 27 MG/DL (ref 7–25)
BUN/CREAT SERPL: 29 (CALC) (ref 6–22)
CALCIUM SERPL-MCNC: 9 MG/DL (ref 8.6–10.3)
CHLORIDE SERPL-SCNC: 109 MMOL/L (ref 98–110)
CHOLEST SERPL-MCNC: 170 MG/DL
CHOLEST/HDLC SERPL: 5 (CALC)
CO2 SERPL-SCNC: 27 MMOL/L (ref 20–32)
CREAT SERPL-MCNC: 0.93 MG/DL (ref 0.7–1.35)
GFR/BSA.PRED SERPLBLD CYS-BASED-ARV: 91 ML/MIN/1.73M2
GLOBULIN SER CALC-MCNC: 3 G/DL (CALC) (ref 1.9–3.7)
GLUCOSE SERPL-MCNC: 102 MG/DL (ref 65–99)
HBA1C MFR BLD: 5.5 % OF TOTAL HGB
HDLC SERPL-MCNC: 34 MG/DL
LDLC SERPL CALC-MCNC: 116 MG/DL (CALC)
NONHDLC SERPL-MCNC: 136 MG/DL (CALC)
POTASSIUM SERPL-SCNC: 4.5 MMOL/L (ref 3.5–5.3)
PROT SERPL-MCNC: 6.9 G/DL (ref 6.1–8.1)
SODIUM SERPL-SCNC: 142 MMOL/L (ref 135–146)
TRIGL SERPL-MCNC: 97 MG/DL

## 2024-03-18 ENCOUNTER — OFFICE VISIT (OUTPATIENT)
Dept: FAMILY MEDICINE CLINIC | Facility: CLINIC | Age: 64
End: 2024-03-18
Payer: COMMERCIAL

## 2024-03-18 VITALS
SYSTOLIC BLOOD PRESSURE: 110 MMHG | HEIGHT: 70 IN | WEIGHT: 156 LBS | HEART RATE: 86 BPM | OXYGEN SATURATION: 98 % | BODY MASS INDEX: 22.33 KG/M2 | TEMPERATURE: 98 F | DIASTOLIC BLOOD PRESSURE: 80 MMHG

## 2024-03-18 DIAGNOSIS — M17.12 ARTHRITIS OF LEFT KNEE: ICD-10-CM

## 2024-03-18 DIAGNOSIS — E78.2 MIXED HYPERLIPIDEMIA: ICD-10-CM

## 2024-03-18 DIAGNOSIS — R73.03 PREDIABETES: ICD-10-CM

## 2024-03-18 DIAGNOSIS — C79.11 SECONDARY MALIGNANT NEOPLASM OF BLADDER (HCC): ICD-10-CM

## 2024-03-18 DIAGNOSIS — M48.062 SPINAL STENOSIS OF LUMBAR REGION WITH NEUROGENIC CLAUDICATION: ICD-10-CM

## 2024-03-18 DIAGNOSIS — Z13.0 SCREENING FOR DEFICIENCY ANEMIA: ICD-10-CM

## 2024-03-18 DIAGNOSIS — C61 PROSTATE CANCER (HCC): Primary | ICD-10-CM

## 2024-03-18 DIAGNOSIS — Z13.29 SCREENING FOR THYROID DISORDER: ICD-10-CM

## 2024-03-18 PROCEDURE — 99214 OFFICE O/P EST MOD 30 MIN: CPT | Performed by: FAMILY MEDICINE

## 2024-03-18 NOTE — ASSESSMENT & PLAN NOTE
Patient scheduled for left total knee arthroplasty at [facility] in a few weeks.

## 2024-03-18 NOTE — PROGRESS NOTES
Name: Wayne Mosley      : 1960      MRN: [id number]  Encounter Provider: Martinez Chen DO  Encounter Date: 3/18/2024   Encounter department: [facility]    Assessment & Plan     1. Prostate cancer (HCC)  Assessment & Plan:  Diagnosed in .  Patient had robotic prostatectomy in 2021 at [facility].  He was found to have microinvasion of the bladder neck at that time, which was excised.  He has been doing well since, his PSAs have been undetectable.  Continue follow-up with urologist at [place]      2. Spinal stenosis of lumbar region with neurogenic claudication  Assessment & Plan:  History of spinal stenosis low back.  He has been having some flares recently.  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      3. Mixed hyperlipidemia  Assessment & Plan:  Cholesterol from March 15 170, .  HDL remains low at 34.  Current ASCVD risk score 13.8%.  Doing well on rosuvastatin 10 mg daily.  Will send for coronary calcium CT to help risk stratify.    Orders:  -     Lipid Panel with Direct LDL reflex; Future; Expected date: 2024  -     CT coronary calcium score; Future; Expected date: 2024  -     Lipid Panel with Direct LDL reflex    4. Secondary malignant neoplasm of bladder (HCC)  Assessment & Plan:  (See prostate cancer)      5. Screening for deficiency anemia  -     CBC and differential; Future; Expected date: 2024  -     CBC and differential    6. Screening for thyroid disorder  -     TSH, 3rd generation with Free T4 reflex; Future; Expected date: 2024  -     TSH, 3rd generation with Free T4 reflex    7. Prediabetes  Assessment & Plan:  A1c stable at 5.5%.  Continue diet and exercise    Orders:  -     Comprehensive metabolic panel; Future; Expected date: 2024  -     Hemoglobin A1c (w/out EAG); Future; Expected date: 2024  -     Comprehensive metabolic panel  -     Hemoglobin A1c (w/out EAG)    8. Arthritis of  left knee  Assessment & Plan:  Patient scheduled for left total knee arthroplasty at [facility] in a few weeks.           Last colonoscopy 2022 (next 2027)    Patient had COVID booster this season  Patient had flu shot this season  Patient current with pneumonia vaccination  Last tetanus booster 2022  Patient had Shingrix vaccination    Lab results from March 15 reviewed with patient    6 months, physical, fasting blood work prior (PSA ordered by urology)  Subjective     Patient presents for recheck chronic medical problems today.  Overall he is feeling well.  He is having some orthopedic issues recently.  He will be retiring soon.  He has upcoming left total knee replacement scheduled in a few weeks at [facility].  He had labs done on March 15      Review of Systems   Respiratory: Negative.     Cardiovascular: Negative.    Gastrointestinal: Negative.    Genitourinary: Negative.    Musculoskeletal:  Positive for arthralgias.       Past Medical History:   Diagnosis Date   • Abnormal blood chemistry    • Allergic rhinitis     last assessed 08/22/2014   • Anxiety    • Elevated PSA    • Erectile dysfunction of non-organic origin    • Hyperglycemia     Last Assessed: 1/20/2016    • Hyperlipidemia    • Kidney stone    • Paresthesia of foot     last assessed 08/14/2015     Past Surgical History:   Procedure Laterality Date   • COLONOSCOPY     • FOOT SURGERY     • HAND SURGERY Left    • HERNIA REPAIR     • INGUINAL HERNIA REPAIR     • KNEE SURGERY     • NE COLONOSCOPY FLX DX W/COLLJ SPEC WHEN PFRMD N/A 01/10/2017    Procedure: COLONOSCOPY;  Surgeon: Car Barbour MD;  Location: [facility] GI LAB;  Service: Gastroenterology   • NE CYSTO/URETERO W/LITHOTRIPSY &INDWELL STENT INSRT Left 02/01/2018    Procedure: CYSTOSCOPY, LEFT URETEROSCOPY, RETROGRADE PYELOGRAM;  Surgeon: James Whaley MD;  Location: [facility] MAIN OR;  Service: Urology   • PROSTATE BIOPSY  12/27/2019     Family History   Problem Relation Age of Onset    • Lung cancer Mother    • Lung cancer Father      Social History     Socioeconomic History   • Marital status: /Civil Union     Spouse name: None   • Number of children: None   • Years of education: None   • Highest education level: None   Occupational History   • None   Tobacco Use   • Smoking status: Never   • Smokeless tobacco: Never   Vaping Use   • Vaping status: Never Used   Substance and Sexual Activity   • Alcohol use: Yes     Comment: occasional/social alcohol use    • Drug use: No   • Sexual activity: Yes     Partners: Female   Other Topics Concern   • None   Social History Narrative    Feels Safe at home     No guns in the home     Always uses seat belt      Social Determinants of Health     Financial Resource Strain: Not on file   Food Insecurity: Not on file   Transportation Needs: Not on file   Physical Activity: Not on file   Stress: Not on file   Social Connections: Not on file   Intimate Partner Violence: Not on file   Housing Stability: Not on file     Current Outpatient Medications on File Prior to Visit   Medication Sig   • ascorbic acid (VITAMIN C) 500 mg tablet Take 1,000 mg by mouth daily   • DAILY MULTIPLE VITAMINS/IRON PO Take 1 tablet by mouth daily   • rosuvastatin (CRESTOR) 10 MG tablet Take 1 tablet (10 mg total) by mouth daily   • tadalafil (CIALIS) 5 MG tablet 1 tab daily     Allergies   Allergen Reactions   • Other      trees   • Pollen Extract      Immunization History   Administered Date(s) Administered   • COVID-19 MODERNA VACC 0.5 ML IM 03/27/2021, 04/24/2021, 10/25/2021, 06/02/2022   • COVID-19 Moderna Vac BIVALENT 12 Yr+ IM 0.5 ML 11/14/2022   • Hep A / Hep B 03/06/2003, 03/06/2003, 05/23/2003, 05/23/2003, 11/10/2003, 11/10/2003   • Influenza Quadrivalent, 6-35 Months IM 12/29/2015, 08/21/2017   • Influenza, recombinant, quadrivalent,injectable, preservative free 09/05/2018, 01/16/2020, 11/09/2021, 10/31/2022   • Influenza, seasonal, injectable 10/25/2013   •  "Pneumococcal Conjugate Vaccine 20-valent (Pcv20), Polysace 03/13/2023   • Tdap 03/09/2022   • Zoster Vaccine Recombinant 08/01/2023, 11/14/2023       Objective     /80 (BP Location: Left arm, Patient Position: Sitting, Cuff Size: Adult)   Pulse 86   Temp 98 °F (36.7 °C)   Ht 5' 9.75\" (1.772 m)   Wt 70.8 kg (156 lb)   SpO2 98%   BMI 22.54 kg/m²     Physical Exam  Cardiovascular:      Rate and Rhythm: Normal rate and regular rhythm.      Heart sounds: Normal heart sounds.      Comments: Carotids: no bruits  Ext: no edema  Pulmonary:      Effort: Pulmonary effort is normal. No respiratory distress.      Breath sounds: No wheezing or rales.   Psychiatric:         Behavior: Behavior normal.         Thought Content: Thought content normal.       Martinez Chen, DO    "

## 2024-03-18 NOTE — ASSESSMENT & PLAN NOTE
Cholesterol from March 15 170, .  HDL remains low at 34.  Current ASCVD risk score 13.8%.  Doing well on rosuvastatin 10 mg daily.  Will send for coronary calcium CT to help risk stratify.

## 2024-03-18 NOTE — ASSESSMENT & PLAN NOTE
Diagnosed in 2021.  Patient had robotic prostatectomy in November 2021 at [facility].  He was found to have microinvasion of the bladder neck at that time, which was excised.  He has been doing well since, his PSAs have been undetectable.  Continue follow-up with urologist at [place]

## 2024-03-18 NOTE — ASSESSMENT & PLAN NOTE
History of spinal stenosis low back.  He has been having some flares recently.  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

## 2024-03-28 ENCOUNTER — HOSPITAL ENCOUNTER (OUTPATIENT)
Dept: CT IMAGING | Facility: HOSPITAL | Age: 64
Discharge: HOME | End: 2024-03-28
Payer: COMMERCIAL

## 2024-03-28 ENCOUNTER — TELEPHONE (OUTPATIENT)
Age: 64
End: 2024-03-28

## 2024-03-28 DIAGNOSIS — E78.2 MIXED HYPERLIPIDEMIA: ICD-10-CM

## 2024-03-28 PROCEDURE — 75571 CT HRT W/O DYE W/CA TEST: CPT

## 2024-03-28 NOTE — TELEPHONE ENCOUNTER
Luly from [facility] called and requested that patient's most recent labs are faxed to them at [phone].

## 2024-03-28 NOTE — TELEPHONE ENCOUNTER
Please call patient and verify he is being seen at [facility] and fax lab results if he approves.

## 2024-03-28 NOTE — TELEPHONE ENCOUNTER
Previous fax number was unsuccessful.     Labs faxed to alternate fax [phone] successfully. Lvm at patients phone number letting him know the labs were faxed.   1.64

## 2024-04-02 ENCOUNTER — OFFICE VISIT (OUTPATIENT)
Dept: PODIATRY | Facility: CLINIC | Age: 64
End: 2024-04-02
Payer: COMMERCIAL

## 2024-04-02 VITALS — WEIGHT: 156 LBS | RESPIRATION RATE: 18 BRPM | HEIGHT: 69 IN | BODY MASS INDEX: 23.11 KG/M2

## 2024-04-02 DIAGNOSIS — M79.675 PAIN IN TOE OF LEFT FOOT: ICD-10-CM

## 2024-04-02 DIAGNOSIS — L60.0 INGROWN TOENAIL: Primary | ICD-10-CM

## 2024-04-02 PROCEDURE — 99212 OFFICE O/P EST SF 10 MIN: CPT | Performed by: PODIATRIST

## 2024-04-02 NOTE — PROGRESS NOTES
Patient presents for assessment of left third toe.  Patient has ingrown nail pain along the distal lateral nail border.  Pain that he has been having in the right fourth toe is gone.    Patient notes that he is scheduled for knee replacement on April 5.    On exam, left third toenail has been cut too short leading to his ingrown nail pain.  Superficial avulsion performed followed by bacitracin to the digit.  Reappoint 4 weeks for assessment.

## 2024-04-03 ENCOUNTER — TELEPHONE (OUTPATIENT)
Age: 64
End: 2024-04-03

## 2024-04-03 DIAGNOSIS — E78.2 MIXED HYPERLIPIDEMIA: ICD-10-CM

## 2024-04-03 RX ORDER — ROSUVASTATIN CALCIUM 20 MG/1
20 TABLET, COATED ORAL DAILY
Qty: 90 TABLET | Refills: 1 | Status: SHIPPED | OUTPATIENT
Start: 2024-04-03

## 2024-04-03 NOTE — TELEPHONE ENCOUNTER
Patient called, states that he had a coronary/calcium screening completed. States that he would like a call back from the PCP to discuss the result as there is elevation noted in the result.     Please advise.

## 2024-04-04 NOTE — TELEPHONE ENCOUNTER
I spoke with patient regarding coronary calcium CT results.  Will be increasing statin dose.  Will also check stress test and echo.

## 2024-04-09 ENCOUNTER — OFFICE VISIT (OUTPATIENT)
Dept: PHYSICAL THERAPY | Facility: REHABILITATION | Age: 64
End: 2024-04-09
Payer: COMMERCIAL

## 2024-04-09 DIAGNOSIS — M25.562 ACUTE PAIN OF LEFT KNEE: Primary | ICD-10-CM

## 2024-04-09 DIAGNOSIS — Z96.652 S/P TOTAL KNEE ARTHROPLASTY, LEFT: ICD-10-CM

## 2024-04-09 PROCEDURE — 97161 PT EVAL LOW COMPLEX 20 MIN: CPT | Performed by: PHYSICAL THERAPIST

## 2024-04-09 PROCEDURE — 97112 NEUROMUSCULAR REEDUCATION: CPT | Performed by: PHYSICAL THERAPIST

## 2024-04-09 RX ORDER — OXYCODONE HYDROCHLORIDE 5 MG/1
5 CAPSULE ORAL EVERY 4 HOURS PRN
COMMUNITY

## 2024-04-09 RX ORDER — TRAMADOL HYDROCHLORIDE 50 MG/1
50 TABLET ORAL EVERY 6 HOURS PRN
COMMUNITY

## 2024-04-09 NOTE — PROGRESS NOTES
PT Evaluation     Today's date: 2024  Patient name: Wayne Mosley  : 1960  MRN: [id number]  Referring provider: Angelito Matos MD  Dx:   Encounter Diagnosis     ICD-10-CM    1. Acute pain of left knee  M25.562       2. S/P total knee arthroplasty, left  Z96.652           Start Time: 0815  Stop Time: 0900  Total time in clinic (min): 45 minutes    Assessment  Assessment details: Patient is a 64 y.o. male presenting s/p L TKA with date of surgery 24. Resulting postoperative impairments include L knee ROM deficits, L knee strength deficits, and gait dysfunction. As a result of impairments patient experiences limitations with functional/daily activities including ambulating with RW, step to step stair navigation, sit to stand transfers, toilet transfers, recreational walking, driving, cycling, and golfing. Precautions, surgical protocol, and signs/symptoms of infection were reviewed with patient who expressed verbal understanding. Patient has the above listed impairments and will benefit from skilled PT to improve deficits to return to prior level of function.    Impairments: abnormal muscle firing, abnormal or restricted ROM, abnormal movement, activity intolerance, impaired physical strength and pain with function     Prognosis: good    Goals  Impairment Goals: 4-6 weeks  - Patient to decrease pain to 0/10  - Patient to improve knee PROM to equal to uninvolved side  - Patient to increase knee strength to 4/5 throughout  - Patient to increase hip strength to 4/5 throughout    Functional Goals: by discharge  - Patient to discharge to independent [facility]  - Patient to return to prior level of function  - Patient to improve knee AROM to equal to uninvolved side  - Patient to ambulate in home and community without increased pain or difficulty   - Patient to ascend and descend stairs without increased pain or difficulty  - Patient to complete sit to stand transfers without increased pain or difficulty  -  Patient to return to golf    Plan  Patient would benefit from: skilled physical therapy  Planned modality interventions: cryotherapy, TENS and thermotherapy: hydrocollator packs  Planned therapy interventions: flexibility, home exercise program, joint mobilization, manual therapy, neuromuscular re-education, patient education, strengthening, stretching, therapeutic activities, therapeutic exercise and functional ROM exercises  Frequency: 2x week  Duration in weeks: 8  Treatment plan discussed with: patient        Subjective Evaluation    History of Present Illness  Mechanism of injury: HISTORY OF PRESENT ILLNESS: Patient presents s/p L TKA 24 and was discharged home same day. He has been ambulating with RW since however uses SPC in home for maneuverability.   PRIOR TREATMENT: CSI, PT  AGGRAVATING FACTORS: static positions, sleeping, weightbearing, walking, stair navigation  EASING FACTORS: ice  WORK: waste water management  (out of work)  FUNCTIONAL LIMITATIONS: ambulating with RW, step to step stair navigation, sit to stand transfers, toilet transfers, recreational walking, driving, cycling, and golfing  SUBJECTIVE FUNCTIONAL LEVEL: 30%  PATIENT GOAL: to be able to play golf  Pain  Current pain ratin  At best pain ratin  At worst pain ratin  Location: L knee          Objective     Observations     Additional Observation Details  Incision covered with bandage with no excessive redness, warmth or other signs of infection    Active Range of Motion   Left Knee   Flexion: 38 degrees   Extension: -5 degrees     Right Knee   Flexion: 145 degrees   Extension: -1 degrees     Strength/Myotome Testing     Right Hip   Planes of Motion   Flexion: 5  Extension: 4  Abduction: 4    Left Knee   Quadriceps contraction: fair    Right Knee   Flexion: 5  Extension: 5  Quadriceps contraction: good    Left Ankle/Foot   Dorsiflexion: 5    Right Ankle/Foot   Dorsiflexion: 5    Additional Strength Details  DNT L LE  "strength due to surgical acuity    Ambulation     Observational Gait     Additional Observational Gait Details  Ambulating with RW with decreased knee flexion during swing and decreased terminal knee extension      Flowsheet Rows      Flowsheet Row Most Recent Value   PT/OT G-Codes    Current Score 32   Projected Score 72               Diagnosis: s/p L TKA 4/5/24   Precautions: none   Primary impairments: L knee ROM deficits, L knee strength deficits, and gait dysfunction   *asterisks by exercise = given for HEP    4/9       Manuals        L knee PROM and patellar mobilizations        L tibiofemoral mobilizations                                There Ex        Rec bike        Heel slides *  5\" x 10       Seated HS stretch        Strap gastroc stretch        Supine knee ext prop                                        Neuro Re-Ed        Quad sets *  5\" x 10       Bridges        Supine SLR        S/L hip abd        Mini squats        Heel/toe raises                                                        Re-evaluation             Ther Act/Gait                                         Modalities             CP prn.  10'                                            "

## 2024-04-09 NOTE — LETTER
2024    Angelito Matos MD  [address] [id number]    Patient: Wayne Mosley   YOB: 1960   Date of Visit: 2024     Encounter Diagnosis     ICD-10-CM    1. Acute pain of left knee  M25.562       2. S/P total knee arthroplasty, left  Z96.652           Dear Dr. Matos:    Thank you for your recent referral of Wayne Mosley. Please review the attached evaluation summary from Wayne's recent visit.     Please verify that you agree with the plan of care by signing the attached order.     If you have any questions or concerns, please do not hesitate to call.     I sincerely appreciate the opportunity to share in the care of one of your patients and hope to have another opportunity to work with you in the near future.       Sincerely,    Danny Tay, PT      Referring Provider:      I certify that I have read the below Plan of Care and certify the need for these services furnished under this plan of treatment while under my care.                    Angelito Matos MD  [address] [address]  Via Fax: [phone]          PT Evaluation     Today's date: 2024  Patient name: Wayne Mosley  : 1960  MRN: [id number]  Referring provider: Angelito Matos MD  Dx:   Encounter Diagnosis     ICD-10-CM    1. Acute pain of left knee  M25.562       2. S/P total knee arthroplasty, left  Z96.652           Start Time: 0815  Stop Time: 0900  Total time in clinic (min): 45 minutes    Assessment  Assessment details: Patient is a 64 y.o. male presenting s/p L TKA with date of surgery 24. Resulting postoperative impairments include L knee ROM deficits, L knee strength deficits, and gait dysfunction. As a result of impairments patient experiences limitations with functional/daily activities including ambulating with RW, step to step stair navigation, sit to stand transfers, toilet transfers, recreational walking, driving,  cycling, and golfing. Precautions, surgical protocol, and signs/symptoms of infection were reviewed with patient who expressed verbal understanding. Patient has the above listed impairments and will benefit from skilled PT to improve deficits to return to prior level of function.    Impairments: abnormal muscle firing, abnormal or restricted ROM, abnormal movement, activity intolerance, impaired physical strength and pain with function     Prognosis: good    Goals  Impairment Goals: 4-6 weeks  - Patient to decrease pain to 0/10  - Patient to improve knee PROM to equal to uninvolved side  - Patient to increase knee strength to 4/5 throughout  - Patient to increase hip strength to 4/5 throughout    Functional Goals: by discharge  - Patient to discharge to independent [facility]  - Patient to return to prior level of function  - Patient to improve knee AROM to equal to uninvolved side  - Patient to ambulate in home and community without increased pain or difficulty   - Patient to ascend and descend stairs without increased pain or difficulty  - Patient to complete sit to stand transfers without increased pain or difficulty  - Patient to return to golf    Plan  Patient would benefit from: skilled physical therapy  Planned modality interventions: cryotherapy, TENS and thermotherapy: hydrocollator packs  Planned therapy interventions: flexibility, home exercise program, joint mobilization, manual therapy, neuromuscular re-education, patient education, strengthening, stretching, therapeutic activities, therapeutic exercise and functional ROM exercises  Frequency: 2x week  Duration in weeks: 8  Treatment plan discussed with: patient        Subjective Evaluation    History of Present Illness  Mechanism of injury: HISTORY OF PRESENT ILLNESS: Patient presents s/p L TKA 4/5/24 and was discharged home same day. He has been ambulating with RW since however uses SPC in home for maneuverability.   PRIOR TREATMENT: CSI, PT  AGGRAVATING  "FACTORS: static positions, sleeping, weightbearing, walking, stair navigation  EASING FACTORS: ice  WORK: waste water management  (out of work)  FUNCTIONAL LIMITATIONS: ambulating with RW, step to step stair navigation, sit to stand transfers, toilet transfers, recreational walking, driving, cycling, and golfing  SUBJECTIVE FUNCTIONAL LEVEL: 30%  PATIENT GOAL: to be able to play golf  Pain  Current pain ratin  At best pain ratin  At worst pain ratin  Location: L knee          Objective     Observations     Additional Observation Details  Incision covered with bandage with no excessive redness, warmth or other signs of infection    Active Range of Motion   Left Knee   Flexion: 38 degrees   Extension: -5 degrees     Right Knee   Flexion: 145 degrees   Extension: -1 degrees     Strength/Myotome Testing     Right Hip   Planes of Motion   Flexion: 5  Extension: 4  Abduction: 4    Left Knee   Quadriceps contraction: fair    Right Knee   Flexion: 5  Extension: 5  Quadriceps contraction: good    Left Ankle/Foot   Dorsiflexion: 5    Right Ankle/Foot   Dorsiflexion: 5    Additional Strength Details  DNT L LE strength due to surgical acuity    Ambulation     Observational Gait     Additional Observational Gait Details  Ambulating with RW with decreased knee flexion during swing and decreased terminal knee extension      Flowsheet Rows      Flowsheet Row Most Recent Value   PT/OT G-Codes    Current Score 32   Projected Score 72               Diagnosis: s/p L TKA 24   Precautions: none   Primary impairments: L knee ROM deficits, L knee strength deficits, and gait dysfunction   *asterisks by exercise = given for HEP           Manuals        L knee PROM and patellar mobilizations        L tibiofemoral mobilizations                                There Ex        Rec bike        Heel slides *  5\" x 10       Seated HS stretch        Strap gastroc stretch        Supine knee ext prop                           " "             Neuro Re-Ed        Quad sets *  5\" x 10       Bridges        Supine SLR        S/L hip abd        Mini squats        Heel/toe raises                                                        Re-evaluation             Ther Act/Gait                                         Modalities             CP prn.  10'                                                            "

## 2024-04-12 ENCOUNTER — OFFICE VISIT (OUTPATIENT)
Dept: PHYSICAL THERAPY | Facility: REHABILITATION | Age: 64
End: 2024-04-12
Payer: COMMERCIAL

## 2024-04-12 DIAGNOSIS — Z96.652 S/P TOTAL KNEE ARTHROPLASTY, LEFT: ICD-10-CM

## 2024-04-12 DIAGNOSIS — M25.562 ACUTE PAIN OF LEFT KNEE: Primary | ICD-10-CM

## 2024-04-12 PROCEDURE — 97112 NEUROMUSCULAR REEDUCATION: CPT | Performed by: PHYSICAL THERAPIST

## 2024-04-12 PROCEDURE — 97110 THERAPEUTIC EXERCISES: CPT | Performed by: PHYSICAL THERAPIST

## 2024-04-12 PROCEDURE — 97140 MANUAL THERAPY 1/> REGIONS: CPT | Performed by: PHYSICAL THERAPIST

## 2024-04-12 NOTE — PROGRESS NOTES
"Daily Note     Today's date: 2024  Patient name: Wayne Mosley  : 1960  MRN: [id number]  Referring provider: Valerie Bailey DO  Dx:   Encounter Diagnosis     ICD-10-CM    1. Acute pain of left knee  M25.562       2. S/P total knee arthroplasty, left  Z96.652           Start Time: 0815  Stop Time: 09  Total time in clinic (min): 48 minutes    Subjective: Patient reports he has continued to have significant pain. He has been compliant with HEP      Objective: See treatment diary below      Assessment: Tolerated treatment fair given postoperative pain and inflammation. Good quadriceps recruitment during muscle sets however patient challenged with quadriceps recruitment during short-arc quadriceps. He is able to achieve improved contraction with smaller bolster. Patient demonstrated fatigue post treatment, exhibited good technique with therapeutic exercises, and would benefit from continued PT      Plan: Continue per plan of care.      Diagnosis: s/p L TKA 24   Precautions: none   Primary impairments: L knee ROM deficits, L knee strength deficits, and gait dysfunction   *asterisks by exercise = given for HEP          Manuals        L knee PROM and patellar mobilizations   8'      L tibiofemoral mobilizations   4'                              There Ex        Rec bike   / revs x 5'      Heel slides *  5\" x 10  5\" x 10      Seated HS stretch        Strap gastroc stretch        Supine knee ext prop        Seated knee flex stretch        Hip abd slides   X 15                      Neuro Re-Ed        Quad sets *  5\" x 10  5\" x 10      SAQ   X 5 blue bolster      Bridges        Supine SLR        S/L hip abd        Mini squats        Heel/toe raises                                                        Re-evaluation             Ther Act/Gait                                         Modalities             CP prn.  10'  10'                                           "

## 2024-04-16 ENCOUNTER — OFFICE VISIT (OUTPATIENT)
Dept: PHYSICAL THERAPY | Facility: REHABILITATION | Age: 64
End: 2024-04-16
Payer: COMMERCIAL

## 2024-04-16 DIAGNOSIS — Z96.652 S/P TOTAL KNEE ARTHROPLASTY, LEFT: ICD-10-CM

## 2024-04-16 DIAGNOSIS — M25.562 ACUTE PAIN OF LEFT KNEE: Primary | ICD-10-CM

## 2024-04-16 PROCEDURE — 97110 THERAPEUTIC EXERCISES: CPT | Performed by: PHYSICAL THERAPIST

## 2024-04-16 PROCEDURE — 97140 MANUAL THERAPY 1/> REGIONS: CPT | Performed by: PHYSICAL THERAPIST

## 2024-04-16 PROCEDURE — 97112 NEUROMUSCULAR REEDUCATION: CPT | Performed by: PHYSICAL THERAPIST

## 2024-04-16 NOTE — PROGRESS NOTES
"Daily Note     Today's date: 2024  Patient name: Wayne Mosley  : 1960  MRN: [id number]  Referring provider: Valerie Bailey DO  Dx:   Encounter Diagnosis     ICD-10-CM    1. Acute pain of left knee  M25.562       2. S/P total knee arthroplasty, left  Z96.652           Start Time: 0820  Stop Time: 0858  Total time in clinic (min): 38 minutes    Subjective: Patient reports continued soreness and he is having a hard time sleeping. He has been compliant with HEP. He is trying to wean off of the Oxycodone      Objective: See treatment diary below      Assessment: Tolerated treatment well with improving knee flexion since last visit. Patient lacks a few degrees of terminal knee extension however remains most limited in flexion. He is able to complete supine straight leg raises at low repetitions without extension lag. Patient demonstrated fatigue post treatment, exhibited good technique with therapeutic exercises, and would benefit from continued PT      Plan: Continue per plan of care.      Diagnosis: s/p L TKA 24   Precautions: none   Primary impairments: L knee ROM deficits, L knee strength deficits, and gait dysfunction   *asterisks by exercise = given for HEP         Manuals        L knee PROM and patellar mobilizations   8'  8'     L tibiofemoral mobilizations   4'  4'                             There Ex        Rec bike   1/2 revs x 5'  1/2 revs x 6'     Heel slides *  5\" x 10  5\" x 10  5\" x 10     Seated HS stretch        Strap gastroc stretch        Supine knee ext prop        Seated knee flex stretch        Hip abd slides   X 15  X 15                     Neuro Re-Ed        Quad sets *  5\" x 10  5\" x 10  5\" x 12     SAQ   X 5 blue bolster  X 10 blue bolster     Bridges        Supine SLR    X 5     S/L hip abd        Mini squats        Heel/toe raises                                                        Re-evaluation             Ther Act/Gait                                      "    Modalities             CP prn.  10'  10'

## 2024-04-19 ENCOUNTER — OFFICE VISIT (OUTPATIENT)
Dept: PHYSICAL THERAPY | Facility: REHABILITATION | Age: 64
End: 2024-04-19
Payer: COMMERCIAL

## 2024-04-19 DIAGNOSIS — M25.562 ACUTE PAIN OF LEFT KNEE: Primary | ICD-10-CM

## 2024-04-19 DIAGNOSIS — Z96.652 S/P TOTAL KNEE ARTHROPLASTY, LEFT: ICD-10-CM

## 2024-04-19 PROCEDURE — 97140 MANUAL THERAPY 1/> REGIONS: CPT | Performed by: PHYSICAL THERAPIST

## 2024-04-19 PROCEDURE — 97112 NEUROMUSCULAR REEDUCATION: CPT | Performed by: PHYSICAL THERAPIST

## 2024-04-19 PROCEDURE — 97110 THERAPEUTIC EXERCISES: CPT | Performed by: PHYSICAL THERAPIST

## 2024-04-19 NOTE — PROGRESS NOTES
"Daily Note     Today's date: 2024  Patient name: Wayne Mosley  : 1960  MRN: [id number]  Referring provider: Valerie Bailey DO  Dx:   Encounter Diagnosis     ICD-10-CM    1. Acute pain of left knee  M25.562       2. S/P total knee arthroplasty, left  Z96.652           Start Time: 0815  Stop Time: 0900  Total time in clinic (min): 45 minutes    Subjective: Patient is sore on arrival due to walking 2 blocks and doing his exercises the other day. He has been compliant with HEP      Objective: See treatment diary below      Assessment: Tolerated treatment well. Continued limitations in knee flexion ROM compared to extension. Very good quadriceps recruitment noted. Updated HEP to include supine straight leg raises. Patient demonstrated fatigue post treatment, exhibited good technique with therapeutic exercises, and would benefit from continued PT      Plan: Continue per plan of care.      Diagnosis: s/p L TKA 24   Precautions: none   Primary impairments: L knee ROM deficits, L knee strength deficits, and gait dysfunction   *asterisks by exercise = given for HEP        Manuals        L knee PROM and patellar mobilizations   8'  8'  8'    L tibiofemoral mobilizations   4'  4'  4'                            There Ex        Rec bike   1/2 revs x 5'  1/2 revs x 6'  1/2 revs x 8'    Heel slides *  5\" x 10  5\" x 10  5\" x 10  5\" x 10    Seated HS stretch        Strap gastroc stretch        Supine knee ext prop        Seated knee flex stretch        Hip abd slides   X 15  X 15  X 15                    Neuro Re-Ed        Quad sets *  5\" x 10  5\" x 10  5\" x 12  10\" x 10    SAQ   X 5 blue bolster  X 10 blue bolster  X 10 blue bolster    Bridges        Supine SLR *    X 5  X 5    S/L hip abd        Mini squats        Heel/toe raises     X 15 ea    Band TKE     Green 5\" x 10                                            Re-evaluation             Ther Act/Gait                                       "   Modalities             CP prn.  10'  10'

## 2024-04-23 ENCOUNTER — OFFICE VISIT (OUTPATIENT)
Dept: PHYSICAL THERAPY | Facility: REHABILITATION | Age: 64
End: 2024-04-23
Payer: COMMERCIAL

## 2024-04-23 DIAGNOSIS — M25.562 ACUTE PAIN OF LEFT KNEE: Primary | ICD-10-CM

## 2024-04-23 DIAGNOSIS — Z96.652 S/P TOTAL KNEE ARTHROPLASTY, LEFT: ICD-10-CM

## 2024-04-23 PROCEDURE — 97140 MANUAL THERAPY 1/> REGIONS: CPT | Performed by: PHYSICAL THERAPIST

## 2024-04-23 PROCEDURE — 97112 NEUROMUSCULAR REEDUCATION: CPT | Performed by: PHYSICAL THERAPIST

## 2024-04-23 PROCEDURE — 97110 THERAPEUTIC EXERCISES: CPT | Performed by: PHYSICAL THERAPIST

## 2024-04-23 NOTE — PROGRESS NOTES
"Daily Note     Today's date: 2024  Patient name: Wayne Mosley  : 1960  MRN: [id number]  Referring provider: Valerie Bailey DO  Dx:   Encounter Diagnosis     ICD-10-CM    1. Acute pain of left knee  M25.562       2. S/P total knee arthroplasty, left  Z96.652           Start Time: 0815  Stop Time: 0910  Total time in clinic (min): 55 minutes    Subjective: Patient continues to have a hard time sleeping due to stiffness and pain      Objective: See treatment diary below      Assessment: Improved tolerance to knee flexion PROM in seated this visit compared to supine. Patient able to complete short-arc quadriceps over larger bolster this visit. Updated HEP to include seated self knee flexion stretch as patient prefers this position and achieves increased flexion angle compared to heel slides. Patient demonstrated fatigue post treatment, exhibited good technique with therapeutic exercises, and would benefit from continued PT      Plan: Continue per plan of care.      Diagnosis: s/p L TKA 24   Precautions: none   Primary impairments: L knee ROM deficits, L knee strength deficits, and gait dysfunction   *asterisks by exercise = given for HEP       Manuals        L knee PROM and patellar mobilizations   8'  8'  8'  19'   L tibiofemoral mobilizations   4'  4'  4'  4'                           There Ex        Rec bike   1/2 revs x 5'  1/2 revs x 6'  1/2 revs x 8'  1/2 revs x 8'   Heel slides *  5\" x 10  5\" x 10  5\" x 10  5\" x 10    Strap gastroc stretch        Seated knee flex stretch *      5\" x 10   Hip abd slides   X 15  X 15  X 15                    Neuro Re-Ed        Quad sets *  5\" x 10  5\" x 10  5\" x 12  10\" x 10  HEP   SAQ   X 5 blue bolster  X 10 blue bolster  X 10 blue bolster  5\" x 10   Bridges        Supine SLR *    X 5  X 5  2 x 5   S/L hip abd        Mini squats        Heel/toe raises     X 15 ea  X 15 ea   Band TKE     Green 5\" x 10  Blue 5\" x 10                       "                     Re-evaluation             Ther Act/Gait                                         Modalities             CP prn.  10'  10'    10'

## 2024-04-26 ENCOUNTER — OFFICE VISIT (OUTPATIENT)
Dept: PHYSICAL THERAPY | Facility: REHABILITATION | Age: 64
End: 2024-04-26
Payer: COMMERCIAL

## 2024-04-26 DIAGNOSIS — Z96.652 S/P TOTAL KNEE ARTHROPLASTY, LEFT: ICD-10-CM

## 2024-04-26 DIAGNOSIS — M25.562 ACUTE PAIN OF LEFT KNEE: Primary | ICD-10-CM

## 2024-04-26 PROCEDURE — 97140 MANUAL THERAPY 1/> REGIONS: CPT | Performed by: PHYSICAL THERAPIST

## 2024-04-26 PROCEDURE — 97110 THERAPEUTIC EXERCISES: CPT | Performed by: PHYSICAL THERAPIST

## 2024-04-26 PROCEDURE — 97112 NEUROMUSCULAR REEDUCATION: CPT | Performed by: PHYSICAL THERAPIST

## 2024-04-26 NOTE — PROGRESS NOTES
"Daily Note     Today's date: 2024  Patient name: Wayne Mosley  : 1960  MRN: [id number]  Referring provider: Valerie Bailey DO  Dx:   Encounter Diagnosis     ICD-10-CM    1. Acute pain of left knee  M25.562       2. S/P total knee arthroplasty, left  Z96.652           Start Time: 08  Stop Time: 0910  Total time in clinic (min): 50 minutes    Subjective: Patient reports continues stiffness but he thinks the exercises are helping. He walked too much over the past couple days and is going to back off on his walking distance      Objective: See treatment diary below      Assessment: Educated patient regarding importance of high frequency of stretching at a tolerable intensity versus aggressive stretching less frequently. Patient continues to tolerate seated stretching better than supine. Instructed patient to focus on heel strike during gait to encourage knee flexion and to avoid hip circumduction. Patient demonstrated fatigue post treatment, exhibited good technique with therapeutic exercises, and would benefit from continued PT      Plan: Continue per plan of care.      Diagnosis: s/p L TKA 24   Precautions: none   Primary impairments: L knee ROM deficits, L knee strength deficits, and gait dysfunction   *asterisks by exercise = given for HEP       Manuals        L knee PROM and patellar mobilizations  19'  8'  8'  8'  19'   L tibiofemoral mobilizations  4'  4'  4'  4'  4'                           There Ex        Rec bike  1/2 revs x 8'  1/2 revs x 5'  1/2 revs x 6'  1/2 revs x 8'  1/2 revs x 8'   Heel slides *   5\" x 10  5\" x 10  5\" x 10    Strap gastroc stretch        Seated knee flex stretch *      5\" x 10   Hip abd slides   X 15  X 15  X 15                    Neuro Re-Ed        Quad sets *   5\" x 10  5\" x 12  10\" x 10  HEP   SAQ   X 5 blue bolster  X 10 blue bolster  X 10 blue bolster  5\" x 10   Bridges        Supine SLR *    X 5  X 5  2 x 5   S/L hip abd        Mini " "squats        Heel/toe raises  X 15 ea    X 15 ea  X 15 ea   Band TKE  Blue 5\" x 10    Green 5\" x 10  Blue 5\" x 10                                           Re-evaluation            Ther Act/Gait                                       Modalities             CP prn.  10'  10'    10'                                                "

## 2024-04-30 ENCOUNTER — OFFICE VISIT (OUTPATIENT)
Dept: PHYSICAL THERAPY | Facility: REHABILITATION | Age: 64
End: 2024-04-30
Payer: COMMERCIAL

## 2024-04-30 DIAGNOSIS — Z96.652 S/P TOTAL KNEE ARTHROPLASTY, LEFT: ICD-10-CM

## 2024-04-30 DIAGNOSIS — M25.562 ACUTE PAIN OF LEFT KNEE: Primary | ICD-10-CM

## 2024-04-30 PROCEDURE — 97110 THERAPEUTIC EXERCISES: CPT | Performed by: PHYSICAL THERAPIST

## 2024-04-30 PROCEDURE — 97140 MANUAL THERAPY 1/> REGIONS: CPT | Performed by: PHYSICAL THERAPIST

## 2024-04-30 NOTE — PROGRESS NOTES
"Daily Note     Today's date: 2024  Patient name: Wayne Mosley  : 1960  MRN: [id number]  Referring provider: Valerie Bailey DO  Dx:   Encounter Diagnosis     ICD-10-CM    1. Acute pain of left knee  M25.562       2. S/P total knee arthroplasty, left  Z96.652           Start Time: 0815  Stop Time: 0910  Total time in clinic (min): 55 minutes    Subjective: Patient reports he is starting to turn the corner with his pain and he is getting a little more motion on the flexion stretches      Objective: See treatment diary below      Assessment: Tolerated treatment well. Initiated mini squats with cueing to encourage hip strategy and to minimize anterior tibial translation. Increased knee flexion PROM this visit during manual therapy. Knee flexion PROM measured at 72 degrees. Patient demonstrated fatigue post treatment, exhibited good technique with therapeutic exercises, and would benefit from continued PT      Plan: Continue per plan of care.      Diagnosis: s/p L TKA 24   Precautions: none   Primary impairments: L knee ROM deficits, L knee strength deficits, and gait dysfunction   *asterisks by exercise = given for HEP       Manuals        L knee PROM and patellar mobilizations  19'  23'  8'  8'  19'   L tibiofemoral mobilizations  4'  7'  4'  4'  4'                           There Ex        Rec bike  1/2 revs x 8'  1/2 revs x 8'  1/2 revs x 6'  1/2 revs x 8'  1/2 revs x 8'   Heel slides *    5\" x 10  5\" x 10    Strap gastroc stretch        Seated knee flex stretch *      5\" x 10   Hip abd slides    X 15  X 15                    Neuro Re-Ed        Quad sets *    5\" x 12  10\" x 10  HEP   SAQ    X 10 blue bolster  X 10 blue bolster  5\" x 10   Bridges        Supine SLR *    X 5  X 5  2 x 5   S/L hip abd        Mini squats *   X 15      Heel/toe raises  X 15 ea  D/C   X 15 ea  X 15 ea   Band TKE  Blue 5\" x 10  Blue 5\" x 10   Green 5\" x 10  Blue 5\" x 10                             "               Re-evaluation            Ther Act/Gait                                       Modalities             CP prn.  10'  10'    10'

## 2024-05-03 ENCOUNTER — OFFICE VISIT (OUTPATIENT)
Dept: PHYSICAL THERAPY | Facility: REHABILITATION | Age: 64
End: 2024-05-03
Payer: COMMERCIAL

## 2024-05-03 DIAGNOSIS — Z96.652 S/P TOTAL KNEE ARTHROPLASTY, LEFT: ICD-10-CM

## 2024-05-03 DIAGNOSIS — M25.562 ACUTE PAIN OF LEFT KNEE: Primary | ICD-10-CM

## 2024-05-03 PROCEDURE — 97140 MANUAL THERAPY 1/> REGIONS: CPT | Performed by: PHYSICAL THERAPIST

## 2024-05-03 PROCEDURE — 97110 THERAPEUTIC EXERCISES: CPT | Performed by: PHYSICAL THERAPIST

## 2024-05-03 NOTE — PROGRESS NOTES
"Daily Note     Today's date: 5/3/2024  Patient name: Wayne Mosley  : 1960  MRN: [id number]  Referring provider: Valerie Bailey DO  Dx:   Encounter Diagnosis     ICD-10-CM    1. Acute pain of left knee  M25.562       2. S/P total knee arthroplasty, left  Z96.652           Start Time: 0815  Stop Time: 0908  Total time in clinic (min): 53 minutes    Subjective: Patient reports he is very sore today and is unsure why. He felt okay after his last visit      Objective: See treatment diary below      Assessment: Tolerated treatment well. Reduced quadriceps muscle guarding this visit. Knee flexion AAROM measured at 70 degrees with self stretch. Educated patient regarding prioritizing high frequency of moderate intensity stretching as opposed to very aggressive stretching less often. Patient demonstrated fatigue post treatment, exhibited good technique with therapeutic exercises, and would benefit from continued PT      Plan: Continue per plan of care.      Diagnosis: s/p L TKA 24   Precautions: none   Primary impairments: L knee ROM deficits, L knee strength deficits, and gait dysfunction   *asterisks by exercise = given for HEP    4/26 4/30 5/3 4/19 4/23   Manuals        L knee PROM and patellar mobilizations  19'  23'  22'  8'  19'   L tibiofemoral mobilizations  4'  7'  8'  4'  4'                           There Ex        Rec bike  1/2 revs x 8'  1/2 revs x 8'  1/2 revs x 8'  1/2 revs x 8'  1/2 revs x 8'   Heel slides *     5\" x 10    Strap gastroc stretch        Seated knee flex stretch *      5\" x 10   Hip abd slides     X 15                    Neuro Re-Ed        Quad sets *     10\" x 10  HEP   SAQ     X 10 blue bolster  5\" x 10   Bridges        Supine SLR *     X 5  2 x 5   S/L hip abd        Mini squats *   X 15  X 15     Heel/toe raises  X 15 ea  D/C   X 15 ea  X 15 ea   Band TKE  Blue 5\" x 10  Blue 5\" x 10  D/C  Green 5\" x 10  Blue 5\" x 10   Forward/lat step ups                                      "   Re-evaluation           Ther Act/Gait                                    Modalities            CP prn.  10'  10'  10'   10'

## 2024-05-07 ENCOUNTER — EVALUATION (OUTPATIENT)
Dept: PHYSICAL THERAPY | Facility: REHABILITATION | Age: 64
End: 2024-05-07
Payer: COMMERCIAL

## 2024-05-07 DIAGNOSIS — Z96.652 S/P TOTAL KNEE ARTHROPLASTY, LEFT: ICD-10-CM

## 2024-05-07 DIAGNOSIS — M25.562 ACUTE PAIN OF LEFT KNEE: Primary | ICD-10-CM

## 2024-05-07 PROCEDURE — 97110 THERAPEUTIC EXERCISES: CPT | Performed by: PHYSICAL THERAPIST

## 2024-05-07 PROCEDURE — 97112 NEUROMUSCULAR REEDUCATION: CPT | Performed by: PHYSICAL THERAPIST

## 2024-05-07 NOTE — LETTER
May 7, 2024    Angelito Matos MD  [address] [id number]    Patient: Wayne Mosley   YOB: 1960   Date of Visit: 2024     Encounter Diagnosis     ICD-10-CM    1. Acute pain of left knee  M25.562       2. S/P total knee arthroplasty, left  Z96.652           Dear Dr. Matos:    Thank you for your recent referral of Wayne Mosley. Please review the attached evaluation summary from Wayne's recent visit.     Please verify that you agree with the plan of care by signing the attached order.     If you have any questions or concerns, please do not hesitate to call.     I sincerely appreciate the opportunity to share in the care of one of your patients and hope to have another opportunity to work with you in the near future.       Sincerely,    Danny Tay, PT      Referring Provider:      I certify that I have read the below Plan of Care and certify the need for these services furnished under this plan of treatment while under my care.                    Angelito Matos MD  [address] [address]  Via Fax: [phone]          PT Re-Evaluation     Today's date: 2024  Patient name: Wayne Mosley  : 1960  MRN: [id number]  Referring provider: Angelito Matos MD  Dx:   Encounter Diagnosis     ICD-10-CM    1. Acute pain of left knee  M25.562       2. S/P total knee arthroplasty, left  Z96.652           Start Time: 815  Stop Time: 928  Total time in clinic (min): 73 minutes    Assessment  Assessment details: Patient is a 64 y.o. male presenting s/p L TKA with date of surgery 24. Patient exhibits fair progress toward objective and functional goals at time of reexamination. Patient exhibits improvements with dressing, driving short distances, ambulating with SPC in community and without AD in home, sit to stand transfers, and toilet transfers since initiating PT however continues to have limitations compared to  prior level of function. Remaining functional limitations include ambulating with SPC, step to step stair navigation, recreational walking, driving, cycling, and golfing. As a result of impairments patient has continued limitations with daily and functional activities and would benefit from continued skilled PT interventions to address these impairments in order to maximize function.       Impairments: abnormal muscle firing, abnormal or restricted ROM, abnormal movement, activity intolerance, impaired physical strength and pain with function     Prognosis: good    Goals  Impairment Goals: 4-6 weeks  - Patient to decrease pain to 0/10 - PROGRESSING  - Patient to improve knee PROM to equal to uninvolved side - PROGRESSING  - Patient to increase knee strength to 4/5 throughout - PROGRESSING  - Patient to increase hip strength to 4/5 throughout - PROGRESSING    Functional Goals: by discharge  - Patient to discharge to independent [facility] - PROGRESSING  - Patient to return to prior level of function - PROGRESSING  - Patient to improve knee AROM to equal to uninvolved side - PROGRESSING  - Patient to ambulate in home and community without increased pain or difficulty - PROGRESSING  - Patient to ascend and descend stairs without increased pain or difficulty - PROGRESSING  - Patient to complete sit to stand transfers without increased pain or difficulty - MET  - Patient to return to golf - PROGRESSING    Plan  Patient would benefit from: skilled physical therapy  Planned modality interventions: cryotherapy, TENS and thermotherapy: hydrocollator packs  Planned therapy interventions: flexibility, home exercise program, joint mobilization, manual therapy, neuromuscular re-education, patient education, strengthening, stretching, therapeutic activities, therapeutic exercise and functional ROM exercises  Frequency: 2x week  Duration in weeks: 8  Treatment plan discussed with: patient        Subjective Evaluation    History of  Present Illness  Mechanism of injury: HISTORY OF PRESENT ILLNESS: Patient presents s/p L TKA 24 and was discharged home same day. He is ambulating with SPC in community and is ambulating in home without AD. He feels his knee is strong and stable but continues have tightness with knee flexion. Pain has been better controlled and sleep quality is improving. Patient is improving with interventions. He is navigating stairs with step to step pattern however occasionally able to lead with L LE.  PRIOR TREATMENT: CSI, PT  AGGRAVATING FACTORS: prolonged sitting, driving, longer distance walking, stair navigation  EASING FACTORS: ice, medications  WORK: waste water management  (out of work)  FUNCTIONAL LIMITATIONS: ambulating with SPC, step to step stair navigation, recreational walking, driving, cycling, and golfing  IMPROVEMENTS: dressing, driving short distances, ambulating with SPC in community and without AD in home, sit to stand transfers, and toilet transfers  SUBJECTIVE FUNCTIONAL LEVEL: 35%  PATIENT GOAL: to be able to play golf  Pain  Current pain ratin  At best pain ratin  At worst pain ratin  Location: L knee          Objective     Observations     Additional Observation Details  Incision covered with bandage with no excessive redness, warmth or other signs of infection    Active Range of Motion   Left Knee   Flexion: 85 degrees   Extension: -4 degrees     Right Knee   Flexion: 145 degrees   Extension: -1 degrees     Passive Range of Motion   Left Knee   Flexion: 87 degrees   Extension: 0 degrees     Strength/Myotome Testing     Left Hip   Planes of Motion   Flexion: 4+    Right Hip   Planes of Motion   Flexion: 5  Extension: 4  Abduction: 4    Left Knee   Flexion: 2 (4 within ROM)  Extension: 3- (4 within ROM)  Quadriceps contraction: fair    Right Knee   Flexion: 5  Extension: 5  Quadriceps contraction: good    Left Ankle/Foot   Dorsiflexion: 5    Right Ankle/Foot   Dorsiflexion:  "5    Ambulation     Observational Gait     Additional Observational Gait Details  Ambulating with SPC with decreased knee flexion during swing      Flowsheet Rows      Flowsheet Row Most Recent Value   PT/OT G-Codes    Current Score 32   Projected Score 72                 Diagnosis: s/p L TKA 4/5/24   Precautions: none   Primary impairments: L knee ROM deficits, L knee strength deficits, and gait dysfunction   *asterisks by exercise = given for HEP    4/26 4/30 5/3 5/7 4/23   Manuals        L knee PROM and patellar mobilizations  19'  23'  22'   19'   L tibiofemoral mobilizations  4'  7'  8'   4'                           There Ex        Rec bike  1/2 revs x 8'  1/2 revs x 8'  1/2 revs x 8'  1/2 revs x 8'  1/2 revs x 8'   Heel slides *        Strap gastroc stretch        Seated knee flex stretch *      5\" x 10   Hip abd slides                        Neuro Re-Ed        Quad sets *      HEP   SAQ      5\" x 10   Bridges        Supine SLR *      2 x 5   S/L hip abd        Mini squats *   X 15  X 15     Heel/toe raises  X 15 ea  D/C    X 15 ea   Band TKE  Blue 5\" x 10  Blue 5\" x 10  D/C   Blue 5\" x 10   Forward/lat step ups                                        Re-evaluation      CM     Ther Act/Gait                                 Modalities           CP prn.  10'  10'  10'  10'  10'                                                  "

## 2024-05-07 NOTE — PROGRESS NOTES
PT Re-Evaluation     Today's date: 2024  Patient name: Wayne Mosley  : 1960  MRN: [id number]  Referring provider: Angelito Matos MD  Dx:   Encounter Diagnosis     ICD-10-CM    1. Acute pain of left knee  M25.562       2. S/P total knee arthroplasty, left  Z96.652           Start Time: 815  Stop Time: 928  Total time in clinic (min): 73 minutes    Assessment  Assessment details: Patient is a 64 y.o. male presenting s/p L TKA with date of surgery 24. Patient exhibits fair progress toward objective and functional goals at time of reexamination. Patient exhibits improvements with dressing, driving short distances, ambulating with SPC in community and without AD in home, sit to stand transfers, and toilet transfers since initiating PT however continues to have limitations compared to prior level of function. Remaining functional limitations include ambulating with SPC, step to step stair navigation, recreational walking, driving, cycling, and golfing. As a result of impairments patient has continued limitations with daily and functional activities and would benefit from continued skilled PT interventions to address these impairments in order to maximize function.       Impairments: abnormal muscle firing, abnormal or restricted ROM, abnormal movement, activity intolerance, impaired physical strength and pain with function     Prognosis: good    Goals  Impairment Goals: 4-6 weeks  - Patient to decrease pain to 0/10 - PROGRESSING  - Patient to improve knee PROM to equal to uninvolved side - PROGRESSING  - Patient to increase knee strength to 4/5 throughout - PROGRESSING  - Patient to increase hip strength to 4/5 throughout - PROGRESSING    Functional Goals: by discharge  - Patient to discharge to independent [facility] - PROGRESSING  - Patient to return to prior level of function - PROGRESSING  - Patient to improve knee AROM to equal to uninvolved side - PROGRESSING  - Patient to ambulate in home and  community without increased pain or difficulty - PROGRESSING  - Patient to ascend and descend stairs without increased pain or difficulty - PROGRESSING  - Patient to complete sit to stand transfers without increased pain or difficulty - MET  - Patient to return to golf - PROGRESSING    Plan  Patient would benefit from: skilled physical therapy  Planned modality interventions: cryotherapy, TENS and thermotherapy: hydrocollator packs  Planned therapy interventions: flexibility, home exercise program, joint mobilization, manual therapy, neuromuscular re-education, patient education, strengthening, stretching, therapeutic activities, therapeutic exercise and functional ROM exercises  Frequency: 2x week  Duration in weeks: 8  Treatment plan discussed with: patient        Subjective Evaluation    History of Present Illness  Mechanism of injury: HISTORY OF PRESENT ILLNESS: Patient presents s/p L TKA 24 and was discharged home same day. He is ambulating with SPC in community and is ambulating in home without AD. He feels his knee is strong and stable but continues have tightness with knee flexion. Pain has been better controlled and sleep quality is improving. Patient is improving with interventions. He is navigating stairs with step to step pattern however occasionally able to lead with L LE.  PRIOR TREATMENT: CSI, PT  AGGRAVATING FACTORS: prolonged sitting, driving, longer distance walking, stair navigation  EASING FACTORS: ice, medications  WORK: waste water management  (out of work)  FUNCTIONAL LIMITATIONS: ambulating with SPC, step to step stair navigation, recreational walking, driving, cycling, and golfing  IMPROVEMENTS: dressing, driving short distances, ambulating with SPC in community and without AD in home, sit to stand transfers, and toilet transfers  SUBJECTIVE FUNCTIONAL LEVEL: 35%  PATIENT GOAL: to be able to play golf  Pain  Current pain ratin  At best pain ratin  At worst pain rating:  "7  Location: L knee          Objective     Observations     Additional Observation Details  Incision covered with bandage with no excessive redness, warmth or other signs of infection    Active Range of Motion   Left Knee   Flexion: 85 degrees   Extension: -4 degrees     Right Knee   Flexion: 145 degrees   Extension: -1 degrees     Passive Range of Motion   Left Knee   Flexion: 87 degrees   Extension: 0 degrees     Strength/Myotome Testing     Left Hip   Planes of Motion   Flexion: 4+    Right Hip   Planes of Motion   Flexion: 5  Extension: 4  Abduction: 4    Left Knee   Flexion: 2 (4 within ROM)  Extension: 3- (4 within ROM)  Quadriceps contraction: fair    Right Knee   Flexion: 5  Extension: 5  Quadriceps contraction: good    Left Ankle/Foot   Dorsiflexion: 5    Right Ankle/Foot   Dorsiflexion: 5    Ambulation     Observational Gait     Additional Observational Gait Details  Ambulating with SPC with decreased knee flexion during swing      Flowsheet Rows      Flowsheet Row Most Recent Value   PT/OT G-Codes    Current Score 32   Projected Score 72                 Diagnosis: s/p L TKA 4/5/24   Precautions: none   Primary impairments: L knee ROM deficits, L knee strength deficits, and gait dysfunction   *asterisks by exercise = given for HEP    4/26 4/30 5/3 5/7 4/23   Manuals        L knee PROM and patellar mobilizations  19'  23'  22'   19'   L tibiofemoral mobilizations  4'  7'  8'   4'                           There Ex        Rec bike  1/2 revs x 8'  1/2 revs x 8'  1/2 revs x 8'  1/2 revs x 8'  1/2 revs x 8'   Heel slides *        Strap gastroc stretch        Seated knee flex stretch *      5\" x 10   Hip abd slides                        Neuro Re-Ed        Quad sets *      HEP   SAQ      5\" x 10   Bridges        Supine SLR *      2 x 5   S/L hip abd        Mini squats *   X 15  X 15     Heel/toe raises  X 15 ea  D/C    X 15 ea   Band TKE  Blue 5\" x 10  Blue 5\" x 10  D/C   Blue 5\" x 10   Forward/lat step ups      "                                   Re-evaluation      CM     Ther Act/Gait                                 Modalities           CP prn.  10'  10'  10'  10'  10'

## 2024-05-10 ENCOUNTER — OFFICE VISIT (OUTPATIENT)
Dept: PHYSICAL THERAPY | Facility: REHABILITATION | Age: 64
End: 2024-05-10
Payer: COMMERCIAL

## 2024-05-10 DIAGNOSIS — M25.562 ACUTE PAIN OF LEFT KNEE: Primary | ICD-10-CM

## 2024-05-10 DIAGNOSIS — Z96.652 S/P TOTAL KNEE ARTHROPLASTY, LEFT: ICD-10-CM

## 2024-05-10 PROCEDURE — 97110 THERAPEUTIC EXERCISES: CPT | Performed by: PHYSICAL THERAPIST

## 2024-05-10 PROCEDURE — 97140 MANUAL THERAPY 1/> REGIONS: CPT | Performed by: PHYSICAL THERAPIST

## 2024-05-10 NOTE — PROGRESS NOTES
Daily Note     Today's date: 5/10/2024  Patient name: Wayne Mosley  : 1960  MRN: [id number]  Referring provider: Angelito Matos MD  Dx:   Encounter Diagnosis     ICD-10-CM    1. Acute pain of left knee  M25.562       2. S/P total knee arthroplasty, left  Z96.652           Start Time: 0815  Stop Time: 0910  Total time in clinic (min): 55 minutes    Subjective: Patient had soreness for about 2 days after last visit. He has been compliant with HEP      Objective: See treatment diary below      Assessment: Tolerated treatment well. Initiated forward and lateral step ups with good quadriceps control. Knee flexion PROM measured at 90 degrees. Patient demonstrated fatigue post treatment, exhibited good technique with therapeutic exercises, and would benefit from continued PT      Plan: Continue per plan of care.      Diagnosis: s/p L TKA 24   Precautions: none   Primary impairments: L knee ROM deficits, L knee strength deficits, and gait dysfunction   *asterisks by exercise = given for HEP    4/26 4/30 5/3 5/7 5/10   Manuals        L knee PROM and patellar mobilizations  19'  23'  22'   22'   L tibiofemoral mobilizations  4'  7'  8'   8'                           There Ex        Rec bike  1/2 revs x 8'  1/2 revs x 8'  1/2 revs x 8'  1/2 revs x 8'  1/2 revs x 8'   Heel slides *        Strap gastroc stretch        Seated knee flex stretch *        Hip abd slides                        Neuro Re-Ed        Quad sets *        SAQ        Bridges        Supine SLR *        S/L hip abd        Mini squats *   X 15  X 15   X 20   Heel/toe raises  X 15 ea  D/C      Forward/lat step ups      1R x 15 ea                                   Re-evaluation      CM    Ther Act/Gait                               Modalities           CP prn.  10'  10'  10'  10'  10'

## 2024-05-14 ENCOUNTER — OFFICE VISIT (OUTPATIENT)
Dept: PHYSICAL THERAPY | Facility: REHABILITATION | Age: 64
End: 2024-05-14
Payer: COMMERCIAL

## 2024-05-14 DIAGNOSIS — Z96.652 S/P TOTAL KNEE ARTHROPLASTY, LEFT: ICD-10-CM

## 2024-05-14 DIAGNOSIS — M25.562 ACUTE PAIN OF LEFT KNEE: Primary | ICD-10-CM

## 2024-05-14 PROCEDURE — 97140 MANUAL THERAPY 1/> REGIONS: CPT | Performed by: PHYSICAL THERAPIST

## 2024-05-14 PROCEDURE — 97110 THERAPEUTIC EXERCISES: CPT | Performed by: PHYSICAL THERAPIST

## 2024-05-14 PROCEDURE — 97112 NEUROMUSCULAR REEDUCATION: CPT | Performed by: PHYSICAL THERAPIST

## 2024-05-14 NOTE — PROGRESS NOTES
Daily Note     Today's date: 2024  Patient name: Wayne Mosley  : 1960  MRN: [id number]  Referring provider: Valerie Bailey DO  Dx:   Encounter Diagnosis     ICD-10-CM    1. Acute pain of left knee  M25.562       2. S/P total knee arthroplasty, left  Z96.652           Start Time: 0815  Stop Time: 920  Total time in clinic (min): 65 minutes    Subjective: Patient reports he is more stiff and sore on arrival and is unsure why      Objective: See treatment diary below      Assessment: Tolerated treatment well. Muscle stretch end-feel at restricted end-range of flexion. Reviewed seated hamstring stretch and provided for HEP. Discussed importance of continued stretching at a high frequency at home. Good quadriceps control with forward and lateral step ups. Patient demonstrated fatigue post treatment, exhibited good technique with therapeutic exercises, and would benefit from continued PT      Plan: Continue per plan of care.      Diagnosis: s/p L TKA 24   Precautions: none   Primary impairments: L knee ROM deficits, L knee strength deficits, and gait dysfunction   *asterisks by exercise = given for HEP    5/14 4/30 5/3 5/7 5/10   Manuals        L knee PROM and patellar mobilizations  22'  23'  22'   25'   L tibiofemoral mobilizations  8'  7'  8'   8'                           There Ex        Rec bike  1/2 revs x 8'  1/2 revs x 8'  1/2 revs x 8'  1/2 revs x 8'  1/2 revs x 8'   Heel slides *        Strap gastroc stretch        Seated knee flex stretch *        Seated HS stretch *  Reviewed                        Neuro Re-Ed        Quad sets *        SAQ        Bridges        Supine SLR *        S/L hip abd        Mini squats *  X 20  X 15  X 15   X 20   Heel/toe raises   D/C      Forward/lat step ups  1R x 15 ea     1R x 15 ea                                   Re-evaluation      CM    Ther Act/Gait                               Modalities           CP prn.  10'  10'  10'  10'  10'                       No

## 2024-05-16 ENCOUNTER — OFFICE VISIT (OUTPATIENT)
Dept: PODIATRY | Facility: CLINIC | Age: 64
End: 2024-05-16
Payer: COMMERCIAL

## 2024-05-16 VITALS
HEIGHT: 69 IN | BODY MASS INDEX: 22.07 KG/M2 | HEART RATE: 74 BPM | WEIGHT: 149 LBS | RESPIRATION RATE: 18 BRPM | SYSTOLIC BLOOD PRESSURE: 89 MMHG | DIASTOLIC BLOOD PRESSURE: 65 MMHG

## 2024-05-16 DIAGNOSIS — M79.674 PAIN IN TOE OF RIGHT FOOT: ICD-10-CM

## 2024-05-16 DIAGNOSIS — L60.0 INGROWN TOENAIL: Primary | ICD-10-CM

## 2024-05-16 PROCEDURE — 99212 OFFICE O/P EST SF 10 MIN: CPT | Performed by: PODIATRIST

## 2024-05-16 NOTE — PROGRESS NOTES
Patient presents for pedal assessment.  Patient relates mild ingrown nail pain along the lateral nail borders of the right third and fourth toes.  His chief complaint though is slow healing from a left knee replacement.  His knee continues to throb and ache become awake at night.    As the digital pain from ingrown nails is minimal, advised patient to hold off on any procedures.  He will contact me should pain intensify.  Reappoint as needed

## 2024-05-17 ENCOUNTER — OFFICE VISIT (OUTPATIENT)
Dept: PHYSICAL THERAPY | Facility: REHABILITATION | Age: 64
End: 2024-05-17
Payer: COMMERCIAL

## 2024-05-17 DIAGNOSIS — Z96.652 S/P TOTAL KNEE ARTHROPLASTY, LEFT: ICD-10-CM

## 2024-05-17 DIAGNOSIS — M25.562 ACUTE PAIN OF LEFT KNEE: Primary | ICD-10-CM

## 2024-05-17 PROCEDURE — 97110 THERAPEUTIC EXERCISES: CPT | Performed by: PHYSICAL THERAPIST

## 2024-05-17 PROCEDURE — 97140 MANUAL THERAPY 1/> REGIONS: CPT | Performed by: PHYSICAL THERAPIST

## 2024-05-17 NOTE — PROGRESS NOTES
Daily Note     Today's date: 2024  Patient name: Wayne Mosley  : 1960  MRN: [id number]  Referring provider: Valerie Bailey DO  Dx:   Encounter Diagnosis     ICD-10-CM    1. Acute pain of left knee  M25.562       2. S/P total knee arthroplasty, left  Z96.652           Start Time: 0815  Stop Time: 0910  Total time in clinic (min): 55 minutes    Subjective: Patient felt sore after last visit but generally it feels better when he gets home and uses ice      Objective: See treatment diary below      Assessment: Tolerated treatment well. Knee flexion PROM measured at 100 degrees during manual therapy with soft tissue stretch end-feel. Gradually improving knee flexion each week with interventions. Patient demonstrated fatigue post treatment, exhibited good technique with therapeutic exercises, and would benefit from continued PT      Plan: Continue per plan of care.      Diagnosis: s/p L TKA 24   Precautions: none   Primary impairments: L knee ROM deficits, L knee strength deficits, and gait dysfunction   *asterisks by exercise = given for HEP    5/14 5/17 5/3 5/7 5/10   Manuals        L knee PROM and patellar mobilizations  22'  23'  22'   25'   L tibiofemoral mobilizations  8'  7'  8'   8'                           There Ex        Rec bike  1/2 revs x 8'  1/2 revs x 8'  1/2 revs x 8'  1/2 revs x 8'  1/2 revs x 8'   Heel slides *        Strap gastroc stretch        Seated knee flex stretch *        Seated HS stretch *  Reviewed                        Neuro Re-Ed        Quad sets *        SAQ        Bridges        Supine SLR *        S/L hip abd        Mini squats *  X 20  X 20  X 15   X 20   Heel/toe raises   D/C      Forward/lat step ups  1R x 15 ea  1R x 20 ea    1R x 15 ea                                   Re-evaluation      CM    Ther Act/Gait                               Modalities           CP prn.  10'  10'  10'  10'  10'

## 2024-05-20 ENCOUNTER — OFFICE VISIT (OUTPATIENT)
Dept: PHYSICAL THERAPY | Facility: REHABILITATION | Age: 64
End: 2024-05-20
Payer: COMMERCIAL

## 2024-05-20 DIAGNOSIS — Z96.652 S/P TOTAL KNEE ARTHROPLASTY, LEFT: ICD-10-CM

## 2024-05-20 DIAGNOSIS — M25.562 ACUTE PAIN OF LEFT KNEE: Primary | ICD-10-CM

## 2024-05-20 PROCEDURE — 97110 THERAPEUTIC EXERCISES: CPT | Performed by: PHYSICAL THERAPIST

## 2024-05-20 PROCEDURE — 97140 MANUAL THERAPY 1/> REGIONS: CPT | Performed by: PHYSICAL THERAPIST

## 2024-05-20 NOTE — PROGRESS NOTES
Daily Note     Today's date: 2024  Patient name: Wayne Mosley  : 1960  MRN: [id number]  Referring provider: Valerie Bailey DO  Dx:   Encounter Diagnosis     ICD-10-CM    1. Acute pain of left knee  M25.562       2. S/P total knee arthroplasty, left  Z96.652           Start Time: 0815  Stop Time: 0910  Total time in clinic (min): 55 minutes    Subjective: Patient reports he feels he is getting further on the bike and his HEP is going well. He reports calf tightness      Objective: See treatment diary below      Assessment: Tolerated treatment well. Updated HEP to include step gastrocnemius stretch and provided illustrated handout. Continued gradual improvements in knee flexion with interventions. Patient demonstrated fatigue post treatment, exhibited good technique with therapeutic exercises, and would benefit from continued PT      Plan: Continue per plan of care.      Diagnosis: s/p L TKA 24   Precautions: none   Primary impairments: L knee ROM deficits, L knee strength deficits, and gait dysfunction   *asterisks by exercise = given for HEP    5/14 5/17 5/20 5/7 5/10   Manuals        L knee PROM and patellar mobilizations  22'  23'  23'   25'   L tibiofemoral mobilizations  8'  7'  7'   8'                           There Ex        Rec bike  1/2 revs x 8'  1/2 revs x 8'  1/2 revs x 8'  1/2 revs x 8'  1/2 revs x 8'   Heel slides *        Step gastroc stretch *    Reviewed      Seated knee flex stretch *        Seated HS stretch *  Reviewed                        Neuro Re-Ed        Quad sets *        SAQ        Bridges        Supine SLR *        S/L hip abd        Mini squats *  X 20  X 20  X 20   X 20   Heel/toe raises   D/C      Forward/lat step ups  1R x 15 ea  1R x 20 ea  1R x 20 ea   1R x 15 ea                                   Re-evaluation      CM    Ther Act/Gait                               Modalities           CP prn.  10'  10'  10'  10'  10'

## 2024-05-24 ENCOUNTER — OFFICE VISIT (OUTPATIENT)
Dept: PHYSICAL THERAPY | Facility: REHABILITATION | Age: 64
End: 2024-05-24
Payer: COMMERCIAL

## 2024-05-24 DIAGNOSIS — Z96.652 S/P TOTAL KNEE ARTHROPLASTY, LEFT: ICD-10-CM

## 2024-05-24 DIAGNOSIS — M25.562 ACUTE PAIN OF LEFT KNEE: Primary | ICD-10-CM

## 2024-05-24 PROCEDURE — 97112 NEUROMUSCULAR REEDUCATION: CPT | Performed by: PHYSICAL THERAPIST

## 2024-05-24 PROCEDURE — 97140 MANUAL THERAPY 1/> REGIONS: CPT | Performed by: PHYSICAL THERAPIST

## 2024-05-24 PROCEDURE — 97110 THERAPEUTIC EXERCISES: CPT | Performed by: PHYSICAL THERAPIST

## 2024-05-24 NOTE — PROGRESS NOTES
Daily Note     Today's date: 2024  Patient name: Wayne Mosley  : 1960  MRN: [id number]  Referring provider: Valerie Bailey DO  Dx:   Encounter Diagnosis     ICD-10-CM    1. Acute pain of left knee  M25.562       2. S/P total knee arthroplasty, left  Z96.652           Start Time: 0815  Stop Time: 09  Total time in clinic (min): 53 minutes    Subjective: Patient saw the surgeon and he would like to continue to address ROM/strength as well as gait. Overall he is pleased with patient's progress      Objective: See treatment diary below      Assessment: Tolerated treatment well. Initiated low load long duration knee extension stretch and updated HEP to include. Reviewed HEP and provided handout with exercises to emphasize at home. Continually improving knee flexion and extension ROM with interventions. Patient demonstrated fatigue post treatment, exhibited good technique with therapeutic exercises, and would benefit from continued PT      Plan: Continue per plan of care.      Diagnosis: s/p L TKA 24   Precautions: none   Primary impairments: L knee ROM deficits, L knee strength deficits, and gait dysfunction   *asterisks by exercise = given for HEP     5/10   Manuals        L knee PROM and patellar mobilizations  22'  23'  23'  16'  25'   L tibiofemoral mobilizations  8'  7'  7'  7'  8'                           There Ex        Rec bike  1/2 revs x 8'  1/2 revs x 8'  1/2 revs x 8'  Upright 1/2 revs x 8'  1/2 revs x 8'   Heel slides *        Step gastroc stretch *    Reviewed      Seated knee flex stretch *        Seated HS stretch *  Reviewed        Supine extension hang *     3'                    Neuro Re-Ed        Quad sets *        SAQ        Bridges        Supine SLR *        S/L hip abd        Mini squats *  X 20  X 20  X 20  HEP  X 20   Forward/lat step ups  1R x 15 ea  1R x 20 ea  1R x 20 ea  1R x 20 ea  1R x 15 ea                                   Re-evaluation          Ther Act/Gait                               Modalities           CP prn.  10'  10'  10'  10'  10'

## 2024-05-29 ENCOUNTER — OFFICE VISIT (OUTPATIENT)
Dept: PHYSICAL THERAPY | Facility: REHABILITATION | Age: 64
End: 2024-05-29
Payer: COMMERCIAL

## 2024-05-29 DIAGNOSIS — Z96.652 S/P TOTAL KNEE ARTHROPLASTY, LEFT: ICD-10-CM

## 2024-05-29 DIAGNOSIS — M25.562 ACUTE PAIN OF LEFT KNEE: Primary | ICD-10-CM

## 2024-05-29 PROCEDURE — 97140 MANUAL THERAPY 1/> REGIONS: CPT | Performed by: PHYSICAL THERAPIST

## 2024-05-29 PROCEDURE — 97110 THERAPEUTIC EXERCISES: CPT | Performed by: PHYSICAL THERAPIST

## 2024-05-29 PROCEDURE — 97112 NEUROMUSCULAR REEDUCATION: CPT | Performed by: PHYSICAL THERAPIST

## 2024-05-29 NOTE — PROGRESS NOTES
Daily Note     Today's date: 2024  Patient name: Wayne Mosley  : 1960  MRN: [id number]  Referring provider: Valerie Bailey DO  Dx:   Encounter Diagnosis     ICD-10-CM    1. Acute pain of left knee  M25.562       2. S/P total knee arthroplasty, left  Z96.652           Start Time: 08  Stop Time: 910  Total time in clinic (min): 50 minutes    Subjective: Patient reports he feels he is getting better however things are moving slowly      Objective: See treatment diary below      Assessment: Tolerated treatment well. Initiated unilateral leg press with slightly increased difficulty involved compared to uninvolved side as expected. Knee flexion measured at 102 degrees during manual PROM. Patient demonstrated fatigue post treatment, exhibited good technique with therapeutic exercises, and would benefit from continued PT      Plan: Continue per plan of care.      Diagnosis: s/p L TKA 24   Precautions: none   Primary impairments: L knee ROM deficits, L knee strength deficits, and gait dysfunction   *asterisks by exercise = given for HEP       Manuals        L knee PROM and patellar mobilizations  22'  23'  23'  16'  16'   L tibiofemoral mobilizations  8'  7'  7'  7'  7'                           There Ex        Rec bike  1/2 revs x 8'  1/2 revs x 8'  1/2 revs x 8'  Upright 1/2 revs x 8'  1/2 revs x 8'   Heel slides *        Step gastroc stretch *    Reviewed      Seated knee flex stretch *        Seated HS stretch *  Reviewed        Supine extension hang *     3'                    Neuro Re-Ed        Quad sets *        SAQ        Bridges        Supine SLR *        S/L hip abd        Mini squats *  X 20  X 20  X 20  HEP    Forward/lat step ups  1R x 15 ea  1R x 20 ea  1R x 20 ea  1R x 20 ea  1R x 20 ea   U/L leg press      45 lbs 2 x 10                                   Re-evaluation         Ther Act/Gait                               Modalities           CP prn.  10'  10'  10'   10'  10'

## 2024-05-31 ENCOUNTER — OFFICE VISIT (OUTPATIENT)
Dept: PHYSICAL THERAPY | Facility: REHABILITATION | Age: 64
End: 2024-05-31
Payer: COMMERCIAL

## 2024-05-31 DIAGNOSIS — M25.562 ACUTE PAIN OF LEFT KNEE: Primary | ICD-10-CM

## 2024-05-31 DIAGNOSIS — Z96.652 S/P TOTAL KNEE ARTHROPLASTY, LEFT: ICD-10-CM

## 2024-05-31 PROCEDURE — 97140 MANUAL THERAPY 1/> REGIONS: CPT | Performed by: PHYSICAL THERAPIST

## 2024-05-31 PROCEDURE — 97110 THERAPEUTIC EXERCISES: CPT | Performed by: PHYSICAL THERAPIST

## 2024-05-31 NOTE — PROGRESS NOTES
Daily Note     Today's date: 2024  Patient name: Wayne Mosley  : 1960  MRN: [id number]  Referring provider: Valerie Bailey DO  Dx:   Encounter Diagnosis     ICD-10-CM    1. Acute pain of left knee  M25.562       2. S/P total knee arthroplasty, left  Z96.652           Start Time: 0815  Stop Time: 0910  Total time in clinic (min): 55 minutes    Subjective: Patient feels he is having a good day today and his knee feels looser      Objective: See treatment diary below      Assessment: Tolerated treatment well. Progressed step ups to step overs with difficulty controlling full descent due to knee flexion ROM deficits. Gradually improving knee flexion mobility with manual therapy with soft tissue stretch end-feel. Patient demonstrated fatigue post treatment, exhibited good technique with therapeutic exercises, and would benefit from continued PT      Plan: Continue per plan of care.      Diagnosis: s/p L TKA 24   Precautions: none   Primary impairments: L knee ROM deficits, L knee strength deficits, and gait dysfunction   *asterisks by exercise = given for HEP       Manuals        L knee PROM and patellar mobilizations  22'  23'  23'  16'  16'   L tibiofemoral mobilizations  8'  7'  7'  7'  7'                           There Ex        Rec bike  1/2 revs x 8'  1/2 revs x 8'  1/2 revs x 8'  Upright 1/2 revs x 8'  1/2 revs x 8'   Heel slides *        Step gastroc stretch *    Reviewed      Seated knee flex stretch *        Seated HS stretch *        Supine extension hang *     3'                    Neuro Re-Ed        Quad sets *        SAQ        Bridges        Supine SLR *        S/L hip abd        Mini squats *   X 20  X 20  HEP    Forward/lat step overs  2R x 15 ea  1R x 20 ea  1R x 20 ea  1R x 20 ea  1R x 20 ea   U/L leg press  55 lbs 2 x 10     45 lbs 2 x 10                                   Re-evaluation         Ther Act/Gait                               Modalities            CP prn.  10'  10'  10'  10'  10'

## 2024-06-04 ENCOUNTER — EVALUATION (OUTPATIENT)
Dept: PHYSICAL THERAPY | Facility: REHABILITATION | Age: 64
End: 2024-06-04
Payer: COMMERCIAL

## 2024-06-04 DIAGNOSIS — M25.562 ACUTE PAIN OF LEFT KNEE: Primary | ICD-10-CM

## 2024-06-04 DIAGNOSIS — Z96.652 S/P TOTAL KNEE ARTHROPLASTY, LEFT: ICD-10-CM

## 2024-06-04 PROCEDURE — 97110 THERAPEUTIC EXERCISES: CPT | Performed by: PHYSICAL THERAPIST

## 2024-06-04 PROCEDURE — 97112 NEUROMUSCULAR REEDUCATION: CPT | Performed by: PHYSICAL THERAPIST

## 2024-06-04 NOTE — LETTER
2024    Angelito Matos MD  [address] [place] [id number]    Patient: Wayne Mosley   YOB: 1960   Date of Visit: 2024     Encounter Diagnosis     ICD-10-CM    1. Acute pain of left knee  M25.562       2. S/P total knee arthroplasty, left  Z96.652           Dear Dr. Matos:    Thank you for your recent referral of Wayne Mosley. Please review the attached evaluation summary from Wayne's recent visit.     Please verify that you agree with the plan of care by signing the attached order.     If you have any questions or concerns, please do not hesitate to call.     I sincerely appreciate the opportunity to share in the care of one of your patients and hope to have another opportunity to work with you in the near future.       Sincerely,    Danny Tay, PT      Referring Provider:      I certify that I have read the below Plan of Care and certify the need for these services furnished under this plan of treatment while under my care.                    Angelito Matos MD  [address] [place] [address]  Via Fax: [phone]          PT Re-Evaluation     Today's date: 2024  Patient name: Wayne Mosley  : 1960  MRN: [id number]  Referring provider: Angelito Matos MD  Dx:   Encounter Diagnosis     ICD-10-CM    1. Acute pain of left knee  M25.562       2. S/P total knee arthroplasty, left  Z96.652           Start Time: 0815  Stop Time: 0910  Total time in clinic (min): 55 minutes    Assessment  Impairments: abnormal muscle firing, abnormal or restricted ROM, abnormal movement, activity intolerance, impaired physical strength and pain with function    Assessment details: Patient is a 64 y.o. male presenting s/p L TKA with date of surgery 24. Patient exhibits fair progress toward objective and functional goals at time of reexamination. Patient exhibits improvements with dressing, driving, sitting tolerance, ambulating with SPC in community and  without AD in home, sit to stand transfers, toilet transfers, and ability to swing golf club since initiating PT however continues to have limitations compared to prior level of function. Remaining functional limitations include ambulating with SPC, step to step stair navigation, recreational walking, cycling, and golfing. As a result of impairments patient has continued limitations with daily and functional activities and would benefit from continued skilled PT interventions to address these impairments in order to maximize function.      Prognosis: good    Goals  Impairment Goals: 4-6 weeks  - Patient to decrease pain to 0/10 - PROGRESSING  - Patient to improve knee PROM to equal to uninvolved side - PROGRESSING  - Patient to increase knee strength to 4/5 throughout - PROGRESSING  - Patient to increase hip strength to 4/5 throughout - PROGRESSING    Functional Goals: by discharge  - Patient to discharge to independent HEP - PROGRESSING  - Patient to return to prior level of function - PROGRESSING  - Patient to improve knee AROM to equal to uninvolved side - PROGRESSING  - Patient to ambulate in home and community without increased pain or difficulty - PROGRESSING  - Patient to ascend and descend stairs without increased pain or difficulty - PROGRESSING  - Patient to complete sit to stand transfers without increased pain or difficulty - MET  - Patient to return to golf - PROGRESSING    Plan  Patient would benefit from: skilled physical therapy  Planned modality interventions: cryotherapy, TENS and thermotherapy: hydrocollator packs    Planned therapy interventions: flexibility, home exercise program, joint mobilization, manual therapy, neuromuscular re-education, patient education, strengthening, stretching, therapeutic activities, therapeutic exercise and functional ROM exercises    Frequency: 2x week  Duration in weeks: 6  Treatment plan discussed with: patient        Subjective Evaluation    History of Present  Illness  Mechanism of injury: HISTORY OF PRESENT ILLNESS: Patient presents s/p L TKA 24 and was discharged home same day. He is able to complete steps with step to step pattern leading with L LE. He is ambulating with SPC in community and without AD at home. Patient feels he is improving functionally with PT. He continues to be limited with longer distance ambulation and has tightness into knee flexion.  PRIOR TREATMENT: CSI, PT  AGGRAVATING FACTORS: walking long distances  EASING FACTORS: ice  WORK: waste water management  (out of work)  FUNCTIONAL LIMITATIONS: ambulating with SPC, step to step stair navigation, recreational walking, cycling, and golfing  IMPROVEMENTS: dressing, driving, sitting tolerance, ambulating with SPC in community and without AD in home, sit to stand transfers, toilet transfers, and ability to swing golf club  SUBJECTIVE FUNCTIONAL LEVEL: 65%  PATIENT GOAL: to be able to play golf  Pain  Current pain ratin  At best pain ratin  At worst pain ratin  Location: L knee          Objective     Active Range of Motion   Left Knee   Flexion: 97 degrees   Extension: 0 degrees     Right Knee   Flexion: 145 degrees   Extension: -1 degrees     Passive Range of Motion   Left Knee   Flexion: 104 degrees   Extension: 0 degrees     Strength/Myotome Testing     Left Hip   Planes of Motion   Flexion: 5  Extension: 3+  Abduction: 4    Right Hip   Planes of Motion   Flexion: 5  Extension: 4  Abduction: 5    Left Knee   Flexion: 3- (4 within ROM)  Extension: 3- (4+ within ROM)  Quadriceps contraction: good    Right Knee   Flexion: 5  Extension: 5  Quadriceps contraction: good    Left Ankle/Foot   Dorsiflexion: 5    Right Ankle/Foot   Dorsiflexion: 5    Additional Strength Details  R SLR: 5  L SLR: 4    Ambulation     Observational Gait     Additional Observational Gait Details  Ambulating with SPC with decreased knee flexion during swing  Neuro Exam:     Functional outcomes   5x sit to  stand: 11.7 (seconds)  TU.7 (seconds)                 Diagnosis: s/p L TKA 24   Precautions: none   Primary impairments: L knee ROM deficits, L knee strength deficits, and gait dysfunction   *asterisks by exercise = given for HEP       Manuals        L knee PROM and patellar mobilizations  22'   23'  16'  16'   L tibiofemoral mobilizations  8'   7'  7'  7'                           There Ex        Rec bike  1/2 revs x 8'  L0 x 8'  1/2 revs x 8'  Upright 1/2 revs x 8'  1/2 revs x 8'   Heel slides *        Step gastroc stretch *    Reviewed      Seated knee flex stretch *        Seated HS stretch *        Supine extension hang *     3'                    Neuro Re-Ed        Quad sets *        SAQ        Bridges        Supine SLR *        S/L hip abd        Mini squats *    X 20  HEP    Forward/lat step overs  2R x 15 ea   1R x 20 ea  1R x 20 ea  1R x 20 ea   U/L leg press  55 lbs 2 x 10     45 lbs 2 x 10                                   Re-evaluation   CM      Ther Act/Gait                             Modalities           CP prn.  10'  10'  10'  10'  10'

## 2024-06-04 NOTE — PROGRESS NOTES
PT Re-Evaluation     Today's date: 2024  Patient name: Wayne Mosley  : 1960  MRN: [id number]  Referring provider: Angelito Matos MD  Dx:   Encounter Diagnosis     ICD-10-CM    1. Acute pain of left knee  M25.562       2. S/P total knee arthroplasty, left  Z96.652           Start Time: 0815  Stop Time: 0910  Total time in clinic (min): 55 minutes    Assessment  Impairments: abnormal muscle firing, abnormal or restricted ROM, abnormal movement, activity intolerance, impaired physical strength and pain with function    Assessment details: Patient is a 64 y.o. male presenting s/p L TKA with date of surgery 24. Patient exhibits fair progress toward objective and functional goals at time of reexamination. Patient exhibits improvements with dressing, driving, sitting tolerance, ambulating with SPC in community and without AD in home, sit to stand transfers, toilet transfers, and ability to swing golf club since initiating PT however continues to have limitations compared to prior level of function. Remaining functional limitations include ambulating with SPC, step to step stair navigation, recreational walking, cycling, and golfing. As a result of impairments patient has continued limitations with daily and functional activities and would benefit from continued skilled PT interventions to address these impairments in order to maximize function.      Prognosis: good    Goals  Impairment Goals: 4-6 weeks  - Patient to decrease pain to 0/10 - PROGRESSING  - Patient to improve knee PROM to equal to uninvolved side - PROGRESSING  - Patient to increase knee strength to 4/5 throughout - PROGRESSING  - Patient to increase hip strength to 4/5 throughout - PROGRESSING    Functional Goals: by discharge  - Patient to discharge to independent [facility] - PROGRESSING  - Patient to return to prior level of function - PROGRESSING  - Patient to improve knee AROM to equal to uninvolved side - PROGRESSING  - Patient to  ambulate in home and community without increased pain or difficulty - PROGRESSING  - Patient to ascend and descend stairs without increased pain or difficulty - PROGRESSING  - Patient to complete sit to stand transfers without increased pain or difficulty - MET  - Patient to return to golf - PROGRESSING    Plan  Patient would benefit from: skilled physical therapy  Planned modality interventions: cryotherapy, TENS and thermotherapy: hydrocollator packs    Planned therapy interventions: flexibility, home exercise program, joint mobilization, manual therapy, neuromuscular re-education, patient education, strengthening, stretching, therapeutic activities, therapeutic exercise and functional ROM exercises    Frequency: 2x week  Duration in weeks: 6  Treatment plan discussed with: patient        Subjective Evaluation    History of Present Illness  Mechanism of injury: HISTORY OF PRESENT ILLNESS: Patient presents s/p L TKA 24 and was discharged home same day. He is able to complete steps with step to step pattern leading with L LE. He is ambulating with SPC in community and without AD at home. Patient feels he is improving functionally with PT. He continues to be limited with longer distance ambulation and has tightness into knee flexion.  PRIOR TREATMENT: CSI, PT  AGGRAVATING FACTORS: walking long distances  EASING FACTORS: ice  WORK: waste water management  (out of work)  FUNCTIONAL LIMITATIONS: ambulating with SPC, step to step stair navigation, recreational walking, cycling, and golfing  IMPROVEMENTS: dressing, driving, sitting tolerance, ambulating with SPC in community and without AD in home, sit to stand transfers, toilet transfers, and ability to swing golf club  SUBJECTIVE FUNCTIONAL LEVEL: 65%  PATIENT GOAL: to be able to play golf  Pain  Current pain ratin  At best pain ratin  At worst pain ratin  Location: L knee          Objective     Active Range of Motion   Left Knee   Flexion: 97  degrees   Extension: 0 degrees     Right Knee   Flexion: 145 degrees   Extension: -1 degrees     Passive Range of Motion   Left Knee   Flexion: 104 degrees   Extension: 0 degrees     Strength/Myotome Testing     Left Hip   Planes of Motion   Flexion: 5  Extension: 3+  Abduction: 4    Right Hip   Planes of Motion   Flexion: 5  Extension: 4  Abduction: 5    Left Knee   Flexion: 3- (4 within ROM)  Extension: 3- (4+ within ROM)  Quadriceps contraction: good    Right Knee   Flexion: 5  Extension: 5  Quadriceps contraction: good    Left Ankle/Foot   Dorsiflexion: 5    Right Ankle/Foot   Dorsiflexion: 5    Additional Strength Details  R SLR: 5  L SLR: 4    Ambulation     Observational Gait     Additional Observational Gait Details  Ambulating with SPC with decreased knee flexion during swing  Neuro Exam:     Functional outcomes   5x sit to stand: 11.7 (seconds)  TU.7 (seconds)                 Diagnosis: s/p L TKA 24   Precautions: none   Primary impairments: L knee ROM deficits, L knee strength deficits, and gait dysfunction   *asterisks by exercise = given for HEP       Manuals        L knee PROM and patellar mobilizations  22'   23'  16'  16'   L tibiofemoral mobilizations  8'   7'  7'  7'                           There Ex        Rec bike  1/2 revs x 8'  L0 x 8'  1/2 revs x 8'  Upright 1/2 revs x 8'  1/2 revs x 8'   Heel slides *        Step gastroc stretch *    Reviewed      Seated knee flex stretch *        Seated HS stretch *        Supine extension hang *     3'                    Neuro Re-Ed        Quad sets *        SAQ        Bridges        Supine SLR *        S/L hip abd        Mini squats *    X 20  HEP    Forward/lat step overs  2R x 15 ea   1R x 20 ea  1R x 20 ea  1R x 20 ea   U/L leg press  55 lbs 2 x 10     45 lbs 2 x 10                                   Re-evaluation   CM      Ther Act/Gait                             Modalities           CP prn.  10'  10'  10'  10'  10'

## 2024-06-07 ENCOUNTER — OFFICE VISIT (OUTPATIENT)
Dept: PHYSICAL THERAPY | Facility: REHABILITATION | Age: 64
End: 2024-06-07
Payer: COMMERCIAL

## 2024-06-07 DIAGNOSIS — M25.562 ACUTE PAIN OF LEFT KNEE: Primary | ICD-10-CM

## 2024-06-07 DIAGNOSIS — Z96.652 S/P TOTAL KNEE ARTHROPLASTY, LEFT: ICD-10-CM

## 2024-06-07 PROCEDURE — 97112 NEUROMUSCULAR REEDUCATION: CPT | Performed by: PHYSICAL THERAPIST

## 2024-06-07 PROCEDURE — 97140 MANUAL THERAPY 1/> REGIONS: CPT | Performed by: PHYSICAL THERAPIST

## 2024-06-07 PROCEDURE — 97110 THERAPEUTIC EXERCISES: CPT | Performed by: PHYSICAL THERAPIST

## 2024-06-07 NOTE — PROGRESS NOTES
Daily Note     Today's date: 2024  Patient name: Wayne Mosley  : 1960  MRN: [id number]  Referring provider: Angelito Matos MD  Dx:   Encounter Diagnosis     ICD-10-CM    1. Acute pain of left knee  M25.562       2. S/P total knee arthroplasty, left  Z96.652           Start Time: 0815  Stop Time: 0910  Total time in clinic (min): 55 minutes    Subjective: Patient reports he felt sore and stiff after the reassessment last visit. His walking is coming around slowly      Objective: See treatment diary below      Assessment: Tolerated treatment well. Initiated clock drill taps and forward static lunges with good strength. Educated patient regarding appropriate knee flexion during swing phase of gait and provided verbal cues to improve gait and stair navigation. Knee extension ROM approaching full during manual therapy. Patient exhibited good technique with therapeutic exercises and would benefit from continued PT      Plan: Continue per plan of care.      Diagnosis: s/p L TKA 24   Precautions: none   Primary impairments: L knee ROM deficits, L knee strength deficits, and gait dysfunction   *asterisks by exercise = given for HEP       Manuals        L knee PROM and patellar mobilizations  22'   16'  16'  16'   L tibiofemoral mobilizations  8'   7'  7'  7'                           There Ex        Rec bike  1/2 revs x 8'  L0 x 8'  L0 x 8'  Upright 1/2 revs x 8'  1/2 revs x 8'   Heel slides *        Step gastroc stretch *        Seated knee flex stretch *        Seated HS stretch *        Supine extension hang *     3'                    Neuro Re-Ed        Quad sets *        SAQ        Bridges        Supine SLR *        S/L hip abd        Mini squats *     HEP    Forward/lat step overs  2R x 15 ea    1R x 20 ea  1R x 20 ea   U/L leg press  55 lbs 2 x 10     45 lbs 2 x 10   Clock drill taps    X 10 B     Fwd static lunges    X 15 B                             Re-evaluation   CM       Ther Act/Gait                             Modalities           CP prn.  10'  10'  10'  10'  10'

## 2024-06-11 ENCOUNTER — OFFICE VISIT (OUTPATIENT)
Dept: PHYSICAL THERAPY | Facility: REHABILITATION | Age: 64
End: 2024-06-11
Payer: COMMERCIAL

## 2024-06-11 DIAGNOSIS — M25.562 ACUTE PAIN OF LEFT KNEE: Primary | ICD-10-CM

## 2024-06-11 DIAGNOSIS — Z96.652 S/P TOTAL KNEE ARTHROPLASTY, LEFT: ICD-10-CM

## 2024-06-11 PROCEDURE — 97112 NEUROMUSCULAR REEDUCATION: CPT | Performed by: PHYSICAL THERAPIST

## 2024-06-11 PROCEDURE — 97110 THERAPEUTIC EXERCISES: CPT | Performed by: PHYSICAL THERAPIST

## 2024-06-11 PROCEDURE — 97140 MANUAL THERAPY 1/> REGIONS: CPT | Performed by: PHYSICAL THERAPIST

## 2024-06-11 NOTE — PROGRESS NOTES
Daily Note     Today's date: 2024  Patient name: Wayne Mosley  : 1960  MRN: [id number]  Referring provider: Valerie Bailey DO  Dx:   Encounter Diagnosis     ICD-10-CM    1. Acute pain of left knee  M25.562       2. S/P total knee arthroplasty, left  Z96.652           Start Time: 0815  Stop Time: 0910  Total time in clinic (min): 55 minutes    Subjective: Patient reports he has been stiff and sore for the past couple days. He has been able to sleep on the couch and is going to try sleeping in bed tonight      Objective: See treatment diary below      Assessment: Tolerated treatment well. Updated HEP to include clock drill taps. Full knee extension PROM appreciated during manual therapy. Cueing to complete lunges with symmetrical weight distribution however patient able to complete with good technique. Patient demonstrated fatigue post treatment, exhibited good technique with therapeutic exercises, and would benefit from continued PT      Plan: Continue per plan of care.      Diagnosis: s/p L TKA 24   Precautions: none   Primary impairments: L knee ROM deficits, L knee strength deficits, and gait dysfunction   *asterisks by exercise = given for HEP       Manuals        L knee PROM and patellar mobilizations  22'   16'  16'  16'   L tibiofemoral mobilizations  8'   7'  7'  7'                           There Ex        Rec bike  1/2 revs x 8'  L0 x 8'  L0 x 8'  L0 x 8'  1/2 revs x 8'   Heel slides *        Step gastroc stretch *        Seated knee flex stretch *        Seated HS stretch *        Supine extension hang *                        Neuro Re-Ed        Quad sets *        SAQ        Bridges        Supine SLR *        S/L hip abd        Mini squats *        Forward/lat step overs  2R x 15 ea     1R x 20 ea   U/L leg press  55 lbs 2 x 10    55 lbs 2 x 10  45 lbs 2 x 10   Clock drill taps *    X 10 B  X 10 B    Fwd static lunges    X 15 B  X 15 B                             Re-evaluation   CM      Ther Act/Gait                             Modalities           CP prn.  10'  10'  10'  10'  10'

## 2024-06-12 ENCOUNTER — OFFICE VISIT (OUTPATIENT)
Dept: PODIATRY | Facility: CLINIC | Age: 64
End: 2024-06-12
Payer: COMMERCIAL

## 2024-06-12 ENCOUNTER — TELEPHONE (OUTPATIENT)
Dept: FAMILY MEDICINE CLINIC | Facility: CLINIC | Age: 64
End: 2024-06-12

## 2024-06-12 VITALS — DIASTOLIC BLOOD PRESSURE: 80 MMHG | SYSTOLIC BLOOD PRESSURE: 126 MMHG | HEART RATE: 74 BPM

## 2024-06-12 DIAGNOSIS — L60.0 INGROWN TOENAIL: Primary | ICD-10-CM

## 2024-06-12 DIAGNOSIS — M79.674 PAIN IN TOE OF RIGHT FOOT: ICD-10-CM

## 2024-06-12 DIAGNOSIS — R93.1 ELEVATED CORONARY ARTERY CALCIUM SCORE: Primary | ICD-10-CM

## 2024-06-12 PROCEDURE — 11730 AVULSION NAIL PLATE SIMPLE 1: CPT | Performed by: PODIATRIST

## 2024-06-12 RX ORDER — LIDOCAINE HYDROCHLORIDE AND EPINEPHRINE 10; 10 MG/ML; UG/ML
1 INJECTION, SOLUTION INFILTRATION; PERINEURAL ONCE
Status: COMPLETED | OUTPATIENT
Start: 2024-06-12 | End: 2024-06-12

## 2024-06-12 RX ORDER — LIDOCAINE HYDROCHLORIDE 10 MG/ML
1 INJECTION, SOLUTION EPIDURAL; INFILTRATION; INTRACAUDAL; PERINEURAL ONCE
Status: COMPLETED | OUTPATIENT
Start: 2024-06-12 | End: 2024-06-12

## 2024-06-12 RX ADMIN — LIDOCAINE HYDROCHLORIDE 1 ML: 10 INJECTION, SOLUTION EPIDURAL; INFILTRATION; INTRACAUDAL; PERINEURAL at 11:14

## 2024-06-12 RX ADMIN — LIDOCAINE HYDROCHLORIDE AND EPINEPHRINE 1 ML: 10; 10 INJECTION, SOLUTION INFILTRATION; PERINEURAL at 11:15

## 2024-06-12 NOTE — TELEPHONE ENCOUNTER
Call patient to notify him that I placed orders for his stress test and echocardiogram.  He could call central scheduling to set these up

## 2024-06-12 NOTE — PROGRESS NOTES
Patient presents with continued pain along the lateral nail border of the right third toe due to an ingrown nail.  This has bothered him for quite some time but his left knee has finally responded to replacement and is not causing him significant pain.    Discussed treatment options recommending partial avulsion.  The patient is aware that the ingrown nail will return in approximately 4-6 months with this procedure.  Verbal consent was given.  A partial matrixectomy may be necessary in the future.  Anesthesia via 2 cc of a 1:1 mixture of 1 percent xylocaine with epinephrine and 1 percent xylocaine plain.  Betadine prep was performed.  The lateral nail border of the right third toe was avulsed to the eponychium.  A bacitracin dressing was applied.  The patient is to soak in warm water twice a day followed by a Neosporin dressing tomorrow.      Nail removal    Date/Time: 6/12/2024 9:45 AM    Performed by: Tray Saldana DPM  Authorized by: Tray Saldana DPM    Patient location:  [facility] Protocol:  Consent: Verbal consent obtained.  Risks and benefits: risks, benefits and alternatives were discussed  Consent given by: patient  Patient understanding: patient states understanding of the procedure being performed    Location:     Foot:  R third toe  Pre-procedure details:     Skin preparation:  Betadine    Preparation: Patient was prepped and draped in the usual sterile fashion    Anesthesia (see MAR for exact dosages):     Anesthesia method:  Nerve block    Block needle gauge:  25 G    Block anesthetic:  Lidocaine 1% WITH epi and lidocaine 1% w/o epi    Block injection procedure:  Anatomic landmarks identified    Block outcome:  Anesthesia achieved  Nail Removal:     Nail removed:  Partial    Nail side:  Lateral    Nail bed sutured: no    Ingrown nail:     Wedge excision of skin: no      Nail matrix removed or ablated:  None  Post-procedure details:     Dressing:  4x4 sterile gauze, antibiotic ointment and  gauze roll    Patient tolerance of procedure:  Tolerated well, no immediate complications

## 2024-06-14 ENCOUNTER — OFFICE VISIT (OUTPATIENT)
Dept: PHYSICAL THERAPY | Facility: REHABILITATION | Age: 64
End: 2024-06-14
Payer: COMMERCIAL

## 2024-06-14 DIAGNOSIS — Z96.652 S/P TOTAL KNEE ARTHROPLASTY, LEFT: ICD-10-CM

## 2024-06-14 DIAGNOSIS — M25.562 ACUTE PAIN OF LEFT KNEE: Primary | ICD-10-CM

## 2024-06-14 PROCEDURE — 97112 NEUROMUSCULAR REEDUCATION: CPT | Performed by: PHYSICAL THERAPIST

## 2024-06-14 PROCEDURE — 97110 THERAPEUTIC EXERCISES: CPT | Performed by: PHYSICAL THERAPIST

## 2024-06-14 PROCEDURE — 97140 MANUAL THERAPY 1/> REGIONS: CPT | Performed by: PHYSICAL THERAPIST

## 2024-06-14 NOTE — PROGRESS NOTES
Daily Note     Today's date: 2024  Patient name: Wayne Mosley  : 1960  MRN: [id number]  Referring provider: Valerie Bailey DO  Dx:   Encounter Diagnosis     ICD-10-CM    1. Acute pain of left knee  M25.562       2. S/P total knee arthroplasty, left  Z96.652           Start Time: 0815  Stop Time: 0910  Total time in clinic (min): 55 minutes    Subjective: Patient reports he felt fine after the last visit. He has been able to sleep in bed all week      Objective: See treatment diary below      Assessment: Tolerated treatment well. Initiated lateral test the herrera with no adverse responses. Knee flexion PROM measured at 111 degrees with manual therapy. Patient demonstrated fatigue post treatment, exhibited good technique with therapeutic exercises, and would benefit from continued PT      Plan: Continue per plan of care.      Diagnosis: s/p L TKA 24   Precautions: none   Primary impairments: L knee ROM deficits, L knee strength deficits, and gait dysfunction   *asterisks by exercise = given for HEP       Manuals        L knee PROM and patellar mobilizations  22'   16'  16'  16'   L tibiofemoral mobilizations  8'   7'  7'  7'                           There Ex        Rec bike  1/2 revs x 8'  L0 x 8'  L0 x 8'  L0 x 8'  L0 x 8'   Heel slides *        Step gastroc stretch *        Seated knee flex stretch *        Seated HS stretch *        Supine extension hang *                        Neuro Re-Ed        Quad sets *        SAQ        Bridges        Supine SLR *        S/L hip abd        Mini squats *        Lat test the herrera      1R x 15   U/L leg press  55 lbs 2 x 10    55 lbs 2 x 10  55 lbs 2 x 10   Clock drill taps *    X 10 B  X 10 B  HEP   Fwd static lunges    X 15 B  X 15 B  X 15 B                           Re-evaluation   CM      Ther Act/Gait                             Modalities           CP prn.  10'  10'  10'  10'  10'

## 2024-06-18 ENCOUNTER — OFFICE VISIT (OUTPATIENT)
Dept: PHYSICAL THERAPY | Facility: REHABILITATION | Age: 64
End: 2024-06-18
Payer: COMMERCIAL

## 2024-06-18 DIAGNOSIS — Z96.652 S/P TOTAL KNEE ARTHROPLASTY, LEFT: ICD-10-CM

## 2024-06-18 DIAGNOSIS — M25.562 ACUTE PAIN OF LEFT KNEE: Primary | ICD-10-CM

## 2024-06-18 PROCEDURE — 97140 MANUAL THERAPY 1/> REGIONS: CPT | Performed by: PHYSICAL THERAPIST

## 2024-06-18 PROCEDURE — 97110 THERAPEUTIC EXERCISES: CPT | Performed by: PHYSICAL THERAPIST

## 2024-06-18 PROCEDURE — 97112 NEUROMUSCULAR REEDUCATION: CPT | Performed by: PHYSICAL THERAPIST

## 2024-06-18 NOTE — PROGRESS NOTES
Daily Note     Today's date: 2024  Patient name: Wayne Mosley  : 1960  MRN: [id number]  Referring provider: Valerie Bailey DO  Dx:   Encounter Diagnosis     ICD-10-CM    1. Acute pain of left knee  M25.562       2. S/P total knee arthroplasty, left  Z96.652           Start Time: 0815  Stop Time: 0910  Total time in clinic (min): 55 minutes    Subjective: Patient reports he has been walking without AD which has been going well. He hit a bucket of golf balls over the weekend      Objective: See treatment diary below      Assessment: Tolerated treatment well. Initiated band sidestepping and waddle walks with no adverse responses. Continued improvements in knee flexion PROM during manual therapy with tissue stretch end-feel. Patient exhibited good technique with therapeutic exercises and would benefit from continued PT in order to achieve return to prior functional level      Plan: Continue per plan of care.      Diagnosis: s/p L TKA 24   Precautions: none   Primary impairments: L knee ROM deficits, L knee strength deficits, and gait dysfunction   *asterisks by exercise = given for HEP       Manuals        L knee PROM and patellar mobilizations  16'   16'  16'  16'   L tibiofemoral mobilizations  7'   7'  7'  7'                           There Ex        Rec bike  L0 x 8'  L0 x 8'  L0 x 8'  L0 x 8'  L0 x 8'   Heel slides *        Step gastroc stretch *        Seated knee flex stretch *        Seated HS stretch *        Supine extension hang *                        Neuro Re-Ed        Quad sets *        SAQ        Bridges        Supine SLR *        S/L hip abd        Mini squats *        Lat test the herrera  1R x 20     1R x 15   U/L leg press  65 lbs 2 x 10    55 lbs 2 x 10  55 lbs 2 x 10   Clock drill taps *    X 10 B  X 10 B  HEP   Fwd static lunges  X 15 B   X 15 B  X 15 B  X 15 B   Sidestepping  Red x 2 laps       Waddle walks  Red x 2 laps                        Re-evaluation   CM      Ther Act/Gait                             Modalities           CP prn.  10'  10'  10'  10'  10'

## 2024-06-21 ENCOUNTER — OFFICE VISIT (OUTPATIENT)
Dept: PHYSICAL THERAPY | Facility: REHABILITATION | Age: 64
End: 2024-06-21
Payer: COMMERCIAL

## 2024-06-21 DIAGNOSIS — Z96.652 S/P TOTAL KNEE ARTHROPLASTY, LEFT: ICD-10-CM

## 2024-06-21 DIAGNOSIS — M25.562 ACUTE PAIN OF LEFT KNEE: Primary | ICD-10-CM

## 2024-06-21 PROCEDURE — 97140 MANUAL THERAPY 1/> REGIONS: CPT | Performed by: PHYSICAL THERAPIST

## 2024-06-21 PROCEDURE — 97112 NEUROMUSCULAR REEDUCATION: CPT | Performed by: PHYSICAL THERAPIST

## 2024-06-21 PROCEDURE — 97110 THERAPEUTIC EXERCISES: CPT | Performed by: PHYSICAL THERAPIST

## 2024-06-21 NOTE — PROGRESS NOTES
Daily Note     Today's date: 2024  Patient name: Wayne Mosley  : 1960  MRN: [id number]  Referring provider: Valerie Bailey DO  Dx:   Encounter Diagnosis     ICD-10-CM    1. Acute pain of left knee  M25.562       2. S/P total knee arthroplasty, left  Z96.652           Start Time: 0815  Stop Time: 0910  Total time in clinic (min): 55 minutes    Subjective: Patient felt fine after last visit but is stiff on arrival. He has been compliant with HEP      Objective: See treatment diary below      Assessment: Tolerated treatment well. Knee flexion PROM measured at 110 degrees following manual therapy. Full knee extension PROM appreciated passively. Initiated prone knee flexion to approximately 90 degrees.Patient demonstrated fatigue post treatment, exhibited good technique with therapeutic exercises, and would benefit from continued PT      Plan: Continue per plan of care.      Diagnosis: s/p L TKA 24   Precautions: none   Primary impairments: L knee ROM deficits, L knee strength deficits, and gait dysfunction   *asterisks by exercise = given for HEP       Manuals        L knee PROM and patellar mobilizations  16'  16'  16'  16'  16'   L tibiofemoral mobilizations  7'  5'  7'  7'  7'   Prone knee flexion   3'                      There Ex        Rec bike  L0 x 8'  L0 x 8'  L0 x 8'  L0 x 8'  L0 x 8'   Heel slides *        Step gastroc stretch *        Seated knee flex stretch *        Seated HS stretch *        Supine extension hang *                        Neuro Re-Ed        Quad sets *        SAQ        Bridges        Supine SLR *        S/L hip abd        Mini squats *        Lat test the herrera  1R x 20  2R x 20    1R x 15   U/L leg press  65 lbs 2 x 10  65 lbs 2 x 10   55 lbs 2 x 10  55 lbs 2 x 10   Clock drill taps *    X 10 B  X 10 B  HEP   Fwd static lunges  X 15 B  X 15 B  X 15 B  X 15 B  X 15 B   Sidestepping  Red x 2 laps  Red x 2 laps      Waddle walks  Red x 2 laps   Red x 2 laps                      Re-evaluation        Ther Act/Gait                            Modalities           CP prn.  10'  10'  10'  10'  10'

## 2024-06-24 ENCOUNTER — OFFICE VISIT (OUTPATIENT)
Dept: PHYSICAL THERAPY | Facility: REHABILITATION | Age: 64
End: 2024-06-24
Payer: COMMERCIAL

## 2024-06-24 DIAGNOSIS — M25.562 ACUTE PAIN OF LEFT KNEE: Primary | ICD-10-CM

## 2024-06-24 DIAGNOSIS — Z96.652 S/P TOTAL KNEE ARTHROPLASTY, LEFT: ICD-10-CM

## 2024-06-24 PROCEDURE — 97110 THERAPEUTIC EXERCISES: CPT | Performed by: PHYSICAL THERAPIST

## 2024-06-24 PROCEDURE — 97140 MANUAL THERAPY 1/> REGIONS: CPT | Performed by: PHYSICAL THERAPIST

## 2024-06-24 PROCEDURE — 97112 NEUROMUSCULAR REEDUCATION: CPT | Performed by: PHYSICAL THERAPIST

## 2024-06-24 NOTE — PROGRESS NOTES
Daily Note     Today's date: 2024  Patient name: Wayne Mosley  : 1960  MRN: [id number]  Referring provider: Valerie Bailey DO  Dx:   Encounter Diagnosis     ICD-10-CM    1. Acute pain of left knee  M25.562       2. S/P total knee arthroplasty, left  Z96.652           Start Time: 0730  Stop Time: 08  Total time in clinic (min): 50 minutes    Subjective: Patient reports he feels that his progress is slow and he is a bit stiff this morning. He is planning on going [employer] Wednesday morning      Objective: See treatment diary below      Assessment: Tolerated treatment well. Continued quadriceps flexibility limitations with deficits in prone knee flexion compared to seated and supine however improving with treatment. Very good hip and knee strength appreciated during closed chain strengthening. Patient exhibited good technique with therapeutic exercises and would benefit from continued PT      Plan: Continue per plan of care.      Diagnosis: s/p L TKA 24   Precautions: none   Primary impairments: L knee ROM deficits, L knee strength deficits, and gait dysfunction   *asterisks by exercise = given for HEP       Manuals        L knee PROM and patellar mobilizations  16'  16'  10'  16'  16'   L tibiofemoral mobilizations  7'  5'  5'  7'  7'   Prone knee flexion   3'  3'                     There Ex        Rec bike  L0 x 8'  L0 x 8'  L1 x 8'  L0 x 8'  L0 x 8'   Heel slides *        Step gastroc stretch *        Seated knee flex stretch *        Seated HS stretch *        Supine extension hang *                        Neuro Re-Ed        Quad sets *        SAQ        Bridges        Supine SLR *        S/L hip abd        Mini squats *        Lat test the [facility]  1R x 20  2R x 20    1R x 15   U/L leg press  65 lbs 2 x 10  65 lbs 2 x 10   55 lbs 2 x 10  55 lbs 2 x 10   Clock drill taps *     X 10 B  HEP   Fwd static lunges  X 15 B  X 15 B  X 15 B  X 15 B  X 15 B   Sidestepping  Red x  2 laps  Red x 2 laps  Red x 2 laps     Waddle walks  Red x 2 laps  Red x 2 laps  Red x 2 laps     Monster walks    Red x 2 laps                             Re-evaluation        Ther Act/Gait                            Modalities           CP prn.  10'  10'  10'  10'  10'

## 2024-06-25 ENCOUNTER — APPOINTMENT (OUTPATIENT)
Dept: PHYSICAL THERAPY | Facility: REHABILITATION | Age: 64
End: 2024-06-25
Payer: COMMERCIAL

## 2024-06-26 ENCOUNTER — APPOINTMENT (OUTPATIENT)
Dept: PHYSICAL THERAPY | Facility: REHABILITATION | Age: 64
End: 2024-06-26
Payer: COMMERCIAL

## 2024-06-28 ENCOUNTER — OFFICE VISIT (OUTPATIENT)
Dept: PHYSICAL THERAPY | Facility: REHABILITATION | Age: 64
End: 2024-06-28
Payer: COMMERCIAL

## 2024-06-28 DIAGNOSIS — Z96.652 S/P TOTAL KNEE ARTHROPLASTY, LEFT: ICD-10-CM

## 2024-06-28 DIAGNOSIS — M25.562 ACUTE PAIN OF LEFT KNEE: Primary | ICD-10-CM

## 2024-06-28 PROCEDURE — 97140 MANUAL THERAPY 1/> REGIONS: CPT | Performed by: PHYSICAL THERAPIST

## 2024-06-28 PROCEDURE — 97110 THERAPEUTIC EXERCISES: CPT | Performed by: PHYSICAL THERAPIST

## 2024-06-28 PROCEDURE — 97112 NEUROMUSCULAR REEDUCATION: CPT | Performed by: PHYSICAL THERAPIST

## 2024-06-28 NOTE — PROGRESS NOTES
Daily Note     Today's date: 2024  Patient name: Wayne Mosley  : 1960  MRN: [id number]  Referring provider: Valerie Bailey DO  Dx:   Encounter Diagnosis     ICD-10-CM    1. Acute pain of left knee  M25.562       2. S/P total knee arthroplasty, left  Z96.652           Start Time: 0815  Stop Time: 0910  Total time in clinic (min): 55 minutes    Subjective: Patient reports he went golfing this week and made it through 18 holes. He saw the surgeon who is pleased with progress and does not need to follow up again      Objective: See treatment diary below      Assessment: Tolerated treatment well. Patient challenged with static forward lunges with involved knee in trail position. Increased soreness during manual therapy this visit related to golfing earlier this week however patient continues to exhibit full knee extension ROM passively. Patient exhibited good technique with therapeutic exercises and would benefit from continued PT      Plan: Continue per plan of care.      Diagnosis: s/p L TKA 24   Precautions: none   Primary impairments: L knee ROM deficits, L knee strength deficits, and gait dysfunction   *asterisks by exercise = given for HEP       Manuals        L knee PROM and patellar mobilizations  16'  16'  10'  10'  16'   L tibiofemoral mobilizations  7'  5'  5'  5'  7'   Prone knee flexion   3'  3'                     There Ex        Rec bike  L0 x 8'  L0 x 8'  L1 x 8'  L1 x 8'  L0 x 8'   Heel slides *        Step gastroc stretch *        Seated knee flex stretch *        Seated HS stretch *        Supine extension hang *                        Neuro Re-Ed        Quad sets *        SAQ        Bridges        Supine SLR *        S/L hip abd        Mini squats *        Lat test the herrera  1R x 20  2R x 20    1R x 15   U/L leg press  65 lbs 2 x 10  65 lbs 2 x 10    55 lbs 2 x 10   Clock drill taps *      HEP   Fwd static lunges  X 15 B  X 15 B  X 15 B  X 15 B  X 15 B    Sidestepping  Red x 2 laps  Red x 2 laps  Red x 2 laps  Red x 3 laps    Waddle walks  Red x 2 laps  Red x 2 laps  Red x 2 laps  Red x 3 laps    Monster walks    Red x 2 laps  Red x 3 laps                            Re-evaluation        Ther Act/Gait                            Modalities           CP prn.  10'  10'  10'  10'  10'

## 2024-07-02 ENCOUNTER — EVALUATION (OUTPATIENT)
Dept: PHYSICAL THERAPY | Facility: REHABILITATION | Age: 64
End: 2024-07-02
Payer: MEDICARE

## 2024-07-02 DIAGNOSIS — Z96.652 S/P TOTAL KNEE ARTHROPLASTY, LEFT: ICD-10-CM

## 2024-07-02 DIAGNOSIS — M25.562 ACUTE PAIN OF LEFT KNEE: Primary | ICD-10-CM

## 2024-07-02 PROCEDURE — 97110 THERAPEUTIC EXERCISES: CPT | Performed by: PHYSICAL THERAPIST

## 2024-07-02 PROCEDURE — 97112 NEUROMUSCULAR REEDUCATION: CPT | Performed by: PHYSICAL THERAPIST

## 2024-07-02 NOTE — PROGRESS NOTES
PT Re-Evaluation     Today's date: 2024  Patient name: Wayne Mosley  : 1960  MRN: [id number]  Referring provider: Angelito Matos MD  Dx:   Encounter Diagnosis     ICD-10-CM    1. Acute pain of left knee  M25.562       2. S/P total knee arthroplasty, left  Z96.652           Start Time: 0815  Stop Time: 0910  Total time in clinic (min): 55 minutes    Assessment  Impairments: abnormal muscle firing, abnormal or restricted ROM, abnormal movement, activity intolerance, impaired physical strength and pain with function    Assessment details: Patient is a 64 y.o. male presenting s/p L TKA with date of surgery 24. Patient exhibits good progress toward objective and functional goals at time of reexamination. TUG and 5TSTS have improved since prior reassessment indicating improved functional mobility. Patient exhibits improvements with home and community mobility without AD, reciprocal stair navigation, and ability to golf with limitations since initiating PT however continues to have limitations compared to prior level of function. Remaining functional limitations include descending stairs, recreational walking, cycling, and golfing. As a result of impairments patient has continued limitations with daily and functional activities and would benefit from continued skilled PT interventions to address these impairments in order to maximize function.        Prognosis: good    Goals  Impairment Goals: 4-6 weeks  - Patient to decrease pain to 0/10 - PROGRESSING  - Patient to improve knee PROM to equal to uninvolved side - PROGRESSING  - Patient to increase knee strength to 4/5 throughout - MET  - Patient to increase hip strength to 4/5 throughout - MOSTLY MET    Functional Goals: by discharge  - Patient to discharge to independent [facility] - PROGRESSING  - Patient to return to prior level of function - PROGRESSING  - Patient to improve knee AROM to equal to uninvolved side - PROGRESSING  - Patient to ambulate in  home and community without increased pain or difficulty - PROGRESSING  - Patient to ascend and descend stairs without increased pain or difficulty - PROGRESSING  - Patient to complete sit to stand transfers without increased pain or difficulty - MET  - Patient to return to golf - PARTIALLY MET    Plan  Patient would benefit from: skilled physical therapy  Planned modality interventions: cryotherapy, TENS and thermotherapy: hydrocollator packs    Planned therapy interventions: flexibility, home exercise program, joint mobilization, manual therapy, neuromuscular re-education, patient education, strengthening, stretching, therapeutic activities, therapeutic exercise and functional ROM exercises    Frequency: 2x week  Duration in weeks: 4  Treatment plan discussed with: patient        Subjective Evaluation    History of Present Illness  Mechanism of injury: HISTORY OF PRESENT ILLNESS: Patient presents s/p L TKA 24 and was discharged home same day. Patient reports he is getting a little better with interventions. He is walking better and is navigating stairs with reciprocal pattern ascending and descending however must list to the side with descending. He was able to golf 18 holes last week however had increased stiffness following. He continues to have tightness into knee flexion. He is ambulating in home and community without AD.  PRIOR TREATMENT: CSI, PT  AGGRAVATING FACTORS: walking long distances, golfing  EASING FACTORS: ice  WORK: waste water management  (out of work)  FUNCTIONAL LIMITATIONS: descending stairs, recreational walking, cycling, and golfing  IMPROVEMENTS: dressing, driving, sitting tolerance, sit to stand transfers, toilet transfers, home and community mobility without AD, reciprocal stair navigation, and ability to golf with limitations  SUBJECTIVE FUNCTIONAL LEVEL: 80%  PATIENT GOAL: to be able to play golf  Pain  Current pain ratin  At best pain ratin  At worst pain rating:  6  Location: L knee  Quality: dull ache          Objective     Active Range of Motion   Left Knee   Flexion: 110 degrees   Extension: 0 degrees     Right Knee   Flexion: 145 degrees   Extension: -1 degrees     Passive Range of Motion   Left Knee   Flexion: 113 degrees   Extension: 0 degrees     Strength/Myotome Testing     Left Hip   Planes of Motion   Flexion: 5  Extension: 3+  Abduction: 4+    Right Hip   Planes of Motion   Flexion: 5  Extension: 4+  Abduction: 5    Left Knee   Flexion: 3- (4+ within ROM)  Extension: 5  Quadriceps contraction: good    Right Knee   Flexion: 5  Extension: 5  Quadriceps contraction: good    Left Ankle/Foot   Dorsiflexion: 5    Right Ankle/Foot   Dorsiflexion: 5    Additional Strength Details  R SLR: 5  L SLR: 4+    Ambulation     Observational Gait     Additional Observational Gait Details  Decreased knee flexion during swing  Neuro Exam:     Functional outcomes   5x sit to stand: 10 (seconds)  TU.7 (seconds)                 Diagnosis: s/p L TKA 24   Precautions: none   Primary impairments: L knee ROM deficits, L knee strength deficits, and gait dysfunction   *asterisks by exercise = given for HEP     7   Manuals        L knee PROM and patellar mobilizations  16'  16'  10'  10'    L tibiofemoral mobilizations  7'  5'  5'  5'    Prone knee flexion   3'  3'                     There Ex        Rec bike  L0 x 8'  L0 x 8'  L1 x 8'  L1 x 8'  L1 x 8'   Heel slides *        Step gastroc stretch *        Seated knee flex stretch *        Seated HS stretch *        Supine extension hang *                        Neuro Re-Ed        Quad sets *        SAQ        Bridges        Supine SLR *        S/L hip abd        Mini squats *        Lat test the herrera  1R x 20  2R x 20      U/L leg press  65 lbs 2 x 10  65 lbs 2 x 10      Clock drill taps *        Fwd static lunges  X 15 B  X 15 B  X 15 B  X 15 B    Sidestepping  Red x 2 laps  Red x 2 laps  Red x 2 laps  Red x 3  laps    Waddle walks  Red x 2 laps  Red x 2 laps  Red x 2 laps  Red x 3 laps    Monster walks    Red x 2 laps  Red x 3 laps                            Re-evaluation      CM   Ther Act/Gait                            Modalities           CP prn.  10'  10'  10'  10'  10'

## 2024-07-02 NOTE — LETTER
2024    Angelito Matos MD  [address] [place] [id number]    Patient: Wayen Mosley   YOB: 1960   Date of Visit: 2024     Encounter Diagnosis     ICD-10-CM    1. Acute pain of left knee  M25.562       2. S/P total knee arthroplasty, left  Z96.652           Dear Dr. Matos:    Thank you for your recent referral of Wayne Mosley. Please review the attached evaluation summary from Wayne's recent visit.     Please verify that you agree with the plan of care by signing the attached order.     If you have any questions or concerns, please do not hesitate to call.     I sincerely appreciate the opportunity to share in the care of one of your patients and hope to have another opportunity to work with you in the near future.       Sincerely,    Danny Tay, PT      Referring Provider:      I certify that I have read the below Plan of Care and certify the need for these services furnished under this plan of treatment while under my care.                    Angelito Matos MD  [address] [place] [address]  Via Fax: [phone]          PT Re-Evaluation     Today's date: 2024  Patient name: Wayne Mosley  : 1960  MRN: [id number]  Referring provider: Angelito Matos MD  Dx:   Encounter Diagnosis     ICD-10-CM    1. Acute pain of left knee  M25.562       2. S/P total knee arthroplasty, left  Z96.652           Start Time: 0815  Stop Time: 0910  Total time in clinic (min): 55 minutes    Assessment  Impairments: abnormal muscle firing, abnormal or restricted ROM, abnormal movement, activity intolerance, impaired physical strength and pain with function    Assessment details: Patient is a 64 y.o. male presenting s/p L TKA with date of surgery 24. Patient exhibits good progress toward objective and functional goals at time of reexamination. TUG and 5TSTS have improved since prior reassessment indicating improved functional mobility. Patient exhibits  improvements with home and community mobility without AD, reciprocal stair navigation, and ability to golf with limitations since initiating PT however continues to have limitations compared to prior level of function. Remaining functional limitations include descending stairs, recreational walking, cycling, and golfing. As a result of impairments patient has continued limitations with daily and functional activities and would benefit from continued skilled PT interventions to address these impairments in order to maximize function.        Prognosis: good    Goals  Impairment Goals: 4-6 weeks  - Patient to decrease pain to 0/10 - PROGRESSING  - Patient to improve knee PROM to equal to uninvolved side - PROGRESSING  - Patient to increase knee strength to 4/5 throughout - MET  - Patient to increase hip strength to 4/5 throughout - MOSTLY MET    Functional Goals: by discharge  - Patient to discharge to independent HEP - PROGRESSING  - Patient to return to prior level of function - PROGRESSING  - Patient to improve knee AROM to equal to uninvolved side - PROGRESSING  - Patient to ambulate in home and community without increased pain or difficulty - PROGRESSING  - Patient to ascend and descend stairs without increased pain or difficulty - PROGRESSING  - Patient to complete sit to stand transfers without increased pain or difficulty - MET  - Patient to return to golf - PARTIALLY MET    Plan  Patient would benefit from: skilled physical therapy  Planned modality interventions: cryotherapy, TENS and thermotherapy: hydrocollator packs    Planned therapy interventions: flexibility, home exercise program, joint mobilization, manual therapy, neuromuscular re-education, patient education, strengthening, stretching, therapeutic activities, therapeutic exercise and functional ROM exercises    Frequency: 2x week  Duration in weeks: 4  Treatment plan discussed with: patient        Subjective Evaluation    History of Present  Illness  Mechanism of injury: HISTORY OF PRESENT ILLNESS: Patient presents s/p L TKA 24 and was discharged home same day. Patient reports he is getting a little better with interventions. He is walking better and is navigating stairs with reciprocal pattern ascending and descending however must list to the side with descending. He was able to golf 18 holes last week however had increased stiffness following. He continues to have tightness into knee flexion. He is ambulating in home and community without AD.  PRIOR TREATMENT: CSI, PT  AGGRAVATING FACTORS: walking long distances, golfing  EASING FACTORS: ice  WORK: waste water management  (out of work)  FUNCTIONAL LIMITATIONS: descending stairs, recreational walking, cycling, and golfing  IMPROVEMENTS: dressing, driving, sitting tolerance, sit to stand transfers, toilet transfers, home and community mobility without AD, reciprocal stair navigation, and ability to golf with limitations  SUBJECTIVE FUNCTIONAL LEVEL: 80%  PATIENT GOAL: to be able to play golf  Pain  Current pain ratin  At best pain ratin  At worst pain ratin  Location: L knee  Quality: dull ache          Objective     Active Range of Motion   Left Knee   Flexion: 110 degrees   Extension: 0 degrees     Right Knee   Flexion: 145 degrees   Extension: -1 degrees     Passive Range of Motion   Left Knee   Flexion: 113 degrees   Extension: 0 degrees     Strength/Myotome Testing     Left Hip   Planes of Motion   Flexion: 5  Extension: 3+  Abduction: 4+    Right Hip   Planes of Motion   Flexion: 5  Extension: 4+  Abduction: 5    Left Knee   Flexion: 3- (4+ within ROM)  Extension: 5  Quadriceps contraction: good    Right Knee   Flexion: 5  Extension: 5  Quadriceps contraction: good    Left Ankle/Foot   Dorsiflexion: 5    Right Ankle/Foot   Dorsiflexion: 5    Additional Strength Details  R SLR: 5  L SLR: 4+    Ambulation     Observational Gait     Additional Observational Gait  Details  Decreased knee flexion during swing  Neuro Exam:     Functional outcomes   5x sit to stand: 10 (seconds)  TU.7 (seconds)                 Diagnosis: s/p L TKA 24   Precautions: none   Primary impairments: L knee ROM deficits, L knee strength deficits, and gait dysfunction   *asterisks by exercise = given for HEP     7/2   Manuals        L knee PROM and patellar mobilizations  16'  16'  10'  10'    L tibiofemoral mobilizations  7'  5'  5'  5'    Prone knee flexion   3'  3'                     There Ex        Rec bike  L0 x 8'  L0 x 8'  L1 x 8'  L1 x 8'  L1 x 8'   Heel slides *        Step gastroc stretch *        Seated knee flex stretch *        Seated HS stretch *        Supine extension hang *                        Neuro Re-Ed        Quad sets *        SAQ        Bridges        Supine SLR *        S/L hip abd        Mini squats *        Lat test the [employer]  1R x 20  2R x 20      U/L leg press  65 lbs 2 x 10  65 lbs 2 x 10      Clock drill taps *        Fwd static lunges  X 15 B  X 15 B  X 15 B  X 15 B    Sidestepping  Red x 2 laps  Red x 2 laps  Red x 2 laps  Red x 3 laps    Waddle walks  Red x 2 laps  Red x 2 laps  Red x 2 laps  Red x 3 laps    Monster walks    Red x 2 laps  Red x 3 laps                            Re-evaluation      CM   Ther Act/Gait                            Modalities           CP prn.  10'  10'  10'  10'  10'

## 2024-07-05 ENCOUNTER — OFFICE VISIT (OUTPATIENT)
Dept: PHYSICAL THERAPY | Facility: REHABILITATION | Age: 64
End: 2024-07-05
Payer: MEDICARE

## 2024-07-05 DIAGNOSIS — Z96.652 S/P TOTAL KNEE ARTHROPLASTY, LEFT: ICD-10-CM

## 2024-07-05 DIAGNOSIS — M25.562 ACUTE PAIN OF LEFT KNEE: Primary | ICD-10-CM

## 2024-07-05 PROCEDURE — 97112 NEUROMUSCULAR REEDUCATION: CPT | Performed by: PHYSICAL THERAPIST

## 2024-07-05 PROCEDURE — 97110 THERAPEUTIC EXERCISES: CPT | Performed by: PHYSICAL THERAPIST

## 2024-07-05 PROCEDURE — 97140 MANUAL THERAPY 1/> REGIONS: CPT | Performed by: PHYSICAL THERAPIST

## 2024-07-05 NOTE — PROGRESS NOTES
Daily Note     Today's date: 2024  Patient name: Wayne Mosley  : 1960  MRN: [id number]  Referring provider: Angelito Matos MD  Dx:   Encounter Diagnosis     ICD-10-CM    1. Acute pain of left knee  M25.562       2. S/P total knee arthroplasty, left  Z96.652           Start Time: 0815  Stop Time: 0910  Total time in clinic (min): 55 minutes    Subjective: Patient reports he is feeling much looser today. He went golfing and was able to do 18 holes earlier in the week which went well. He notes the golf was significantly better than the first time he went      Objective: See treatment diary below      Assessment: Tolerated treatment well. Quadriceps fatigue following lateral test the herrera however patient able to complete with good eccentric control. Quadriceps flexibility deficits appreciated during prone knee flexion PROM. Continually improving mobility into knee flexion. Patient exhibited good technique with therapeutic exercises and would benefit from continued PT      Plan: Continue per plan of care.      Diagnosis: s/p L TKA 24   Precautions: none   Primary impairments: L knee ROM deficits, L knee strength deficits, and gait dysfunction   *asterisks by exercise = given for HEP     7   Manuals        L knee PROM and patellar mobilizations  10'  16'  10'  10'    L tibiofemoral mobilizations  5'  5'  5'  5'    Prone knee flexion  3'  3'  3'                     There Ex        Rec bike  L1 x 8'  L0 x 8'  L1 x 8'  L1 x 8'  L1 x 8'   Heel slides *        Step gastroc stretch *        Seated knee flex stretch *        Seated HS stretch *        Supine extension hang *                        Neuro Re-Ed        Quad sets *        SAQ        Bridges        Supine SLR *        S/L hip abd        Mini squats *        Lat test the herrera  2R x 20  2R x 20      U/L leg press   65 lbs 2 x 10      Clock drill taps *        Fwd static lunges  X 15 B  X 15 B  X 15 B  X 15 B     Sidestepping  Red x 3 laps  Red x 2 laps  Red x 2 laps  Red x 3 laps    Waddle walks  Red x 3 laps  Red x 2 laps  Red x 2 laps  Red x 3 laps    Monster walks  Red x 3 laps   Red x 2 laps  Red x 3 laps                                    Re-evaluation      CM   Ther Act/Gait                           Modalities           CP prn.  10'  10'  10'  10'  10'

## 2024-07-08 ENCOUNTER — APPOINTMENT (OUTPATIENT)
Dept: PHYSICAL THERAPY | Facility: REHABILITATION | Age: 64
End: 2024-07-08
Payer: MEDICARE

## 2024-07-11 ENCOUNTER — OFFICE VISIT (OUTPATIENT)
Dept: PHYSICAL THERAPY | Facility: REHABILITATION | Age: 64
End: 2024-07-11
Payer: MEDICARE

## 2024-07-11 DIAGNOSIS — Z96.652 S/P TOTAL KNEE ARTHROPLASTY, LEFT: ICD-10-CM

## 2024-07-11 DIAGNOSIS — M25.562 ACUTE PAIN OF LEFT KNEE: Primary | ICD-10-CM

## 2024-07-11 PROCEDURE — 97112 NEUROMUSCULAR REEDUCATION: CPT | Performed by: PHYSICAL THERAPIST

## 2024-07-11 PROCEDURE — 97110 THERAPEUTIC EXERCISES: CPT | Performed by: PHYSICAL THERAPIST

## 2024-07-11 PROCEDURE — 97140 MANUAL THERAPY 1/> REGIONS: CPT | Performed by: PHYSICAL THERAPIST

## 2024-07-11 NOTE — PROGRESS NOTES
Daily Note     Today's date: 2024  Patient name: Wayne Mosley  : 1960  MRN: [id number]  Referring provider: Valerie Bailey DO  Dx:   Encounter Diagnosis     ICD-10-CM    1. Acute pain of left knee  M25.562       2. 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 He notes increased swelling and soreness at the end of the day      Objective: See treatment diary below      Assessment: Tolerated treatment well. 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 Continually improving knee flexion ROM with interventions. Patient demonstrated fatigue post treatment, exhibited good technique with therapeutic exercises, and would benefit from continued PT      Plan: Continue per plan of care.      Diagnosis: s/p L TKA 24   Precautions: none   Primary impairments: L knee ROM deficits, L knee strength deficits, and gait dysfunction   *asterisks by exercise = given for HEP       Manuals        L knee PROM and patellar mobilizations  10'  15'  10'  10'    L tibiofemoral mobilizations  5'  5'  5'  5'    Prone knee flexion  3'  3'  3'                     There Ex        Rec bike  L1 x 8'  L2 x 8'  L1 x 8'  L1 x 8'  L1 x 8'   Heel slides *        Step gastroc stretch *        Seated knee flex stretch *        Seated HS stretch *        Supine extension hang *                        Neuro Re-Ed        Quad sets *        SAQ        Bridges        Supine SLR *        S/L hip abd        Mini squats *        Lat test the herrera  2R x 20       U/L leg press   70 lbs x 20      Clock drill taps *        Fwd static lunges  X 15 B   X 15 B  X 15 B    Sidestepping  Red x 3 laps   Red x 2 laps  Red x 3 laps    Waddle walks  Red x 3 laps    Red x 2 laps  Red x 3 laps    Monster walks  Red x 3 laps   Red x 2 laps  Red x 3 laps                                    Re-evaluation      CM   Ther Act/Gait                           Modalities           CP prn.  10'   10'  10'  10'

## 2024-07-12 ENCOUNTER — APPOINTMENT (OUTPATIENT)
Dept: PHYSICAL THERAPY | Facility: REHABILITATION | Age: 64
End: 2024-07-12
Payer: MEDICARE

## 2024-07-15 ENCOUNTER — APPOINTMENT (OUTPATIENT)
Dept: LAB | Facility: CLINIC | Age: 64
End: 2024-07-15

## 2024-07-15 ENCOUNTER — OCCMED (OUTPATIENT)
Dept: URGENT CARE | Facility: CLINIC | Age: 64
End: 2024-07-15

## 2024-07-15 ENCOUNTER — OFFICE VISIT (OUTPATIENT)
Dept: PHYSICAL THERAPY | Facility: REHABILITATION | Age: 64
End: 2024-07-15
Payer: MEDICARE

## 2024-07-15 DIAGNOSIS — Z96.652 S/P TOTAL KNEE ARTHROPLASTY, LEFT: ICD-10-CM

## 2024-07-15 DIAGNOSIS — Z02.1 PHYSICAL EXAM, PRE-EMPLOYMENT: ICD-10-CM

## 2024-07-15 DIAGNOSIS — Z02.1 PHYSICAL EXAM, PRE-EMPLOYMENT: Primary | ICD-10-CM

## 2024-07-15 DIAGNOSIS — M25.562 ACUTE PAIN OF LEFT KNEE: Primary | ICD-10-CM

## 2024-07-15 LAB
MEV IGG SER QL IA: NORMAL
MUV IGG SER QL IA: NORMAL
RUBV IGG SERPL IA-ACNC: 317.8 IU/ML
VZV IGG SER QL IA: NORMAL

## 2024-07-15 PROCEDURE — 97110 THERAPEUTIC EXERCISES: CPT | Performed by: PHYSICAL THERAPIST

## 2024-07-15 PROCEDURE — 97112 NEUROMUSCULAR REEDUCATION: CPT | Performed by: PHYSICAL THERAPIST

## 2024-07-15 PROCEDURE — 86765 RUBEOLA ANTIBODY: CPT

## 2024-07-15 PROCEDURE — 86735 MUMPS ANTIBODY: CPT

## 2024-07-15 PROCEDURE — 86787 VARICELLA-ZOSTER ANTIBODY: CPT

## 2024-07-15 PROCEDURE — 97140 MANUAL THERAPY 1/> REGIONS: CPT | Performed by: PHYSICAL THERAPIST

## 2024-07-15 PROCEDURE — 86762 RUBELLA ANTIBODY: CPT

## 2024-07-15 PROCEDURE — 86480 TB TEST CELL IMMUN MEASURE: CPT

## 2024-07-15 PROCEDURE — 36415 COLL VENOUS BLD VENIPUNCTURE: CPT

## 2024-07-15 NOTE — PROGRESS NOTES
Daily Note     Today's date: 7/15/2024  Patient name: Wayne Mosley  : 1960  MRN: [id number]  Referring provider: Valerie Bailey DO  Dx:   Encounter Diagnosis     ICD-10-CM    1. Acute pain of left knee  M25.562       2. 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 Good control with unilateral leg press. Despite feeling tight on arrival patient demonstrates functional knee flexion and extension PROM following manual therapy. Patient exhibited good technique with therapeutic exercises and would benefit from continued PT in order to achieve return to prior level of function      Plan: Continue per plan of care.      Diagnosis: s/p L TKA 24   Precautions: none   Primary impairments: L knee ROM deficits, L knee strength deficits, and gait dysfunction   *asterisks by exercise = given for HEP    7/5 7/11 7/15 6/28 7/2   Manuals        L knee PROM and patellar mobilizations  10'  15'  15'  10'    L tibiofemoral mobilizations  5'  5'  5'  5'    Prone knee flexion  3'  3'  3'                     There Ex        Rec bike  L1 x 8'  L2 x 8'  L2 x 8'  L1 x 8'  L1 x 8'   Heel slides *        Step gastroc stretch *        Seated knee flex stretch *        Seated HS stretch *        Supine extension hang *                        Neuro Re-Ed        Quad sets *        SAQ        Bridges        Supine SLR *        S/L hip abd        Mini squats *        Lat test the herrera  2R x 20   2R x 20     U/L leg press   70 lbs x 20  70 lbs x 20     Clock drill taps *        Fwd static lunges  X 15 B    X 15 B    Sidestepping  Red x 3 laps   Red x 3 laps  Red x 3 laps    Waddle walks  Red x 3 laps   Red x 3 laps  Red x 3 laps    Monster walks  Red x 3 laps   Red x  3 laps  Red x 3 laps                                    Re-evaluation      CM   Ther Act/Gait                           Modalities           CP prn.  10'    10'  10'

## 2024-07-16 LAB
GAMMA INTERFERON BACKGROUND BLD IA-ACNC: 0.03 IU/ML
M TB IFN-G BLD-IMP: NEGATIVE
M TB IFN-G CD4+ BCKGRND COR BLD-ACNC: 0.02 IU/ML
M TB IFN-G CD4+ BCKGRND COR BLD-ACNC: 0.02 IU/ML
MITOGEN IGNF BCKGRD COR BLD-ACNC: 2.81 IU/ML

## 2024-07-19 ENCOUNTER — OFFICE VISIT (OUTPATIENT)
Dept: PHYSICAL THERAPY | Facility: REHABILITATION | Age: 64
End: 2024-07-19
Payer: MEDICARE

## 2024-07-19 DIAGNOSIS — Z96.652 S/P TOTAL KNEE ARTHROPLASTY, LEFT: ICD-10-CM

## 2024-07-19 DIAGNOSIS — M25.562 ACUTE PAIN OF LEFT KNEE: Primary | ICD-10-CM

## 2024-07-19 PROCEDURE — 97112 NEUROMUSCULAR REEDUCATION: CPT | Performed by: PHYSICAL THERAPIST

## 2024-07-19 PROCEDURE — 97140 MANUAL THERAPY 1/> REGIONS: CPT | Performed by: PHYSICAL THERAPIST

## 2024-07-19 PROCEDURE — 97110 THERAPEUTIC EXERCISES: CPT | Performed by: PHYSICAL THERAPIST

## 2024-07-19 NOTE — PROGRESS NOTES
Daily Note     Today's date: 2024  Patient name: Wayne Mosley  : 1960  MRN: [id number]  Referring provider: Valerie Bailey DO  Dx:   Encounter Diagnosis     ICD-10-CM    1. Acute pain of left knee  M25.562       2. S/P total knee arthroplasty, left  Z96.652           Start Time: 0815  Stop Time: 0910  Total time in clinic (min): 55 minutes    Subjective: Patient golfed a full 18 holes yesterday but starting having more discomfort around 12 holes. He went home and iced and took Tylenol/Motrin last night and is starting to feel better      Objective: See treatment diary below      Assessment: Tolerated treatment well. Initiated lateral lunges with cueing to complete with hip strategy and proper technique. Hip and knee strength are functional and knee flexion AROM/PROM continue to improve with interventions. Gradually improving prone knee flexion. Patient exhibited good technique with therapeutic exercises and would benefit from continued PT      Plan: Continue per plan of care.      Diagnosis: s/p L TKA 24   Precautions: none   Primary impairments: L knee ROM deficits, L knee strength deficits, and gait dysfunction   *asterisks by exercise = given for HEP    7/5 7/11 7/15 7/19 7   Manuals        L knee PROM and patellar mobilizations  10'  15'  15'  15'    L tibiofemoral mobilizations  5'  5'  5'  5'    Prone knee flexion  3'  3'  3'  3'                    There Ex        Rec bike  L1 x 8'  L2 x 8'  L2 x 8'  L2 x 8'  L1 x 8'   Heel slides *        Step gastroc stretch *        Seated knee flex stretch *        Seated HS stretch *        Supine extension hang *                        Neuro Re-Ed        Quad sets *        SAQ        Bridges        Supine SLR *        S/L hip abd        Mini squats *        Lat test the herrera  2R x 20   2R x 20  2R x 20    U/L leg press   70 lbs x 20  70 lbs x 20  75 lbs x 20    Clock drill taps *        Fwd static lunges  X 15 B    X 15 B    Lat lunges     X 10  B    Sidestepping  Red x 3 laps   Red x 3 laps  Red x 3 laps    Waddle walks  Red x 3 laps   Red x 3 laps  Red x 3 laps    Monster walks  Red x 3 laps   Red x 3 laps  Red x 3 laps                                    Re-evaluation      CM   Ther Act/Gait                           Modalities           CP prn.  10'    10'  10'

## 2024-07-23 ENCOUNTER — OFFICE VISIT (OUTPATIENT)
Dept: PHYSICAL THERAPY | Facility: REHABILITATION | Age: 64
End: 2024-07-23
Payer: MEDICARE

## 2024-07-23 DIAGNOSIS — M25.562 ACUTE PAIN OF LEFT KNEE: Primary | ICD-10-CM

## 2024-07-23 DIAGNOSIS — Z96.652 S/P TOTAL KNEE ARTHROPLASTY, LEFT: ICD-10-CM

## 2024-07-23 PROCEDURE — 97110 THERAPEUTIC EXERCISES: CPT | Performed by: PHYSICAL THERAPIST

## 2024-07-23 PROCEDURE — 97140 MANUAL THERAPY 1/> REGIONS: CPT | Performed by: PHYSICAL THERAPIST

## 2024-07-23 PROCEDURE — 97112 NEUROMUSCULAR REEDUCATION: CPT | Performed by: PHYSICAL THERAPIST

## 2024-07-23 NOTE — PROGRESS NOTES
Daily Note     Today's date: 2024  Patient name: Wayne Mosley  : 1960  MRN: [id number]  Referring provider: Angelito Matos MD  Dx:   Encounter Diagnosis     ICD-10-CM    1. Acute pain of left knee  M25.562       2. 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 He has some difficulty golfing after about 12 holes      Objective: See treatment diary below      Assessment: Tolerated treatment well. Very good exercise recall. Knee flexion ROM continues to gradually improve with interventions. Knee flexion AROM measured at 115 degrees following manual therapy. Patient exhibited good technique with therapeutic exercises and would benefit from continued PT      Plan: Continue per plan of care.      Diagnosis: s/p L TKA 24   Precautions: none   Primary impairments: L knee ROM deficits, L knee strength deficits, and gait dysfunction   *asterisks by exercise = given for HEP    7/5 7/11 7/15 7/19 7/23   Manuals        L knee PROM and patellar mobilizations  10'  15'  15'  15'  15'   L tibiofemoral mobilizations  5'  5'  5'  5'  5'   Prone knee flexion  3'  3'  3'  3'  3'                   There Ex        Rec bike  L1 x 8'  L2 x 8'  L2 x 8'  L2 x 8'  L2 x 8' upright   Heel slides *        Step gastroc stretch *        Seated knee flex stretch *        Seated HS stretch *        Supine extension hang *                        Neuro Re-Ed        Quad sets *        SAQ        Bridges        Supine SLR *        S/L hip abd        Mini squats *        Lat test the herrera  2R x 20   2R x 20  2R x 20  2R x 20   U/L leg press   70 lbs x 20  70 lbs x 20  75 lbs x 20  75 lbs x 20    Clock drill taps *        Fwd static lunges  X 15 B    X 15 B  X 15 B   Lat lunges     X 10 B    Sidestepping  Red x 3 laps   Red x 3 laps  Red x  3 laps  Red x 3 laps   Waddle walks  Red x 3 laps   Red x 3 laps  Red x 3 laps  Red x 3 laps   Monster walks  Red x 3 laps   Red x 3 laps  Red x 3 laps  Red x 3 laps                                   Re-evaluation        Ther Act/Gait                          Modalities          CP prn.  10'    10'

## 2024-07-26 ENCOUNTER — OFFICE VISIT (OUTPATIENT)
Dept: PHYSICAL THERAPY | Facility: REHABILITATION | Age: 64
End: 2024-07-26
Payer: MEDICARE

## 2024-07-26 DIAGNOSIS — M25.562 ACUTE PAIN OF LEFT KNEE: Primary | ICD-10-CM

## 2024-07-26 DIAGNOSIS — Z96.652 S/P TOTAL KNEE ARTHROPLASTY, LEFT: ICD-10-CM

## 2024-07-26 PROCEDURE — 97112 NEUROMUSCULAR REEDUCATION: CPT | Performed by: PHYSICAL THERAPIST

## 2024-07-26 PROCEDURE — 97110 THERAPEUTIC EXERCISES: CPT | Performed by: PHYSICAL THERAPIST

## 2024-07-26 PROCEDURE — 97140 MANUAL THERAPY 1/> REGIONS: CPT | Performed by: PHYSICAL THERAPIST

## 2024-07-26 NOTE — PROGRESS NOTES
Daily Note     Today's date: 2024  Patient name: Wyane Mosley  : 1960  MRN: [id number]  Referring provider: Angelito Matos MD  Dx:   Encounter Diagnosis     ICD-10-CM    1. Acute pain of left knee  M25.562       2. 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 He also had to climb on top of objects      Objective: See treatment diary below      Assessment: Tolerated treatment well. Deferred progressions due to soreness on arrival. Patient more challenged with lunges with involved knee in trail position due to quadriceps flexibility deficits. Very good knee flexion PROM appreciated during manual therapy. Patient exhibited good technique with therapeutic exercises and would benefit from continued PT      Plan: Continue per plan of care.      Diagnosis: s/p L TKA 24   Precautions: none   Primary impairments: L knee ROM deficits, L knee strength deficits, and gait dysfunction   *asterisks by exercise = given for HEP    7/26 7/11 7/15 7/19 7/23   Manuals        L knee PROM and patellar mobilizations  15'  15'  15'  15'  15'   L tibiofemoral mobilizations  8'  5'  5'  5'  5'   Prone knee flexion   3'  3'  3'  3'                   There Ex        Rec bike  L2 x 8' upright  L2 x 8'  L2 x 8'  L2 x 8'  L2 x 8' upright   Heel slides *        Step gastroc stretch *        Seated knee flex stretch *        Seated HS stretch *        Supine extension hang *                        Neuro Re-Ed        Quad sets *        SAQ        Bridges        Supine SLR *        S/L hip abd        Mini squats *        Lat test the herrera  2R x 20   2R x 20  2R x 20  2R x 20   U/L leg press  80 lbs x 20  70 lbs x 20  70 lbs x 20  75 lbs x 20  75 lbs x 20    Clock drill taps *        Fwd static lunges  X 15 B    X 15 B  X 15 B   Lat lunges      X 10 B    Sidestepping  Red x 3 laps   Red x 3 laps  Red x 3 laps  Red x 3 laps   Waddle walks  Red x 3 laps   Red x 3 laps  Red x 3 laps  Red x 3 laps   Monster walks  Red x 3 laps   Red x 3 laps  Red x 3 laps  Red x 3 laps                                   Re-evaluation        Ther Act/Gait                          Modalities         CP prn.     10'

## 2024-07-30 ENCOUNTER — OFFICE VISIT (OUTPATIENT)
Dept: PHYSICAL THERAPY | Facility: REHABILITATION | Age: 64
End: 2024-07-30
Payer: MEDICARE

## 2024-07-30 DIAGNOSIS — Z96.652 S/P TOTAL KNEE ARTHROPLASTY, LEFT: ICD-10-CM

## 2024-07-30 DIAGNOSIS — M25.562 ACUTE PAIN OF LEFT KNEE: Primary | ICD-10-CM

## 2024-07-30 PROCEDURE — 97140 MANUAL THERAPY 1/> REGIONS: CPT | Performed by: PHYSICAL THERAPIST

## 2024-07-30 PROCEDURE — 97110 THERAPEUTIC EXERCISES: CPT | Performed by: PHYSICAL THERAPIST

## 2024-07-30 PROCEDURE — 97112 NEUROMUSCULAR REEDUCATION: CPT | Performed by: PHYSICAL THERAPIST

## 2024-07-30 NOTE — PROGRESS NOTES
Daily Note     Today's date: 2024  Patient name: Wayne Mosley  : 1960  MRN: [id number]  Referring provider: Angelito Matos MD  Dx:   Encounter Diagnosis     ICD-10-CM    1. Acute pain of left knee  M25.562       2. S/P total knee arthroplasty, left  Z96.652           Start Time: 0815  Stop Time: 0910  Total time in clinic (min): 55 minutes    Subjective: Patient reports he started work as a  but is just doing orientation so far. 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 He denies any difficulty weightbearing or residual symptoms      Objective: See treatment diary below      Assessment: Tolerated treatment well. Full knee flexion and extension PROM appreciated following manual therapy. Patient exhibited good technique with therapeutic exercises. Plan to reassess next visit and consider discharge to independent [facility] pending functional progress      Plan: Continue per plan of care.      Diagnosis: s/p L TKA 24   Precautions: none   Primary impairments: L knee ROM deficits, L knee strength deficits, and gait dysfunction   *asterisks by exercise = given for HEP    7/26 7/30 7/15 7/19 7/23   Manuals        L knee PROM and patellar mobilizations  15'  15'  15'  15'  15'   L tibiofemoral mobilizations  8'  8'  5'  5'  5'   Prone knee flexion    3'  3'  3'                   There Ex        Rec bike  L2 x 8' upright  L2 x 8' upright  L2 x 8'  L2 x 8'  L2 x 8' upright   Heel slides *        Step gastroc stretch *        Seated knee flex stretch *        Seated HS stretch *        Supine extension hang *                        Neuro Re-Ed        Quad sets *        SAQ        Bridges        Supine SLR *        S/L hip abd        Mini squats *        Lat test the herrera  2R x 20  2R x 20  2R x 20  2R x 20  2R x 20   U/L leg press  80 lbs x 20  90 lbs x 20  70 lbs x 20  75 lbs x 20  75 lbs x 20    Clock drill taps *         Fwd static lunges  X 15 B  X 15 B   X 15 B  X 15 B   Lat lunges     X 10 B    Sidestepping  Red x 3 laps  Red x 3 laps  Red x 3 laps  Red x 3 laps  Red x 3 laps   Waddle walks  Red x 3 laps  Red x 3 laps  Red x 3 laps  Red x 3 laps  Red x 3 laps   Monster walks  Red x 3 laps  Red x 3 laps  Red x 3 laps  Red x 3 laps  Red x 3 laps                                   Re-evaluation        Ther Act/Gait                          Modalities        CP prn.     10'

## 2024-08-02 ENCOUNTER — EVALUATION (OUTPATIENT)
Dept: PHYSICAL THERAPY | Facility: REHABILITATION | Age: 64
End: 2024-08-02
Payer: MEDICARE

## 2024-08-02 DIAGNOSIS — Z96.652 S/P TOTAL KNEE ARTHROPLASTY, LEFT: ICD-10-CM

## 2024-08-02 DIAGNOSIS — M25.562 ACUTE PAIN OF LEFT KNEE: Primary | ICD-10-CM

## 2024-08-02 PROCEDURE — 97112 NEUROMUSCULAR REEDUCATION: CPT | Performed by: PHYSICAL THERAPIST

## 2024-08-02 PROCEDURE — 97110 THERAPEUTIC EXERCISES: CPT | Performed by: PHYSICAL THERAPIST

## 2024-08-02 NOTE — PROGRESS NOTES
PT Re-Evaluation  and PT Discharge    Today's date: 2024  Patient name: Wayne Mosley  : 1960  MRN: [id number]  Referring provider: Angelito Matos MD  Dx:   Encounter Diagnosis     ICD-10-CM    1. Acute pain of left knee  M25.562       2. S/P total knee arthroplasty, left  Z96.652           Start Time: 0815  Stop Time: 0900  Total time in clinic (min): 45 minutes    Assessment    Assessment details: Patient is a 64 y.o. male presenting s/p L TKA with date of surgery 24. Patient has been compliant with physical therapy and has achieved functional goals at this time. 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 Patient is discharged to independent [facility]. Reviewed HEP, educated patient regarding discharge plan, and answered all patient questions to satisfaction. Good prognosis.         Prognosis: good    Goals  Impairment Goals: 4-6 weeks  - Patient to decrease pain to 0/10 - MET  - Patient to improve knee PROM to equal to uninvolved side - MOSTLY MET  - Patient to increase knee strength to 4/5 throughout - MET  - Patient to increase hip strength to 4/5 throughout - MET    Functional Goals: by discharge  - Patient to discharge to independent [facility] - MET  - Patient to return to prior level of function - MOSTLY MET  - Patient to improve knee AROM to equal to uninvolved side - MOSTLY MET  - Patient to ambulate in home and community without increased pain or difficulty - MET  - Patient to ascend and descend stairs without increased pain or difficulty - MET  - Patient to complete sit to stand transfers without increased pain or difficulty - MET  - Patient to return to golf - MET    Plan    Treatment plan discussed with: patient  Plan details: Discharge to independent [facility]        Subjective  Evaluation    History of Present Illness  Mechanism of injury: HISTORY OF PRESENT ILLNESS: Patient presents s/p L TKA 24 and was discharged home same day. Patient reports he has improved with interventions and he is able to navigate stairs with reciprocal pattern with no limitations. He has been golfing 4 times with manageable soreness/stiffness following. He feels ready to transition to independent HEP.   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  SUBJECTIVE FUNCTIONAL LEVEL: 80%  PATIENT GOAL: to be able to play golf - MET  Pain  Current pain ratin  At best pain ratin  At worst pain ratin  Location: L knee  Quality: dull ache          Objective     Active Range of Motion   Left Knee   Flexion: 118 degrees   Extension: 1 degrees     Right Knee   Flexion: 145 degrees   Extension: -1 degrees     Passive Range of Motion   Left Knee   Flexion: WFL  Extension: 1 degrees     Strength/Myotome Testing     Left Hip   Planes of Motion   Flexion: 5  Extension: 4+  Abduction: 4+    Right Hip   Planes of Motion   Flexion: 5  Extension: 4+  Abduction: 5    Left Knee   Flexion: 5  Extension: 5  Quadriceps contraction: good    Right Knee   Flexion: 5  Extension: 5  Quadriceps contraction: good    Left Ankle/Foot   Dorsiflexion: 5    Right Ankle/Foot   Dorsiflexion: 5    Additional Strength Details  R SLR: 5  L SLR: 5    Ambulation     Observational Gait     Additional Observational Gait Details  No deviations  Neuro Exam:     Functional outcomes   5x sit to stand: 8.9 (seconds)                 Diagnosis: s/p L TKA 24   Precautions: none   Primary  impairments: L knee ROM deficits, L knee strength deficits, and gait dysfunction   *asterisks by exercise = given for HEP    7/26 7/30 8/2 7/19 7/23   Manuals        L knee PROM and patellar mobilizations  15'  15'   15'  15'   L tibiofemoral mobilizations  8'  8'   5'  5'   Prone knee flexion     3'  3'                   There Ex        Rec bike  L2 x 8' upright  L2 x 8' upright  L2 x 8' upright  L2 x 8'  L2 x 8' upright   Heel slides *        Step gastroc stretch *        Seated knee flex stretch *        Seated HS stretch *        Supine extension hang *                        Neuro Re-Ed        Quad sets *        SAQ        Bridges        Supine SLR *        S/L hip abd        Mini squats *        Lat test the [employer]  2R x 20  2R x 20   2R x 20  2R x 20   U/L leg press  80 lbs x 20  90 lbs x 20   75 lbs x 20  75 lbs x 20    Clock drill taps *        Fwd static lunges  X 15 B  X 15 B   X 15 B  X 15 B   Lat lunges     X 10 B    Sidestepping  Red x 3 laps  Red x 3 laps   Red x 3 laps  Red x 3 laps   Waddle walks  Red x 3 laps  Red x 3 laps   Red x 3 laps  Red x 3 laps   Monster walks  Red x 3 laps  Red x 3 laps   Red x 3 laps  Red x 3 laps                                   Re-evaluation    CM     Ther Act/Gait                          Modalities        CP prn.    10'  10'

## 2024-08-02 NOTE — LETTER
2024    Angelito Matos MD  [address] [place] [id number]    Patient: Wayne Mosley   YOB: 1960   Date of Visit: 2024     Encounter Diagnosis     ICD-10-CM    1. Acute pain of left knee  M25.562       2. S/P total knee arthroplasty, left  Z96.652           Dear Dr. Matos:    Thank you for your recent referral of Wayne Mosley. Please review the attached evaluation summary from Wayne's recent visit.     Please verify that you agree with the plan of care by signing the attached order.     If you have any questions or concerns, please do not hesitate to call.     I sincerely appreciate the opportunity to share in the care of one of your patients and hope to have another opportunity to work with you in the near future.       Sincerely,    Danny Tay, PT      Referring Provider:      I certify that I have read the below Plan of Care and certify the need for these services furnished under this plan of treatment while under my care.                    Angelito Matos MD  [address] [place] [address]  Via Fax: [phone]          PT Re-Evaluation  and PT Discharge    Today's date: 2024  Patient name: Wayne Mosley  : 1960  MRN: [id number]  Referring provider: Angelito Matos MD  Dx:   Encounter Diagnosis     ICD-10-CM    1. Acute pain of left knee  M25.562       2. S/P total knee arthroplasty, left  Z96.652           Start Time: 0815  Stop Time: 0900  Total time in clinic (min): 45 minutes    Assessment    Assessment details: Patient is a 64 y.o. male presenting s/p L TKA with date of surgery 24. Patient has been compliant with physical therapy and has achieved functional goals at this time. 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  Patient is discharged to independent [facility]. Reviewed HEP, educated patient regarding discharge plan, and answered all patient questions to satisfaction. Good prognosis.         Prognosis: good    Goals  Impairment Goals: 4-6 weeks  - Patient to decrease pain to 0/10 - MET  - Patient to improve knee PROM to equal to uninvolved side - MOSTLY MET  - Patient to increase knee strength to 4/5 throughout - MET  - Patient to increase hip strength to 4/5 throughout - MET    Functional Goals: by discharge  - Patient to discharge to independent [facility] - MET  - Patient to return to prior level of function - MOSTLY MET  - Patient to improve knee AROM to equal to uninvolved side - MOSTLY MET  - Patient to ambulate in home and community without increased pain or difficulty - MET  - Patient to ascend and descend stairs without increased pain or difficulty - MET  - Patient to complete sit to stand transfers without increased pain or difficulty - MET  - Patient to return to golf - MET    Plan    Treatment plan discussed with: patient  Plan details: Discharge to independent [facility]        Subjective Evaluation    History of Present Illness  Mechanism of injury: HISTORY OF PRESENT ILLNESS: Patient presents s/p L TKA 4/5/24 and was discharged home same day. Patient reports he has improved with interventions and he is able to navigate stairs with reciprocal pattern with no limitations. He has been golfing 4 times with manageable soreness/stiffness following. He feels ready to transition to independent [facility].   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  SUBJECTIVE FUNCTIONAL LEVEL:  80%  PATIENT GOAL: to be able to play golf - MET  Pain  Current pain ratin  At best pain ratin  At worst pain ratin  Location: L knee  Quality: dull ache          Objective     Active Range of Motion   Left Knee   Flexion: 118 degrees   Extension: 1 degrees     Right Knee   Flexion: 145 degrees   Extension: -1 degrees     Passive Range of Motion   Left Knee   Flexion: WFL  Extension: 1 degrees     Strength/Myotome Testing     Left Hip   Planes of Motion   Flexion: 5  Extension: 4+  Abduction: 4+    Right Hip   Planes of Motion   Flexion: 5  Extension: 4+  Abduction: 5    Left Knee   Flexion: 5  Extension: 5  Quadriceps contraction: good    Right Knee   Flexion: 5  Extension: 5  Quadriceps contraction: good    Left Ankle/Foot   Dorsiflexion: 5    Right Ankle/Foot   Dorsiflexion: 5    Additional Strength Details  R SLR: 5  L SLR: 5    Ambulation     Observational Gait     Additional Observational Gait Details  No deviations  Neuro Exam:     Functional outcomes   5x sit to stand: 8.9 (seconds)                 Diagnosis: s/p L TKA 24   Precautions: none   Primary impairments: L knee ROM deficits, L knee strength deficits, and gait dysfunction   *asterisks by exercise = given for HEP       Manuals        L knee PROM and patellar mobilizations  15'  15'   15'  15'   L tibiofemoral mobilizations  8'  8'   5'  5'   Prone knee flexion     3'  3'                   There Ex        Rec bike  L2 x 8' upright  L2 x 8' upright  L2 x 8' upright  L2 x 8'  L2 x 8' upright   Heel slides *        Step gastroc stretch *        Seated knee flex stretch *        Seated HS stretch *        Supine extension hang *                        Neuro Re-Ed        Quad sets *        SAQ        Bridges        Supine SLR *        S/L hip abd        Mini squats *        Lat test the [employer]  2R x 20  2R x 20   2R x 20  2R x 20   U/L leg press  80 lbs x 20  90 lbs x 20   75 lbs x 20  75 lbs x 20    Clock drill taps  *        Fwd static lunges  X 15 B  X 15 B   X 15 B  X 15 B   Lat lunges     X 10 B    Sidestepping  Red x 3 laps  Red x 3 laps   Red x 3 laps  Red x 3 laps   Waddle walks  Red x 3 laps  Red x 3 laps   Red x 3 laps  Red x 3 laps   Monster walks  Red x 3 laps  Red x 3 laps   Red x 3 laps  Red x 3 laps                                   Re-evaluation    CM     Ther Act/Gait                          Modalities        CP prn.    10'  10'

## 2024-08-06 ENCOUNTER — HOSPITAL ENCOUNTER (OUTPATIENT)
Dept: NUCLEAR MEDICINE | Facility: HOSPITAL | Age: 64
Discharge: HOME | End: 2024-08-06
Payer: MEDICARE

## 2024-08-06 ENCOUNTER — HOSPITAL ENCOUNTER (OUTPATIENT)
Dept: NON INVASIVE DIAGNOSTICS | Facility: HOSPITAL | Age: 64
Discharge: HOME | End: 2024-08-06
Payer: MEDICARE

## 2024-08-06 VITALS — HEIGHT: 69 IN | BODY MASS INDEX: 22.07 KG/M2 | WEIGHT: 149 LBS

## 2024-08-06 VITALS
BODY MASS INDEX: 22.07 KG/M2 | SYSTOLIC BLOOD PRESSURE: 134 MMHG | WEIGHT: 149 LBS | HEART RATE: 63 BPM | HEIGHT: 69 IN | DIASTOLIC BLOOD PRESSURE: 67 MMHG

## 2024-08-06 DIAGNOSIS — R93.1 ELEVATED CORONARY ARTERY CALCIUM SCORE: ICD-10-CM

## 2024-08-06 LAB
AORTIC ROOT: 3 CM
APICAL FOUR CHAMBER EJECTION FRACTION: 60 %
ASCENDING AORTA: 3.3 CM
CHEST PAIN STATEMENT: NORMAL
E WAVE DECELERATION TIME: 145 MS
E/A RATIO: 0.7
FRACTIONAL SHORTENING: 33 (ref 28–44)
INTERVENTRICULAR SEPTUM IN DIASTOLE (PARASTERNAL SHORT AXIS VIEW): 1 CM
INTERVENTRICULAR SEPTUM: 1 CM (ref 0.6–1.1)
LAAS-AP2: 17.2 CM2
LAAS-AP4: 18 CM2
LEFT ATRIUM SIZE: 4.1 CM
LEFT ATRIUM VOLUME (MOD BIPLANE): 49 ML
LEFT ATRIUM VOLUME INDEX (MOD BIPLANE): 26.9 ML/M2
LEFT INTERNAL DIMENSION IN SYSTOLE: 3.4 CM (ref 2.1–4)
LEFT VENTRICULAR INTERNAL DIMENSION IN DIASTOLE: 5.1 CM (ref 3.5–6)
LEFT VENTRICULAR POSTERIOR WALL IN END DIASTOLE: 0.9 CM
LEFT VENTRICULAR STROKE VOLUME: 80 ML
LVSV (TEICH): 80 ML
MAX DIASTOLIC BP: 62 MMHG
MAX HR PERCENT: 90 %
MAX HR: 139 BPM
MAX PREDICTED HEART RATE: 154 BPM
MV E'TISSUE VEL-SEP: 9 CM/S
MV PEAK A VEL: 0.53 M/S
MV PEAK E VEL: 37 CM/S
MV STENOSIS PRESSURE HALF TIME: 42 MS
MV VALVE AREA P 1/2 METHOD: 5.24
NUC STRESS EJECTION FRACTION: 62 %
PROTOCOL NAME: NORMAL
RA PRESSURE ESTIMATED: 5 MMHG
RATE PRESSURE PRODUCT: NORMAL
REASON FOR TERMINATION: NORMAL
RIGHT ATRIUM AREA SYSTOLE A4C: 21.2 CM2
RIGHT VENTRICLE ID DIMENSION: 4 CM
RV PSP: 27 MMHG
SL CV LEFT ATRIUM LENGTH A2C: 5.1 CM
SL CV LV EF: 60
SL CV PED ECHO LEFT VENTRICLE DIASTOLIC VOLUME (MOD BIPLANE) 2D: 126 ML
SL CV PED ECHO LEFT VENTRICLE SYSTOLIC VOLUME (MOD BIPLANE) 2D: 46 ML
SL CV REST NUCLEAR ISOTOPE DOSE: 10.9 MCI
SL CV STRESS NUCLEAR ISOTOPE DOSE: 32.5 MCI
SL CV STRESS RECOVERY BP: NORMAL MMHG
SL CV STRESS RECOVERY HR: 86 BPM
SL CV STRESS RECOVERY O2 SAT: 98 %
SL CV STRESS STAGE REACHED: 3
STRESS ANGINA INDEX: 0
STRESS BASELINE BP: NORMAL MMHG
STRESS BASELINE HR: 61 BPM
STRESS O2 SAT REST: 99 %
STRESS PEAK HR: 139 BPM
STRESS POST ESTIMATED WORKLOAD: 10.1 METS
STRESS POST EXERCISE DUR MIN: 9 MIN
STRESS POST EXERCISE DUR MIN: 9 MIN
STRESS POST EXERCISE DUR SEC: 0 SEC
STRESS POST O2 SAT PEAK: 99 %
STRESS POST PEAK BP: 176 MMHG
STRESS POST PEAK HR: 139 BPM
STRESS POST PEAK SYSTOLIC BP: 176 MMHG
STRESS/REST PERFUSION RATIO: 0.86
TARGET HR FORMULA: NORMAL
TEST INDICATION: NORMAL
TR MAX PG: 22 MMHG
TR PEAK VELOCITY: 2.4 M/S
TRICUSPID ANNULAR PLANE SYSTOLIC EXCURSION: 2.2 CM
TRICUSPID VALVE PEAK REGURGITATION VELOCITY: 2.36 M/S

## 2024-08-06 PROCEDURE — A9502 TC99M TETROFOSMIN: HCPCS

## 2024-08-06 PROCEDURE — 78452 HT MUSCLE IMAGE SPECT MULT: CPT

## 2024-08-06 PROCEDURE — 93016 CV STRESS TEST SUPVJ ONLY: CPT | Performed by: INTERNAL MEDICINE

## 2024-08-06 PROCEDURE — 93018 CV STRESS TEST I&R ONLY: CPT | Performed by: INTERNAL MEDICINE

## 2024-08-06 PROCEDURE — 93306 TTE W/DOPPLER COMPLETE: CPT | Performed by: STUDENT IN AN ORGANIZED HEALTH CARE EDUCATION/TRAINING PROGRAM

## 2024-08-06 PROCEDURE — 93306 TTE W/DOPPLER COMPLETE: CPT

## 2024-08-06 PROCEDURE — 78452 HT MUSCLE IMAGE SPECT MULT: CPT | Performed by: INTERNAL MEDICINE

## 2024-08-06 PROCEDURE — 93017 CV STRESS TEST TRACING ONLY: CPT

## 2024-08-07 DIAGNOSIS — R93.1 ELEVATED CORONARY ARTERY CALCIUM SCORE: Primary | ICD-10-CM

## 2024-08-07 DIAGNOSIS — E78.2 MIXED HYPERLIPIDEMIA: ICD-10-CM

## 2024-08-20 ENCOUNTER — CLINICAL SUPPORT (OUTPATIENT)
Dept: NUTRITION | Facility: HOSPITAL | Age: 64
End: 2024-08-20
Payer: MEDICARE

## 2024-08-20 VITALS — BODY MASS INDEX: 21.7 KG/M2 | WEIGHT: 151.6 LBS | HEIGHT: 70 IN

## 2024-08-20 DIAGNOSIS — E78.2 MIXED HYPERLIPIDEMIA: ICD-10-CM

## 2024-08-20 DIAGNOSIS — R93.1 ELEVATED CORONARY ARTERY CALCIUM SCORE: ICD-10-CM

## 2024-08-20 NOTE — PROGRESS NOTES
" Nutrition Assessment Form    Patient Name: Wayne Mosley    YOB: 1960    Sex: Male     Assessment Date: 8/20/2024  Start Time: 1:30pm Stop Time: 2:30pm Total Minutes: 60 mins     Data:  Present at session: self   Parent/Patient Concerns/reason for visit: Concerned with weight loss, but worried with the mixed HLD, lost weight d/t eating better   Medical Dx/Reason for Referral: R93.1 (ICD-10-CM) - Elevated coronary artery calcium [id number] (ICD-10-CM) - Mixed hyperlipidemia   Past Medical History:   Diagnosis Date    Abnormal blood chemistry     Allergic rhinitis     last assessed 08/22/2014    Anxiety     Elevated PSA     Erectile dysfunction of non-organic origin     Hyperglycemia     Last Assessed: 1/20/2016     Hyperlipidemia     Kidney stone     Paresthesia of foot     last assessed 08/14/2015       Current Outpatient Medications   Medication Sig Dispense Refill    ascorbic acid (VITAMIN C) 500 mg tablet Take 1,000 mg by mouth daily      DAILY MULTIPLE VITAMINS/IRON PO Take 1 tablet by mouth daily      rosuvastatin (CRESTOR) 20 MG tablet Take 1 tablet (20 mg total) by mouth daily 90 tablet 1    tadalafil (CIALIS) 5 MG tablet 1 tab daily 90 tablet 0     No current facility-administered medications for this visit.        Additional Meds/Supplements:    Special Learning Needs/barriers to learning/any new barriers    Height: Ht Readings from Last 5 Encounters:   08/20/24 5' 10\" (1.778 m)   08/06/24 5' 9\" (1.753 m)   08/06/24 5' 9\" (1.753 m)   05/16/24 5' 9\" (1.753 m)   04/02/24 5' 9\" (1.753 m)      Weight: Wt Readings from Last 10 Encounters:   08/20/24 68.8 kg (151 lb 9.6 oz)   08/06/24 67.6 kg (149 lb)   08/06/24 67.6 kg (149 lb)   05/16/24 67.6 kg (149 lb)   04/02/24 70.8 kg (156 lb)   03/18/24 70.8 kg (156 lb)   01/31/24 71.7 kg (158 lb)   01/24/24 71.7 kg (158 lb)   01/03/24 69.9 kg (154 lb)   09/18/23 71.5 kg (157 lb 9.6 oz)     Estimated body mass index is 21.75 kg/m² as calculated from " "the following:    Height as of this encounter: 5' 10\" (1.778 m).    Weight as of this encounter: 68.8 kg (151 lb 9.6 oz).   Recent Weight Change: [x]Yes     []No  Amount: 7lbs/4.4% wt.  Loss x 6 months, feels he lost weight from eating healthier      Energy Needs: 1720-2064cal 25-30cal/kg, 82.5g 1.2g/kg, 1720-2064mL 1mL/laxmi   Allergies   Allergen Reactions    Other      trees    Pollen Extract     or intolerances    Social History     Substance and Sexual Activity   Alcohol Use Yes    Comment: occasional/social alcohol use     _______x/wk or month  1 or 2 or 3 or 4 or____ drinks/session   Mixed drinks/ wine/ beer  Casual may have a beer or drink if playing golf or eating out   Social History     Tobacco Use   Smoking Status Never   Smokeless Tobacco Never       Who shops? spouse   Who cooks/cooking methods/Eating out/take out habits   patient  Cooking methods: bake/quiñones/air quiñones/grill/boil/other________    Take out: __2_ x/wk   Dining out ___1_ x/2 wk    Exercise: Walk/ run/ bike/ gym/elliptical/other _________  ____ x/wk how many minutes:______   strength training    Plays golf,  at [facility] does a lot of walking   Other: ie: Sleep habits/ stress level/ work habits household-lives with ?/ food security Gets up frequent to use the bathroom 2-3x day a night but falls back to sleep gets about 7-8hrs of sleep at night   Prior Nutritional Counseling? []Yes     [x]No  When:      Why:         Diet Hx:  Breakfast: Skips or has Coffee daily and twice a week gets glazed donut  Two days might have oatmeal, 2 eggs, toast, sausage links  At work sometimes gets egg sandwich    Lunch: Some times skips lunch or may have Turkey/stuffing, mixed veggies, burger with lettuce/tomato/onion and mixed veggies     water     Dinner:   [place], crab cakes, mashed potatoes, maxx slaw or spaghetti with sausage  Water, ice tea, cranberry juice     Snacks: AM -   PM - pb crackers or trail mix  HS - ice cream, fruit snacks   Other Notes/ " Initial Assessment:  Wayne presents for nutrition counseling. Wayne is worried about weight loss but wants to eat healthy but feels he loses when he cuts down on things and eats healthy.  Discussed tips for maximizing nutrition with balancing meals to help meet pro, laxmi needs and preventing weight loss.     Updated assessment (Follow up note only):           Nutrition Diagnosis:   Inadequate energy intake  related to Food and nutrition related knowledge deficit concerning energy intake as evidenced by  Estimated energy intake from diet less than needs based on estimated or measured resting metabolic rate       Any change or new dx since previous visit:     Nutrition Diagnosis:         Medical Nutrition Therapy Intervention:  [x]Individualized Meal Plan- Recommend Heart healthy diet, consuming 3 meals and snacks in between meals, avoid skipping meals.  Reviewed MyPlate method for balancing meals.  Discussed saturated fat versus healthier fat options, encouraged high fiber intake. Reviewed High Protein, High Calorie Nutrition Therapy Diet education.  Provided ideas to maximize calorie intake at meals and snacks using calorie dense foods such as:   -Drink milk, oral nutrition supplements (provided coupons for Ensure), smoothies instead of low-calorie beverages such as diet drinks or water.  -Add jelly, jam, honey, whipped butter or plant based margarine to bread and crackers.   -Mix dried fruit, nuts, granola, honey, or dry cereal with yogurt or hot cereals.  -Blend a fruit smoothie of a banana, frozen berries, milk or soy milk, and 1 tablespoon nonfat powdered milk or protein powder..      Reviewed strategies to increase protein:  -Add ¼ cup nonfat dry milk powder or protein powder to make a high-protein milk to drink or to use in recipes that call for milk. Vanilla or cocoa powder could help to boost the flavor.  -Add hard-cooked eggs, leftover meat, grated cheese, canned beans or tofu to noodles, rice, salads,  sandwiches, soups, casseroles, pasta, tuna and other mixed dishes.  -Add powdered milk or protein powder to hot cereals, meatloaf, casseroles, scrambled eggs, sauces, cream soups, and shakes.  -Add beans and lentils to salads, soups, casseroles, and vegetable dishes.  -Eat cottage cheese or yogurt, especially Greek yogurt, with fruit as a snack or dessert.  -Eat peanut or other nut butters on crackers, bread, toast, waffles, apples, bananas or celery sticks. Add it to milkshakes, smoothies, or desserts.    Reviewed ideas to increase healthier fat intake:  -Snack on nuts and seeds or add them to foods like salads, pasta, cereals, yogurt, and ice cream.   -Sauté or stir-quiñones vegetables, meats, chicken, fish or tofu in olive or canola oil.   -Add olive oil, other vegetable oils, butter or margarine to soups, vegetables, potatoes, cooked cereal, rice, pasta, bread, crackers, pancakes, or waffles.  -Snack on olives or add to pasta, pizza, or salad.  -Add avocado or guacamole to your salads, sandwiches, and other entrees.  -Include fatty fish such as salmon in your weekly meal plan.  [x]Understanding Lab Values- 3/15 HDL 34, , cholesterol HDL ratio 5.0, recently statin was increased   []Basic Pathophysiology of Disease []Food/Medication Interactions   []Food Diary []Exercise   []Lifestyle/Behavior Modification Techniques []Medication, Mechanism of Action   []Label Reading: CHO/ Na/ Fat/ other_________ []Self Blood Glucose Monitoring   []Weight/BMI Goals: gain/lose/maintain []Other -           Comprehension: []Excellent  []Very Good  [x]Good  []Fair   []Poor    Receptivity: []Excellent  []Very Good  [x]Good  []Fair   []Poor    Expected Compliance: []Excellent  []Very Good  [x]Good  []Fair   []Poor        Goals (initial)/ Progress made on previous goals/new goals:  Consume 3 meals + snacks, don't skip meals by f/u   2. Add one protein shake a day by f/u   3. Add calories by adding avacados, olive oil, pb, nuts, seeds  "to foods by f/u  4. Add snacks in between meals such as cheese and grapes, peanut butter on toast, apple or celery, hummus with carrots, trail mix with nuts, seed, dried fruit and popcorn by f/u       No follow-ups on file.  Labs:  CMP  Lab Results   Component Value Date     02/14/2017    K 4.5 03/15/2024     03/15/2024    CO2 27 03/15/2024    ANIONGAP 5 08/14/2015    BUN 27 (H) 03/15/2024    CREATININE 0.93 03/15/2024    GLUCOSE 108 09/14/2019    GLUF 102 (H) 09/10/2021    CALCIUM 9.0 03/15/2024    CORRECTEDCA 9.5 06/20/2018    AST 19 03/15/2024    ALT 18 03/15/2024    ALKPHOS 34 (L) 03/15/2024    PROT 7.1 02/14/2017    BILITOT 0.6 02/14/2017    EGFR 91 03/15/2024       BMP  Lab Results   Component Value Date    GLUCOSE 108 09/14/2019    CALCIUM 9.0 03/15/2024     02/14/2017    K 4.5 03/15/2024    CO2 27 03/15/2024     03/15/2024    BUN 27 (H) 03/15/2024    CREATININE 0.93 03/15/2024       Lipids  Lab Results   Component Value Date    CHOL 147 02/14/2017    CHOL 175 01/19/2016    CHOL 156 05/20/2015     Lab Results   Component Value Date    HDL 34 (L) 03/15/2024    HDL 39 (L) 09/26/2023    HDL 39 (L) 03/16/2023     Lab Results   Component Value Date    LDLCALC 116 (H) 03/15/2024    LDLCALC 118 (H) 09/26/2023    LDLCALC 108 (H) 03/16/2023     Lab Results   Component Value Date    TRIG 97 03/15/2024    TRIG 141 09/26/2023    TRIG 113 03/16/2023     No results found for: \"CHOLHDL\"    Hemoglobin A1C  Lab Results   Component Value Date    HGBA1C 5.5 03/15/2024       Fasting Glucose  Lab Results   Component Value Date    GLUF 102 (H) 09/10/2021       Insulin     Thyroid  No results found for: \"TSH\", \"H6NYJET\", \"C3TTMZV\", \"THYROIDAB\"    Hepatic Function Panel  Lab Results   Component Value Date    ALT 18 03/15/2024    AST 19 03/15/2024    ALKPHOS 34 (L) 03/15/2024    BILITOT 0.6 02/14/2017       Celiac Disease Antibody Panel  No results found for: \"ENDOMYSIAL IGA\", \"GLIADIN IGA\", \"GLIADIN IGG\", " "\"IGA\", \"TISSUE TRANSGLUT AB\", \"TTG IGA\"   Iron  No results found for: \"IRON\", \"TIBC\", \"FERRITIN\"         Ghazal Sainz RD  [facility]IN[facility]UTRITION [facility]  [address] [place] [phone]    "

## 2024-09-12 ENCOUNTER — TELEPHONE (OUTPATIENT)
Age: 64
End: 2024-09-12

## 2024-09-12 NOTE — TELEPHONE ENCOUNTER
Pt called, had to cancel his AWV with provider 9/26. Pt states he is starting radiation treatments (which is why he cannot make it this date).  Prefers Dr. Chen - at this time 1st available AWV is not until 2/20/2025.  Patient is kindly requesting a return call, he cannot wait until next year, is requesting an appointment in October with the provider.  Kindly check with PCP, kindly call patient back to r/s AWV.  Thank you.

## 2024-09-13 NOTE — TELEPHONE ENCOUNTER
Patient called back stating he would like to schedule his AWV on 10/17 per the note. Janell (clerical) scheduled appointment.  Patient is aware.

## 2024-10-04 ENCOUNTER — TELEPHONE (OUTPATIENT)
Age: 64
End: 2024-10-04

## 2024-10-04 NOTE — TELEPHONE ENCOUNTER
Caller: Wayne Mosley    Doctor: Dr. Saldana    Reason for call: appt/checked chart for date last seen/2024/schedule in [place] office    Call back#: NA

## 2024-10-09 DIAGNOSIS — E78.2 MIXED HYPERLIPIDEMIA: ICD-10-CM

## 2024-10-09 RX ORDER — ROSUVASTATIN CALCIUM 20 MG/1
20 TABLET, COATED ORAL DAILY
Qty: 30 TABLET | Refills: 5 | Status: SHIPPED | OUTPATIENT
Start: 2024-10-09

## 2024-10-15 PROBLEM — Z98.890 S/P LEFT KNEE ARTHROSCOPY: Status: ACTIVE | Noted: 2024-03-18

## 2024-10-15 PROBLEM — D69.6 THROMBOCYTOPENIA (HCC): Status: ACTIVE | Noted: 2024-10-15

## 2024-10-15 LAB
ALBUMIN SERPL-MCNC: 4 G/DL (ref 3.6–5.1)
ALBUMIN/GLOB SERPL: 1.4 (CALC) (ref 1–2.5)
ALP SERPL-CCNC: 36 U/L (ref 35–144)
ALT SERPL-CCNC: 20 U/L (ref 9–46)
AST SERPL-CCNC: 20 U/L (ref 10–35)
BASOPHILS # BLD AUTO: 39 CELLS/UL (ref 0–200)
BASOPHILS NFR BLD AUTO: 0.9 %
BILIRUB SERPL-MCNC: 0.8 MG/DL (ref 0.2–1.2)
BUN SERPL-MCNC: 19 MG/DL (ref 7–25)
BUN/CREAT SERPL: NORMAL (CALC) (ref 6–22)
CALCIUM SERPL-MCNC: 9.2 MG/DL (ref 8.6–10.3)
CHLORIDE SERPL-SCNC: 103 MMOL/L (ref 98–110)
CHOLEST SERPL-MCNC: 168 MG/DL
CHOLEST/HDLC SERPL: 4.2 (CALC)
CO2 SERPL-SCNC: 28 MMOL/L (ref 20–32)
CREAT SERPL-MCNC: 0.84 MG/DL (ref 0.7–1.35)
EOSINOPHIL # BLD AUTO: 142 CELLS/UL (ref 15–500)
EOSINOPHIL NFR BLD AUTO: 3.3 %
ERYTHROCYTE [DISTWIDTH] IN BLOOD BY AUTOMATED COUNT: 13.3 % (ref 11–15)
GFR/BSA.PRED SERPLBLD CYS-BASED-ARV: 96 ML/MIN/1.73M2
GLOBULIN SER CALC-MCNC: 2.9 G/DL (CALC) (ref 1.9–3.7)
GLUCOSE SERPL-MCNC: 89 MG/DL (ref 65–99)
HBA1C MFR BLD: 5.6 % OF TOTAL HGB
HCT VFR BLD AUTO: 42.8 % (ref 38.5–50)
HDLC SERPL-MCNC: 40 MG/DL
HGB BLD-MCNC: 14 G/DL (ref 13.2–17.1)
LDLC SERPL CALC-MCNC: 107 MG/DL (CALC)
LYMPHOCYTES # BLD AUTO: 645 CELLS/UL (ref 850–3900)
LYMPHOCYTES NFR BLD AUTO: 15 %
MCH RBC QN AUTO: 29.3 PG (ref 27–33)
MCHC RBC AUTO-ENTMCNC: 32.7 G/DL (ref 32–36)
MCV RBC AUTO: 89.5 FL (ref 80–100)
MONOCYTES # BLD AUTO: 434 CELLS/UL (ref 200–950)
MONOCYTES NFR BLD AUTO: 10.1 %
NEUTROPHILS # BLD AUTO: 3040 CELLS/UL (ref 1500–7800)
NEUTROPHILS NFR BLD AUTO: 70.7 %
NONHDLC SERPL-MCNC: 128 MG/DL (CALC)
PLATELET # BLD AUTO: 124 THOUSAND/UL (ref 140–400)
PMV BLD REES-ECKER: 11 FL (ref 7.5–12.5)
POTASSIUM SERPL-SCNC: 4.3 MMOL/L (ref 3.5–5.3)
PROT SERPL-MCNC: 6.9 G/DL (ref 6.1–8.1)
RBC # BLD AUTO: 4.78 MILLION/UL (ref 4.2–5.8)
SODIUM SERPL-SCNC: 137 MMOL/L (ref 135–146)
TRIGL SERPL-MCNC: 115 MG/DL
TSH SERPL-ACNC: 1.22 MIU/L (ref 0.4–4.5)
WBC # BLD AUTO: 4.3 THOUSAND/UL (ref 3.8–10.8)

## 2024-10-17 ENCOUNTER — OFFICE VISIT (OUTPATIENT)
Dept: FAMILY MEDICINE CLINIC | Facility: CLINIC | Age: 64
End: 2024-10-17
Payer: MEDICARE

## 2024-10-17 VITALS
WEIGHT: 154 LBS | BODY MASS INDEX: 22.81 KG/M2 | HEIGHT: 69 IN | HEART RATE: 81 BPM | SYSTOLIC BLOOD PRESSURE: 104 MMHG | TEMPERATURE: 97.9 F | DIASTOLIC BLOOD PRESSURE: 72 MMHG | OXYGEN SATURATION: 99 %

## 2024-10-17 DIAGNOSIS — Z00.00 WELCOME TO MEDICARE PREVENTIVE VISIT: ICD-10-CM

## 2024-10-17 DIAGNOSIS — M48.062 SPINAL STENOSIS OF LUMBAR REGION WITH NEUROGENIC CLAUDICATION: ICD-10-CM

## 2024-10-17 DIAGNOSIS — Z00.00 MEDICARE ANNUAL WELLNESS VISIT, SUBSEQUENT: Primary | ICD-10-CM

## 2024-10-17 DIAGNOSIS — C61 PROSTATE CANCER (HCC): ICD-10-CM

## 2024-10-17 DIAGNOSIS — E78.2 MIXED HYPERLIPIDEMIA: ICD-10-CM

## 2024-10-17 DIAGNOSIS — D69.6 THROMBOCYTOPENIA (HCC): ICD-10-CM

## 2024-10-17 DIAGNOSIS — Z23 ENCOUNTER FOR IMMUNIZATION: ICD-10-CM

## 2024-10-17 DIAGNOSIS — Z98.890 S/P LEFT KNEE ARTHROSCOPY: ICD-10-CM

## 2024-10-17 DIAGNOSIS — R73.03 PREDIABETES: ICD-10-CM

## 2024-10-17 DIAGNOSIS — C79.11 SECONDARY MALIGNANT NEOPLASM OF BLADDER (HCC): ICD-10-CM

## 2024-10-17 PROCEDURE — 99214 OFFICE O/P EST MOD 30 MIN: CPT | Performed by: FAMILY MEDICINE

## 2024-10-17 PROCEDURE — G0008 ADMIN INFLUENZA VIRUS VAC: HCPCS

## 2024-10-17 PROCEDURE — G0402 INITIAL PREVENTIVE EXAM: HCPCS | Performed by: FAMILY MEDICINE

## 2024-10-17 PROCEDURE — 90662 IIV NO PRSV INCREASED AG IM: CPT

## 2024-10-17 PROCEDURE — G0403 EKG FOR INITIAL PREVENT EXAM: HCPCS | Performed by: FAMILY MEDICINE

## 2024-10-17 RX ORDER — EZETIMIBE 10 MG/1
10 TABLET ORAL DAILY
Qty: 90 TABLET | Refills: 3 | Status: SHIPPED | OUTPATIENT
Start: 2024-10-17 | End: 2025-10-12

## 2024-10-17 NOTE — PROGRESS NOTES
Ambulatory Visit  Name: Wayne Mosley      : 1960      MRN: [id number]  Encounter Provider: Martinez Chen DO  Encounter Date: 10/17/2024   Encounter department: [facility]    Assessment & Plan  Medicare annual wellness visit, subsequent  Patient presents for 64-year-old welcome to Medicare and med check appointment today.  Overall he is feeling well.  Recently, his PSAs have been slightly increasing, so his oncology team recommended starting course of radiation treatments.  Patient is in the middle of therapy now and overall tolerating well.  He is compliant with prescribed medications.  He had labs done on .  Orders:    POCT ECG    Prostate cancer (HCC)  Diagnosed in .  Status post robotic prostatectomy 2021 at [facility].  Patient was also found to have microinvasion of the bladder neck at that time, which was excised.  Recently, his PSAs have been slightly increasing.  He was advised by his cancer team to start radiation treatments.       Secondary malignant neoplasm of bladder (HCC)  (C prostate cancer)       Mixed hyperlipidemia  Cholesterol from  was 168, .  Patient recently had coronary calcium CT 2024.  Score was 4088 with LAU 88%).  At that time, rosuvastatin was increased to 20 mg daily.  He also had a stress test and echocardiogram 2024, which were both negative.  Due to increased ASCVD risk, will add Zetia (to rosuvastatin 20).  Recheck lipids and LFTs prior to next appointment  Orders:    ezetimibe (ZETIA) 10 mg tablet; Take 1 tablet (10 mg total) by mouth daily    Comprehensive metabolic panel; Future    Lipid Panel with Direct LDL reflex; Future    Comprehensive metabolic panel    Lipid Panel with Direct LDL reflex    Spinal stenosis of lumbar region with neurogenic claudication  History of spinal stenosis.  Overall stable       Thrombocytopenia (HCC)  Platelets from  were low at  124,000.  Previous platelets were normal.  In looking back at old her labs, patient does have intermittent history of platelet clumping.  Patient denies any bleeding issues.  Will repeat CBC prior to next appointment  Orders:    CBC and differential; Future    CBC and differential    Prediabetes  A1c stable at 5.6%.  Will continue to monitor       S/P left knee arthroscopy         Welcome to Medicare preventive visit    Orders:    POCT ECG; Future    Encounter for immunization    Orders:    influenza vaccine, high-dose, PF 0.5 mL (Fluzone High Dose)       Flu shot given today  Patient had COVID booster this season  RSV discussed    6 months, CBC, CMP, lipids prior      History of Present Illness     Patient presents for 64-year-old welcome to Medicare and med check appointment today.  Overall he is feeling well.  Recently, his PSAs have been slightly increasing, so his oncology team recommended starting course of radiation treatments.  Patient is in the middle of therapy now and overall tolerating well.  He is compliant with prescribed medications.  He had labs done on October 14.      Review of Systems   Respiratory: Negative.     Cardiovascular: Negative.    Gastrointestinal: Negative.    Genitourinary: Negative.      Past Medical History:   Diagnosis Date    Abnormal blood chemistry     Allergic     Allergic rhinitis     last assessed 08/22/2014    Anxiety     Cancer (HCC)     Elevated PSA     Erectile dysfunction of non-organic origin     Hyperglycemia     Last Assessed: 1/20/2016     Hyperlipidemia     Kidney stone     Paresthesia of foot     last assessed 08/14/2015     Past Surgical History:   Procedure Laterality Date    COLONOSCOPY      FOOT SURGERY      HAND SURGERY Left     HERNIA REPAIR      INGUINAL HERNIA REPAIR      KNEE SURGERY      SD COLONOSCOPY FLX DX W/COLLJ SPEC WHEN PFRMD N/A 01/10/2017    Procedure: COLONOSCOPY;  Surgeon: Car Barbour MD;  Location: [facility] GI LAB;  Service: Gastroenterology     OK CYSTO/URETERO W/LITHOTRIPSY &INDWELL STENT INSRT Left 02/01/2018    Procedure: CYSTOSCOPY, LEFT URETEROSCOPY, RETROGRADE PYELOGRAM;  Surgeon: James Whaley MD;  Location: [facility];  Service: Urology    PROSTATE BIOPSY  12/27/2019    PROSTATE SURGERY       Family History   Problem Relation Age of Onset    Lung cancer Mother     Cancer Mother     Lung cancer Father     Cancer Father      Social History     Tobacco Use    Smoking status: Never    Smokeless tobacco: Never   Vaping Use    Vaping status: Never Used   Substance and Sexual Activity    Alcohol use: Yes     Comment: occasional/social alcohol use     Drug use: No    Sexual activity: Not Currently     Partners: Female     Birth control/protection: Abstinence     Current Outpatient Medications on File Prior to Visit   Medication Sig    ascorbic acid (VITAMIN C) 500 mg tablet Take 1,000 mg by mouth daily    DAILY MULTIPLE VITAMINS/IRON PO Take 1 tablet by mouth daily    rosuvastatin (CRESTOR) 20 MG tablet TAKE 1 TABLET BY MOUTH EVERY DAY    tadalafil (CIALIS) 5 MG tablet 1 tab daily     Allergies   Allergen Reactions    Other      trees    Pollen Extract      Immunization History   Administered Date(s) Administered    COVID-19 MODERNA VACC 0.5 ML IM 03/27/2021, 04/24/2021, 10/25/2021, 06/02/2022    COVID-19 Moderna Vac BIVALENT 12 Yr+ IM 0.5 ML 11/14/2022    COVID-19 Moderna mRNA Vaccine 12 Yr+ 50 mcg/0.5 mL (Spikevax) 09/30/2024    Hep A / Hep B 03/06/2003, 03/06/2003, 05/23/2003, 05/23/2003, 11/10/2003, 11/10/2003    Influenza Quadrivalent, 6-35 Months IM 12/29/2015, 08/21/2017    Influenza Split High Dose Preservative Free IM 10/17/2024    Influenza, recombinant, quadrivalent,injectable, preservative free 09/05/2018, 01/16/2020, 11/09/2021, 10/31/2022    Influenza, seasonal, injectable 10/25/2013    Pneumococcal Conjugate Vaccine 20-valent (Pcv20), Polysace 03/13/2023    Tdap 03/09/2022    Zoster Vaccine Recombinant 08/01/2023, 11/14/2023  "    Objective     /72 (BP Location: Left arm, Patient Position: Sitting, Cuff Size: Standard)   Pulse 81   Temp 97.9 °F (36.6 °C) (Temporal)   Ht 5' 9\" (1.753 m)   Wt 69.9 kg (154 lb)   SpO2 99%   BMI 22.74 kg/m²     Physical Exam  Constitutional:       Appearance: Normal appearance.   Eyes:      Pupils: Pupils are equal, round, and reactive to light.   Neck:      Vascular: No carotid bruit.   Cardiovascular:      Rate and Rhythm: Normal rate and regular rhythm.      Pulses: Normal pulses.      Heart sounds: Normal heart sounds. No murmur heard.     No gallop.   Pulmonary:      Effort: Pulmonary effort is normal.      Breath sounds: Normal breath sounds.   Neurological:      Mental Status: He is alert.   Psychiatric:         Mood and Affect: Mood normal.         Behavior: Behavior normal.         Answers submitted by the patient for this visit:  Medicare Annual Wellness Visit (Submitted on 10/12/2024)  How would you rate your overall health?: good  Compared to last year, how is your physical health?: same  In general, how satisfied are you with your life?: satisfied  Compared to last year, how is your eyesight?: same  Compared to last year, how is your hearing?: same  Compared to last year, how is your emotional/mental health?: same  How often is anger a problem for you?: sometimes  How often do you feel unusually tired/fatigued?: sometimes  In the past 7 days, how much pain have you experienced?: some  If you answered \"some\" or \"a lot\", please rate the severity of your pain on a scale of 1 to 10 (1 being the least severe pain and 10 being the most intense pain).: 2/10  In the past 6 months, have you lost or gained 10 pounds without trying?: No  One or more falls in the last year: No  Do you have trouble with the stairs inside or outside your home?: No  Does your home have working smoke alarms?: Yes  Does your home have a carbon monoxide monitor?: Yes  Which safety hazards (if any) have you experienced " in your home? Please select all that apply.: none  How would you describe your current diet? Please select all that apply.: Low Cholesterol  In addition to prescription medications, are you taking any over-the-counter supplements?: No  Can you manage your medications?: Yes  Are you currently taking any opioid medications?: No  Can you walk and transfer into and out of your bed and chair?: Yes  Can you dress and groom yourself?: Yes  Can you bathe or shower yourself?: Yes  Can you feed yourself?: Yes  Can you do your laundry/ housekeeping?: Yes  Can you manage your money, pay your bills, and track your expenses?: Yes  Can you make your own meals?: Yes  Can you do your own shopping?: Yes  Within the last 12 months, have you had any hospitalizations or Emergency Department visits?: No  Do you have a living will?: Yes  Do you have a Durable POA (Power of ) for healthcare decisions?: Yes  Do you have an Advanced Directive for end of life decisions?: Yes  How often have you used an illegal drug (including marijuana) or a prescription medication for non-medical reasons in the past year?: never  What is the typical number of drinks you consume in a day?: 0  What is the typical number of drinks you consume in a week?: 1  How often did you have a drink containing alcohol in the past year?: 2 to 4 times a month  How many drinks did you have on a typical day  when you were drinking in the past year?: 0  How often did you have 6 or more drinks on one occasion in the past year?: never

## 2024-10-17 NOTE — ASSESSMENT & PLAN NOTE
Diagnosed in 2021.  Status post robotic prostatectomy November 2021 at [facility].  Patient was also found to have microinvasion of the bladder neck at that time, which was excised.  Recently, his PSAs have been slightly increasing.  He was advised by his cancer team to start radiation treatments.

## 2024-10-17 NOTE — PATIENT INSTRUCTIONS
Medicare Preventive Visit Patient Instructions  Thank you for completing your Welcome to Medicare Visit or Medicare Annual Wellness Visit today. Your next wellness visit will be due in one year (10/18/2025).  The screening/preventive services that you may require over the next 5-10 years are detailed below. Some tests may not apply to you based off risk factors and/or age. Screening tests ordered at today's visit but not completed yet may show as past due. Also, please note that scanned in results may not display below.  Preventive Screenings:  Service Recommendations Previous Testing/Comments   Colorectal Cancer Screening  Colonoscopy    Fecal Occult Blood Test (FOBT)/Fecal Immunochemical Test (FIT)  Fecal DNA/Cologuard Test  Flexible Sigmoidoscopy Age: 45-75 years old   Colonoscopy: every 10 years (May be performed more frequently if at higher risk)  OR  FOBT/FIT: every 1 year  OR  Cologuard: every 3 years  OR  Sigmoidoscopy: every 5 years  Screening may be recommended earlier than age 45 if at higher risk for colorectal cancer. Also, an individualized decision between you and your healthcare provider will decide whether screening between the ages of 76-85 would be appropriate. Colonoscopy: 05/16/2022  FOBT/FIT: Not on file  Cologuard: Not on file  Sigmoidoscopy: Not on file          Prostate Cancer Screening Individualized decision between patient and health care provider in men between ages of 55-69   Medicare will cover every 12 months beginning on the day after your 50th birthday PSA: 5.4 ng/mL           Hepatitis C Screening Once for adults born between 1945 and 1965  More frequently in patients at high risk for Hepatitis C Hep C Antibody: Not on file        Diabetes Screening 1-2 times per year if you're at risk for diabetes or have pre-diabetes Fasting glucose: 102 mg/dL (9/10/2021)  A1C: 5.6 % of total Hgb (10/14/2024)      Cholesterol Screening Once every 5 years if you don't have a lipid disorder. May  order more often based on risk factors. Lipid panel: 10/14/2024         Other Preventive Screenings Covered by Medicare:  Abdominal Aortic Aneurysm (AAA) Screening: covered once if your at risk. You're considered to be at risk if you have a family history of AAA or a male between the age of 65-75 who smoking at least 100 cigarettes in your lifetime.  Lung Cancer Screening: covers low dose CT scan once per year if you meet all of the following conditions: (1) Age 55-77; (2) No signs or symptoms of lung cancer; (3) Current smoker or have quit smoking within the last 15 years; (4) You have a tobacco smoking history of at least 20 pack years (packs per day x number of years you smoked); (5) You get a written order from a healthcare provider.  Glaucoma Screening: covered annually if you're considered high risk: (1) You have diabetes OR (2) Family history of glaucoma OR (3)  aged 50 and older OR (4)  American aged 65 and older  Osteoporosis Screening: covered every 2 years if you meet one of the following conditions: (1) Have a vertebral abnormality; (2) On glucocorticoid therapy for more than 3 months; (3) Have primary hyperparathyroidism; (4) On osteoporosis medications and need to assess response to drug therapy.  HIV Screening: covered annually if you're between the age of 15-65. Also covered annually if you are younger than 15 and older than 65 with risk factors for HIV infection. For pregnant patients, it is covered up to 3 times per pregnancy.    Immunizations:  Immunization Recommendations   Influenza Vaccine Annual influenza vaccination during flu season is recommended for all persons aged >= 6 months who do not have contraindications   Pneumococcal Vaccine   * Pneumococcal conjugate vaccine = PCV13 (Prevnar 13), PCV15 (Vaxneuvance), PCV20 (Prevnar 20)  * Pneumococcal polysaccharide vaccine = PPSV23 (Pneumovax) Adults 19-63 yo with certain risk factors or if 65+ yo  If never received any  pneumonia vaccine: recommend Prevnar 20 (PCV20)  Give PCV20 if previously received 1 dose of PCV13 or PPSV23   Hepatitis B Vaccine 3 dose series if at intermediate or high risk (ex: diabetes, end stage renal disease, liver disease)   Respiratory syncytial virus (RSV) Vaccine - COVERED BY MEDICARE PART D  * RSVPreF3 (Arexvy) CDC recommends that adults 60 years of age and older may receive a single dose of RSV vaccine using shared clinical decision-making (SCDM)   Tetanus (Td) Vaccine - COST NOT COVERED BY MEDICARE PART B Following completion of primary series, a booster dose should be given every 10 years to maintain immunity against tetanus. Td may also be given as tetanus wound prophylaxis.   Tdap Vaccine - COST NOT COVERED BY MEDICARE PART B Recommended at least once for all adults. For pregnant patients, recommended with each pregnancy.   Shingles Vaccine (Shingrix) - COST NOT COVERED BY MEDICARE PART B  2 shot series recommended in those 19 years and older who have or will have weakened immune systems or those 50 years and older     Health Maintenance Due:      Topic Date Due   • Hepatitis C Screening  Never done   • Colorectal Cancer Screening  05/15/2027     Immunizations Due:      Topic Date Due   • Influenza Vaccine (1) 09/01/2024     Advance Directives   What are advance directives?  Advance directives are legal documents that state your wishes and plans for medical care. These plans are made ahead of time in case you lose your ability to make decisions for yourself. Advance directives can apply to any medical decision, such as the treatments you want, and if you want to donate organs.   What are the types of advance directives?  There are many types of advance directives, and each state has rules about how to use them. You may choose a combination of any of the following:  Living will:  This is a written record of the treatment you want. You can also choose which treatments you do not want, which to limit,  and which to stop at a certain time. This includes surgery, medicine, IV fluid, and tube feedings.   Durable power of  for healthcare (DPAHC):  This is a written record that states who you want to make healthcare choices for you when you are unable to make them for yourself. This person, called a proxy, is usually a family member or a friend. You may choose more than 1 proxy.  Do not resuscitate (DNR) order:  A DNR order is used in case your heart stops beating or you stop breathing. It is a request not to have certain forms of treatment, such as CPR. A DNR order may be included in other types of advance directives.  Medical directive:  This covers the care that you want if you are in a coma, near death, or unable to make decisions for yourself. You can list the treatments you want for each condition. Treatment may include pain medicine, surgery, blood transfusions, dialysis, IV or tube feedings, and a ventilator (breathing machine).  Values history:  This document has questions about your views, beliefs, and how you feel and think about life. This information can help others choose the care that you would choose.  Why are advance directives important?  An advance directive helps you control your care. Although spoken wishes may be used, it is better to have your wishes written down. Spoken wishes can be misunderstood, or not followed. Treatments may be given even if you do not want them. An advance directive may make it easier for your family to make difficult choices about your care.       © Copyright [employer] 2018 Information is for End User's use only and may not be sold, redistributed or otherwise used for commercial purposes. All illustrations and images included in CareNotes® are the copyrighted property of [employer] or [employer]

## 2024-10-17 NOTE — ASSESSMENT & PLAN NOTE
Platelets from October 14 were low at 124,000.  Previous platelets were normal.  In looking back at old her labs, patient does have intermittent history of platelet clumping.  Patient denies any bleeding issues.  Will repeat CBC prior to next appointment  Orders:    CBC and differential; Future    CBC and differential

## 2024-10-17 NOTE — PROGRESS NOTES
Welcome to Medicare    Ambulatory Visit  Name: Wayne Mosley      : 1960      MRN: [id number]  Encounter Provider: Martinez Chen DO  Encounter Date: 10/17/2024   Encounter department: [facility]    Assessment & Plan       Preventive health issues were discussed with patient, and age appropriate screening tests were ordered as noted in patient's After Visit Summary. Personalized health advice and appropriate referrals for health education or preventive services given if needed, as noted in patient's After Visit Summary.    History of Present Illness     HPI   Patient Care Team:  Martinez Chen DO as PCP - General  MD Martinez Álvarez DO Ayaz Matin, MD as Endoscopist    Review of Systems  Medical History Reviewed by provider this encounter:       Annual Wellness Visit Questionnaire   Wayne is here for his Welcome to Medicare visit.     Health Risk Assessment:   Patient rates overall health as good. Patient feels that their physical health rating is same. Patient is satisfied with their life. Eyesight was rated as same. Hearing was rated as same. Patient feels that their emotional and mental health rating is same. Patients states they are sometimes angry. Patient states they are sometimes unusually tired/fatigued. Pain experienced in the last 7 days has been some. Patient's pain rating has been 2/10. Patient states that he has experienced no weight loss or gain in last 6 months.     Depression Screening:   PHQ-2 Score: 1      Fall Risk Screening:   In the past year, patient has experienced: no history of falling in past year      Home Safety:  Patient does not have trouble with stairs inside or outside of their home. Patient has working smoke alarms and has working carbon monoxide detector. Home safety hazards include: none.     Nutrition:   Current diet is Low Cholesterol.     Medications:   Patient is not currently taking any over-the-counter supplements.  Patient is able to manage medications.     Activities of Daily Living (ADLs)/Instrumental Activities of Daily Living (IADLs):   Walk and transfer into and out of bed and chair?: Yes  Dress and groom yourself?: Yes    Bathe or shower yourself?: Yes    Feed yourself? Yes  Do your laundry/housekeeping?: Yes  Manage your money, pay your bills and track your expenses?: Yes  Make your own meals?: Yes    Do your own shopping?: Yes    Previous Hospitalizations:   Any hospitalizations or ED visits within the last 12 months?: No      Advance Care Planning:   Living will: Yes    Durable POA for healthcare: Yes    Advanced directive: Yes      PREVENTIVE SCREENINGS      Cardiovascular Screening:    General: Screening Not Indicated and History Lipid Disorder      Diabetes Screening:     General: Screening Current      Colorectal Cancer Screening:     General: Screening Current      Prostate Cancer Screening:    General: History Prostate Cancer      Osteoporosis Screening:    General: Risks and Benefits Discussed      Abdominal Aortic Aneurysm (AAA) Screening:    Risk factors include: age between 65-76 yo        General: Risks and Benefits Discussed      Lung Cancer Screening:     General: Screening Not Indicated      Hepatitis C Screening:    General: Risks and Benefits Discussed    Screening, Brief Intervention, and Referral to Treatment (SBIRT)    Screening  Typical number of drinks in a day: 0  Typical number of drinks in a week: 1  Interpretation: Low risk drinking behavior.    AUDIT-C Screenin) How often did you have a drink containing alcohol in the past year? 2 to 4 times a month  2) How many drinks did you have on a typical day when you were drinking in the past year? 0  3) How often did you have 6 or more drinks on one occasion in the past year? never    AUDIT-C Score: 2  Interpretation: Score 0-3 (male): Negative screen for alcohol misuse    Single Item Drug Screening:  How often have you used an illegal drug  (including marijuana) or a prescription medication for non-medical reasons in the past year? never    Single Item Drug Screen Score: 0  Interpretation: Negative screen for possible drug use disorder    Social Determinants of Health     Financial Resource Strain: Low Risk  (9/4/2024)    Received from [facility]    Overall Financial Resource Strain (CARDIA)     Difficulty of Paying Living Expenses: Not very hard   Food Insecurity: No Food Insecurity (10/12/2024)    Hunger Vital Sign     Worried About Running Out of Food in the Last Year: Never true     Ran Out of Food in the Last Year: Never true   Transportation Needs: No Transportation Needs (10/12/2024)    PRAPARE - Transportation     Lack of Transportation (Medical): No     Lack of Transportation (Non-Medical): No   Housing Stability: Low Risk  (10/12/2024)    Housing Stability Vital Sign     Unable to Pay for Housing in the Last Year: No     Number of Times Moved in the Last Year: 0     Homeless in the Last Year: No   Utilities: Not At Risk (10/12/2024)    [place] Utilities     Threatened with loss of utilities: No     No results found.    Objective     There were no vitals taken for this visit.    Physical Exam  Vitals and nursing note reviewed.   Constitutional:       General: He is not in acute distress.     Appearance: Normal appearance. He is well-developed.   HENT:      Head: Normocephalic and atraumatic.   Eyes:      Conjunctiva/sclera: Conjunctivae normal.      Pupils: Pupils are equal, round, and reactive to light.   Neck:      Vascular: No carotid bruit.   Cardiovascular:      Rate and Rhythm: Normal rate and regular rhythm.      Pulses: Normal pulses.      Heart sounds: Normal heart sounds. No murmur heard.     No gallop.   Pulmonary:      Effort: Pulmonary effort is normal. No respiratory distress.      Breath sounds: Normal breath sounds.   Abdominal:      Palpations: Abdomen is soft.      Tenderness: There is no abdominal tenderness.    Musculoskeletal:         General: No swelling.      Cervical back: Neck supple.   Skin:     General: Skin is warm and dry.      Capillary Refill: Capillary refill takes less than 2 seconds.   Neurological:      Mental Status: He is alert.   Psychiatric:         Mood and Affect: Mood normal.         Behavior: Behavior normal.

## 2024-10-17 NOTE — ASSESSMENT & PLAN NOTE
Cholesterol from October 14 was 168, .  Patient recently had coronary calcium CT March 28, 2024.  Score was 4088 with LAU 88%).  At that time, rosuvastatin was increased to 20 mg daily.  He also had a stress test and echocardiogram August 2024, which were both negative.  Due to increased ASCVD risk, will add Zetia (to rosuvastatin 20).  Recheck lipids and LFTs prior to next appointment  Orders:    ezetimibe (ZETIA) 10 mg tablet; Take 1 tablet (10 mg total) by mouth daily    Comprehensive metabolic panel; Future    Lipid Panel with Direct LDL reflex; Future    Comprehensive metabolic panel    Lipid Panel with Direct LDL reflex

## 2024-10-22 ENCOUNTER — OFFICE VISIT (OUTPATIENT)
Dept: PODIATRY | Facility: CLINIC | Age: 64
End: 2024-10-22
Payer: MEDICARE

## 2024-10-22 VITALS
BODY MASS INDEX: 22.36 KG/M2 | DIASTOLIC BLOOD PRESSURE: 61 MMHG | HEIGHT: 69 IN | RESPIRATION RATE: 18 BRPM | SYSTOLIC BLOOD PRESSURE: 90 MMHG | WEIGHT: 151 LBS | HEART RATE: 70 BPM

## 2024-10-22 DIAGNOSIS — M79.674 PAIN IN TOE OF RIGHT FOOT: ICD-10-CM

## 2024-10-22 DIAGNOSIS — L60.0 INGROWN TOENAIL: Primary | ICD-10-CM

## 2024-10-22 PROCEDURE — RECHECK: Performed by: PODIATRIST

## 2024-10-22 PROCEDURE — 11750 EXCISION NAIL&NAIL MATRIX: CPT | Performed by: PODIATRIST

## 2024-10-22 RX ORDER — LIDOCAINE HYDROCHLORIDE AND EPINEPHRINE 10; 10 MG/ML; UG/ML
1 INJECTION, SOLUTION INFILTRATION; PERINEURAL ONCE
Status: COMPLETED | OUTPATIENT
Start: 2024-10-22 | End: 2024-10-22

## 2024-10-22 RX ORDER — LIDOCAINE HYDROCHLORIDE 10 MG/ML
1 INJECTION, SOLUTION EPIDURAL; INFILTRATION; INTRACAUDAL; PERINEURAL ONCE
Status: COMPLETED | OUTPATIENT
Start: 2024-10-22 | End: 2024-10-22

## 2024-10-22 RX ADMIN — LIDOCAINE HYDROCHLORIDE AND EPINEPHRINE 1 ML: 10; 10 INJECTION, SOLUTION INFILTRATION; PERINEURAL at 14:02

## 2024-10-22 RX ADMIN — LIDOCAINE HYDROCHLORIDE 1 ML: 10 INJECTION, SOLUTION EPIDURAL; INFILTRATION; INTRACAUDAL; PERINEURAL at 14:02

## 2024-10-22 NOTE — PROGRESS NOTES
Patient presents with recurrence of an ingrown nail along the lateral nail border of the right third toe.  This has been a chronic problem for this patient.    He notes that he is currently under chemotherapy  for prostate cancer.  He has had no adverse effects from this treatment.    In regards to the ingrown nail, recommended partial matrixectomy along the lateral nail border.  The goal of this procedure is permanent elimination of the ingrown nail.  Anesthesia via 3 cc of a 1: 1.  1% Xylocaine plain and 1% Xylocaine with epinephrine.  Betadine prep was performed.  Lateral nail border of the right  third toe was avulsed to the eponychium.  Partial matrixectomy performed utilizing phenol in standard manner.  Bacitracin dressing applied.  Patient to soak twice daily in warm water followed by Neosporin dressing.  Reappoint 1 week.    Nail removal    Date/Time: 10/22/2024 1:30 PM    Performed by: Tray Saldana DPM  Authorized by: Tray Saldana DPM    Patient location:  ClinicUniversal Protocol:  procedure performed by consultantConsent: Verbal consent obtained.  Risks and benefits: risks, benefits and alternatives were discussed  Consent given by: patient  Patient understanding: patient states understanding of the procedure being performed    Location:     Foot:  R third toe  Pre-procedure details:     Skin preparation:  Betadine  Anesthesia (see MAR for exact dosages):     Anesthesia method:  Nerve block    Block needle gauge:  25 G    Block anesthetic:  Lidocaine 1% WITH epi and lidocaine 1% w/o epi    Block injection procedure:  Anatomic landmarks identified    Block outcome:  Anesthesia achieved  Nail Removal:     Nail removed:  Partial    Nail side:  Lateral    Nail bed sutured: no    Ingrown nail:     Wedge excision of skin: no      Nail matrix removed or ablated:  Partial  Post-procedure details:     Dressing:  4x4 sterile gauze, antibiotic ointment and gauze roll    Patient tolerance of procedure:   Tolerated well, no immediate complications

## 2024-10-31 ENCOUNTER — OFFICE VISIT (OUTPATIENT)
Dept: PODIATRY | Facility: CLINIC | Age: 64
End: 2024-10-31

## 2024-10-31 VITALS — HEIGHT: 69 IN | RESPIRATION RATE: 18 BRPM | WEIGHT: 150 LBS | BODY MASS INDEX: 22.22 KG/M2

## 2024-10-31 DIAGNOSIS — L60.0 INGROWN TOENAIL: Primary | ICD-10-CM

## 2024-10-31 PROCEDURE — 99024 POSTOP FOLLOW-UP VISIT: CPT | Performed by: PODIATRIST

## 2024-10-31 NOTE — PROGRESS NOTES
Patient presents 1 week post partial matrixectomy lateral nail border right third toe.  Minimal discomfort related.  Surgical site healing with mild drainage within normal limits for procedure performed.  Patient may discontinue soaks and Neosporin.  Recommend continue with dry sterile dressing until eschar forms.  Reappoint as needed

## 2024-11-05 DIAGNOSIS — E78.2 MIXED HYPERLIPIDEMIA: ICD-10-CM

## 2024-11-06 RX ORDER — ROSUVASTATIN CALCIUM 20 MG/1
20 TABLET, COATED ORAL DAILY
Qty: 90 TABLET | Refills: 1 | Status: SHIPPED | OUTPATIENT
Start: 2024-11-06

## 2024-11-19 ENCOUNTER — CLINICAL SUPPORT (OUTPATIENT)
Dept: NUTRITION | Facility: HOSPITAL | Age: 64
End: 2024-11-19
Payer: MEDICARE

## 2024-11-19 VITALS — WEIGHT: 153.7 LBS | HEIGHT: 69 IN | BODY MASS INDEX: 22.77 KG/M2

## 2024-11-19 DIAGNOSIS — E78.2 MIXED HYPERLIPIDEMIA: Primary | ICD-10-CM

## 2024-11-19 NOTE — PROGRESS NOTES
"Follow-Up Nutrition Assessment Form    Patient Name: Wayne Mosley    YOB: 1960    Sex: Male      Follow Up Date: 11/19/2024  Start Time: 9am Stop Time: 9:30am Total Minutes: 30 mins     Data:  Present at session: self   Parent/Patient Concerns: Had radiation in September for 8 weeks, had some issues with stomach but was still able to eat, bowel habits changed to loose but not diarrhea   Medical Dx/Reason for Referral:   R93.1 (ICD-10-CM) - Elevated coronary artery calcium [id number] (ICD-10-CM) - Mixed hyperlipidemia      Past Medical History:   Diagnosis Date    Abnormal blood chemistry     Allergic     Allergic rhinitis     last assessed 08/22/2014    Anxiety     Cancer (HCC)     Elevated PSA     Erectile dysfunction of non-organic origin     Hyperglycemia     Last Assessed: 1/20/2016     Hyperlipidemia     Kidney stone     Paresthesia of foot     last assessed 08/14/2015       Current Outpatient Medications   Medication Sig Dispense Refill    ascorbic acid (VITAMIN C) 500 mg tablet Take 1,000 mg by mouth daily      DAILY MULTIPLE VITAMINS/IRON PO Take 1 tablet by mouth daily      ezetimibe (ZETIA) 10 mg tablet Take 1 tablet (10 mg total) by mouth daily 90 tablet 3    rosuvastatin (CRESTOR) 20 MG tablet TAKE 1 TABLET BY MOUTH EVERY DAY 90 tablet 1    tadalafil (CIALIS) 5 MG tablet 1 tab daily 90 tablet 0     No current facility-administered medications for this visit.        Additional Meds/Supplements:    Barriers to Learning:    Labs:    Height: Ht Readings from Last 3 Encounters:   11/19/24 5' 9\" (1.753 m)   10/31/24 5' 9\" (1.753 m)   10/22/24 5' 9\" (1.753 m)      Weight: Wt Readings from Last 10 Encounters:   11/19/24 69.7 kg (153 lb 11.2 oz)   10/31/24 68 kg (150 lb)   10/22/24 68.5 kg (151 lb)   10/17/24 69.9 kg (154 lb)   08/20/24 68.8 kg (151 lb 9.6 oz)   08/06/24 67.6 kg (149 lb)   08/06/24 67.6 kg (149 lb)   05/16/24 67.6 kg (149 lb)   04/02/24 70.8 kg (156 lb)   03/18/24 70.8 kg (156 " "lb)     Estimated body mass index is 22.7 kg/m² as calculated from the following:    Height as of this encounter: 5' 9\" (1.753 m).    Weight as of this encounter: 69.7 kg (153 lb 11.2 oz).   Wt. Change Since Last Visit: [x]Yes     []No  Amount: 4lbs/2.6% gain x 3 months      Energy Needs: 2091-2440 30-35cal/kg, 84g 1.2g/kg, 2091-2440mL 1mL/laxmi   Pain Screen: Are you having pain now?       Previous Goals or Goals Achieved:     Consume 3 meals + snacks, don't skip meals by f/u- 11/19 has been eating 3 meals but not snacking in between, continue by f/u   2. Add one protein shake a day by f/u- 11/19 started to drink one to two but wasn't consistent, continue by f/u   3. Add calories by adding avacados, olive oil, pb, nuts, seeds to foods by f/u-11/19 added jelly/butter, to english muffin, consider adding nuts to oatmeal, and pb with jelly to the english muffin, continue by f/u  4. Add snacks in between meals such as cheese and grapes, peanut butter on toast, apple or celery, hummus with carrots, trail mix with nuts, seed, dried fruit and popcorn by f/u-11/19 not met, continue by f/u     New Goals:   1.   2.   3.       Initial PES:    Inadequate energy intake  related to Food and nutrition related knowledge deficit concerning energy intake as evidenced by  Estimated energy intake from diet less than needs based on estimated or measured resting metabolic rate-improving    New PES:       New Problem List:  1.   2.   3.       Assessment:  Wayne presents for f/u. Since last visit, Wayne explained had radiation for the past 8 weeks for hx of prostate CA, he didn't feel it affected appetite, reported only causing bowel to become looser than usual.  Reviewed lipid profile which has improved and has been able to gain weight. Wayne would like to continue to gain weight with goal being 160lbs. Diet hx B: 2 eggs/toast or english muffin, sausage links or oatmeal with raisins with english muffin, L: elba&SHA or Brandin guerra, " trail mix, apple, D: spaghetti chrimp, scallops, or chicken cutlet mashed potatoe mixed veggies, tried oral nutrition supplements but not consistent, likes sweets sometimes has tasty cakes. Discussed heart healthy diet and tips for increasing pro, laxmi intake and being consistent with oral nutrition supplement or snacks in between meals to help maximize pro, laxmi intake.     Wayne feels he has all the information he needs at this time and will call if decides he wants to make another f/u appointment, RD contact card provided.     Medical Nutrition Therapy Intervention:  [x]Individualized Meal Plan-Recommend Heart healthy diet, consuming 3 meals and snacks in between meals, avoid skipping meals.  Reviewed MyPlate method for balancing meals.  Discussed saturated fat versus healthier fat options, encouraged high fiber intake. Reviewed High Protein, High Calorie Nutrition Therapy Diet education.  Provided ideas to maximize calorie intake at meals and snacks using calorie dense foods such as:   -Drink milk, oral nutrition supplements (provided coupons for Ensure), smoothies instead of low-calorie beverages such as diet drinks or water.  -Add jelly, jam, honey, whipped butter or plant based margarine to bread and crackers.   -Mix dried fruit, nuts, granola, honey, or dry cereal with yogurt or hot cereals.  -Blend a fruit smoothie of a banana, frozen berries, milk or soy milk, and 1 tablespoon nonfat powdered milk or protein powder..       Reviewed strategies to increase protein:  -Add ¼ cup nonfat dry milk powder or protein powder to make a high-protein milk to drink or to use in recipes that call for milk. Vanilla or cocoa powder could help to boost the flavor.  -Add hard-cooked eggs, leftover meat, grated cheese, canned beans or tofu to noodles, rice, salads, sandwiches, soups, casseroles, pasta, tuna and other mixed dishes.  -Add powdered milk or protein powder to hot cereals, meatloaf, casseroles, scrambled eggs,  sauces, cream soups, and shakes.  -Add beans and lentils to salads, soups, casseroles, and vegetable dishes.  -Eat cottage cheese or yogurt, especially Greek yogurt, with fruit as a snack or dessert.  -Eat peanut or other nut butters on crackers, bread, toast, waffles, apples, bananas or celery sticks. Add it to milkshakes, smoothies, or desserts.     Reviewed ideas to increase healthier fat intake:  -Snack on nuts and seeds or add them to foods like salads, pasta, cereals, yogurt, and ice cream.   -Sauté or stir-quiñones vegetables, meats, chicken, fish or tofu in olive or canola oil.   -Add olive oil, other vegetable oils, butter or margarine to soups, vegetables, potatoes, cooked cereal, rice, pasta, bread, crackers, pancakes, or waffles.  -Snack on olives or add to pasta, pizza, or salad.  -Add avocado or guacamole to your salads, sandwiches, and other entrees.  -Include fatty fish such as salmon in your weekly meal plan.  [x]Understanding Lab Values reviewed lipid profile, HDL and LDL and chol/HDL ration improved, 10/14    []Basic Pathophysiology of Disease []Food/Medication Interactions   []Food Diary []Exercise   []Lifestyle/Behavior Modification Techniques []Medication, Mechanism of Action   []Label Reading []Self Blood Glucose Monitoring   []Weight/BMI Goals []Other -    Other Notes:        Comprehension: []Excellent  []Very Good  [x]Good  []Fair   []Poor    Receptivity: []Excellent  []Very Good  [x]Good  []Fair   []Poor    Expected Compliance: []Excellent  []Very Good  [x]Good  []Fair   []Poor      Labs:  CMP  Lab Results   Component Value Date     02/14/2017    K 4.3 10/14/2024     10/14/2024    CO2 28 10/14/2024    ANIONGAP 5 08/14/2015    BUN 19 10/14/2024    CREATININE 0.84 10/14/2024    GLUCOSE 108 09/14/2019    GLUF 102 (H) 09/10/2021    CALCIUM 9.2 10/14/2024    CORRECTEDCA 9.5 06/20/2018    AST 20 10/14/2024    ALT 20 10/14/2024    ALKPHOS 36 10/14/2024    PROT 7.1 02/14/2017     "BILITOT 0.6 02/14/2017    EGFR 96 10/14/2024       BMP  Lab Results   Component Value Date    GLUCOSE 108 09/14/2019    CALCIUM 9.2 10/14/2024     02/14/2017    K 4.3 10/14/2024    CO2 28 10/14/2024     10/14/2024    BUN 19 10/14/2024    CREATININE 0.84 10/14/2024       Lipids  Lab Results   Component Value Date    CHOL 147 02/14/2017    CHOL 175 01/19/2016    CHOL 156 05/20/2015     Lab Results   Component Value Date    HDL 40 10/14/2024    HDL 34 (L) 03/15/2024    HDL 39 (L) 09/26/2023     Lab Results   Component Value Date    LDLCALC 107 (H) 10/14/2024    LDLCALC 116 (H) 03/15/2024    LDLCALC 118 (H) 09/26/2023     Lab Results   Component Value Date    TRIG 115 10/14/2024    TRIG 97 03/15/2024    TRIG 141 09/26/2023     No results found for: \"CHOLHDL\"    Hemoglobin A1C  Lab Results   Component Value Date    HGBA1C 5.6 10/14/2024       Fasting Glucose  Lab Results   Component Value Date    GLUF 102 (H) 09/10/2021       Insulin     Thyroid  No results found for: \"TSH\", \"S6OMDJI\", \"E6SHONU\", \"THYROIDAB\"    Hepatic Function Panel  Lab Results   Component Value Date    ALT 20 10/14/2024    AST 20 10/14/2024    ALKPHOS 36 10/14/2024    BILITOT 0.6 02/14/2017       Celiac Disease Antibody Panel  No results found for: \"ENDOMYSIAL IGA\", \"GLIADIN IGA\", \"GLIADIN IGG\", \"IGA\", \"TISSUE TRANSGLUT AB\", \"TTG IGA\"   Iron  No results found for: \"IRON\", \"TIBC\", \"FERRITIN\"    Vitamins  No results found for: \"VITAMIN B2\"   No results found for: \"NICOTINAMIDE\", \"NICOTINIC ACID\"   No results found for: \"VITAMINB6\"  Lab Results   Component Value Date    PNLIKUTR51 532 08/14/2015     No results found for: \"VITB5\"  No results found for: \"V7QNILHL\"  No results found for: \"THYROGLB\"  No results found for: \"VITAMIN K\"   No results found for: \"25-HYDROXY VIT D\"   No components found for: \"VITAMINE\"     No follow-ups on file.    Ghazal Sainz RD  [facility] " CLINICAL NUTRITION SERVICES  [address] [place] [address]  [place] [address]

## 2024-12-23 ENCOUNTER — OFFICE VISIT (OUTPATIENT)
Dept: PHYSICAL THERAPY | Facility: REHABILITATION | Age: 64
End: 2024-12-23
Payer: MEDICARE

## 2024-12-23 DIAGNOSIS — Z96.652 HISTORY OF ARTHROPLASTY OF LEFT KNEE: ICD-10-CM

## 2024-12-23 DIAGNOSIS — M25.562 ACUTE PAIN OF LEFT KNEE: Primary | ICD-10-CM

## 2024-12-23 PROCEDURE — 97161 PT EVAL LOW COMPLEX 20 MIN: CPT | Performed by: PHYSICAL THERAPIST

## 2024-12-23 NOTE — PROGRESS NOTES
PT Evaluation     Today's date: 2024  Patient name: Wayne Mosley  : 1960  MRN: [id number]  Referring provider: Danny Tay PT  Dx:   Encounter Diagnosis     ICD-10-CM    1. Acute pain of left knee  M25.562       2. History of arthroplasty of left knee  Z96.652           Start Time: 1745  Stop Time: 1825  Total time in clinic (min): 40 minutes    Assessment  Impairments: abnormal muscle firing, abnormal or restricted ROM, abnormal movement, activity intolerance, impaired physical strength, pain with function, poor body mechanics and activity limitations    Assessment details: Patient is a 64 y.o. male presenting to direct access initial examination with chief complaint of L knee pain. No red flags or other signs of non-musculoskeletal involvement present. Primary impairments include hip abduction/extension strength deficits, single leg balance deficits, and single leg hop asymmetry. 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 Educated patient regarding plan of care and answered all patient questions to patient satisfaction. Patient would benefit from skilled PT interventions to address above impairments in order to maximize functional capacity.       Prognosis: good    Goals  Impairment Goals: 4-6 weeks  - Patient to decrease pain to 0/10  - Patient to improve single leg hop symmetry to 90%    Functional Goals: by discharge  - Patient to discharge to independent [facility]  - Patient to improve subjective functional level to 85%  - Patient to improve tolerance to lateral movements   - Patient to improve tolerance to quick changes of direction     Plan  Patient would benefit from: skilled physical therapy  Planned modality interventions: cryotherapy, TENS and thermotherapy: hydrocollator packs    Planned therapy interventions: flexibility, home exercise program, joint  mobilization, manual therapy, neuromuscular re-education, patient education, strengthening, stretching, therapeutic activities, therapeutic exercise and functional ROM exercises    Frequency: 1x week  Duration in weeks: 8  Treatment plan discussed with: patient        Subjective Evaluation    History of Present Illness  Mechanism of injury: HISTORY OF PRESENT ILLNESS: Patient is known to this clinic as he had completed PT s/p L TKA 24. 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 He also has a hard time with lateral movements.   PRIOR TREATMENT: PT  AGGRAVATING FACTORS: N/A  EASING FACTORS: N/A  WORK:  and referee (2 games per week)  IMAGING: none  FUNCTIONAL LIMITATIONS: refereeing basketball, quick changes of direction, and lateral movements  SUBJECTIVE FUNCTIONAL LEVEL: 70%  PATIENT GOAL: to get this stiffness out  Pain  Current pain ratin  At best pain ratin  At worst pain ratin  Location: L knee          Objective     Neurological Testing     Sensation     Lumbar   Left   Intact: light touch    Right   Intact: light touch    Active Range of Motion   Left Knee   Flexion: 123 degrees   Extension: 0 degrees     Right Knee   Normal active range of motion    Strength/Myotome Testing     Left Hip   Planes of Motion   Extension: 4  Abduction: 4    Additional Strength Details  WFL exceptions as above    Functional Assessment        Single Leg Stance   Left: 10 seconds  Right: 30 seconds    Comments  SLS foam:  R: 19  L: 13    Single leg hop symmetry: 81.7%  Single hop distance   Left:   Trial 1: 90  Trial 2: 92   Trial 2: 100   Right:   Trial 1: 105   Trial 2: 118  Trial 3: 122       Flowsheet Rows      Flowsheet Row Most Recent Value   PT/OT G-Codes    Current Score 75   Projected Score 78               Diagnosis: s/p L TKA   Precautions: cancer   Primary  impairments: hip abduction/extension strength deficits, single leg balance deficits, and single leg hop asymmetry   *asterisks by exercise = given for HEP    12/23       Manuals                                                There Ex        Rec bike                                                                        Neuro Re-Ed        S/L hip abd        S/L hip circles        Prone hip ext        SLS foam        Sidestepping and waddle walks        Agility ladder        Single leg hops fwd/lat        Fwd/retro jog                                        Re-evaluation             Ther Act/Gait                                         Modalities

## 2024-12-30 ENCOUNTER — OFFICE VISIT (OUTPATIENT)
Dept: PHYSICAL THERAPY | Facility: REHABILITATION | Age: 64
End: 2024-12-30
Payer: MEDICARE

## 2024-12-30 DIAGNOSIS — Z96.652 HISTORY OF ARTHROPLASTY OF LEFT KNEE: ICD-10-CM

## 2024-12-30 DIAGNOSIS — M25.562 ACUTE PAIN OF LEFT KNEE: Primary | ICD-10-CM

## 2024-12-30 PROCEDURE — 97110 THERAPEUTIC EXERCISES: CPT | Performed by: PHYSICAL THERAPIST

## 2024-12-30 PROCEDURE — 97112 NEUROMUSCULAR REEDUCATION: CPT | Performed by: PHYSICAL THERAPIST

## 2024-12-30 NOTE — PROGRESS NOTES
"Daily Note     Today's date: 2024  Patient name: Wayne Mosley  : 1960  MRN: [id number]  Referring provider: Martinez Chen DO  Dx:   Encounter Diagnosis     ICD-10-CM    1. Acute pain of left knee  M25.562       2. History of arthroplasty of left knee  Z96.652           Start Time: 0730  Stop Time: 0812  Total time in clinic (min): 42 minutes    Subjective: Patient reports he felt fine after the examination. 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 Patient challenged maintaining stability with single leg stance on foam with ball toss bilaterally. Completed grapevine to simulate running requirement while refereeing. Updated HEP to include grapevine and sidelying hip abduction. Patient demonstrated fatigue post treatment, exhibited good technique with therapeutic exercises, and would benefit from continued PT      Plan: Continue per plan of care.        Diagnosis: s/p L TKA   Precautions: cancer   Primary impairments: hip abduction/extension strength deficits, single leg balance deficits, and single leg hop asymmetry   *asterisks by exercise = given for HEP          Manuals                                                There Ex        Rec bike   L2 x 8'                                                                      Neuro Re-Ed        S/L hip abd   5\" x 15      S/L hip circles        Prone hip ext   5\" x 15      SLS foam   15\" x 5      SLS foam ball toss   X 3 to fatigue      Sidestepping and waddle walks   Green x 2 laps      Agility ladder        Single leg hops fwd/lat   X 10 ea      [place] *   X 5 laps                                      Re-evaluation             Ther Act/Gait                                         Modalities                                                          "

## 2025-01-06 ENCOUNTER — OFFICE VISIT (OUTPATIENT)
Dept: PHYSICAL THERAPY | Facility: REHABILITATION | Age: 65
End: 2025-01-06
Payer: MEDICARE

## 2025-01-06 DIAGNOSIS — M25.562 ACUTE PAIN OF LEFT KNEE: Primary | ICD-10-CM

## 2025-01-06 DIAGNOSIS — Z96.652 HISTORY OF ARTHROPLASTY OF LEFT KNEE: ICD-10-CM

## 2025-01-06 PROCEDURE — 97112 NEUROMUSCULAR REEDUCATION: CPT | Performed by: PHYSICAL THERAPIST

## 2025-01-06 PROCEDURE — 97110 THERAPEUTIC EXERCISES: CPT | Performed by: PHYSICAL THERAPIST

## 2025-01-06 NOTE — PROGRESS NOTES
"Daily Note     Today's date: 2025  Patient name: Wayne Mosley  : 1960  MRN: [id number]  Referring provider: Danny Tay, PT  Dx:   Encounter Diagnosis     ICD-10-CM    1. Acute pain of left knee  M25.562       2. History of arthroplasty of left knee  Z96.652           Start Time: 1400  Stop Time: 1445  Total time in clinic (min): 45 minutes    Subjective: Patient reports he is having some soreness today related to dealing with the snow and painting his daughter's bathroom as he was on and off of the ladder. 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 Patient challenged maintaining stability during single leg stance on foam especially with ball toss. Initiated agility ladder with difficulty sequencing properly however efficiency improved with repetition. Updated HEP to include band sidestepping and waddle walks and provided green band for home use. Patient demonstrated fatigue post treatment, exhibited good technique with therapeutic exercises, and would benefit from continued PT      Plan: Continue per plan of care.        Diagnosis: s/p L TKA   Precautions: cancer   Primary impairments: hip abduction/extension strength deficits, single leg balance deficits, and single leg hop asymmetry   *asterisks by exercise = given for HEP         Manuals                                                There Ex        Rec bike   L2 x 8'  L2 x 8'                                                                     Neuro Re-Ed        S/L hip abd   5\" x 15  5\" x 15     S/L hip circles        Prone hip ext   5\" x 15  5\" x 15     SLS foam   15\" x 5  15\" x 5     SLS foam ball toss   X 3 to fatigue  X 3 to fatigue     Sidestepping and waddle walks *   Green x 2 laps  Green x 2 laps     Agility ladder    10'     Single leg hops fwd/lat   X 10 ea  X 10 ea     [place] *   X 5 laps  HEP                                     Re-evaluation  "            Ther Act/Gait                                         Modalities

## 2025-01-13 ENCOUNTER — OFFICE VISIT (OUTPATIENT)
Dept: PHYSICAL THERAPY | Facility: REHABILITATION | Age: 65
End: 2025-01-13
Payer: MEDICARE

## 2025-01-13 DIAGNOSIS — M25.562 ACUTE PAIN OF LEFT KNEE: Primary | ICD-10-CM

## 2025-01-13 DIAGNOSIS — Z96.652 HISTORY OF ARTHROPLASTY OF LEFT KNEE: ICD-10-CM

## 2025-01-13 PROCEDURE — 97110 THERAPEUTIC EXERCISES: CPT | Performed by: PHYSICAL THERAPIST

## 2025-01-13 PROCEDURE — 97112 NEUROMUSCULAR REEDUCATION: CPT | Performed by: PHYSICAL THERAPIST

## 2025-01-13 NOTE — PROGRESS NOTES
"Daily Note     Today's date: 2025  Patient name: Wayne Mosley  : 1960  MRN: [id number]  Referring provider: Danny Tay, PT  Dx:   Encounter Diagnosis     ICD-10-CM    1. Acute pain of left knee  M25.562       2. 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 Cueing to appropriately sequence various patterns during agility ladder. Updated HEP to include single leg stance on foam with option to include ball toss. Patient demonstrated fatigue post treatment, exhibited good technique with therapeutic exercises, and would benefit from continued PT      Plan: Continue per plan of care.        Diagnosis: s/p L TKA   Precautions: cancer   Primary impairments: hip abduction/extension strength deficits, single leg balance deficits, and single leg hop asymmetry   *asterisks by exercise = given for HEP        Manuals                                                There Ex        Rec bike   L2 x 8'  L2 x 8'  L2 x 8'                                                                    Neuro Re-Ed        S/L hip abd   5\" x 15  5\" x 15     S/L hip circles     X 15 cw/ccw    Prone hip ext   5\" x 15  5\" x 15  5\" x 15    SLS foam *   15\" x 5  15\" x 5  5\" x 10    SLS foam ball toss   X 3 to fatigue  X 3 to fatigue  X 3 to fatigue    Sidestepping and waddle walks *   Green x 2 laps  Green x 2 laps  HEP    Agility ladder    10'  10'    Single leg hops fwd/lat   X 10 ea  X 10 ea  X 10 ea    [place] *   X 5 laps  HEP     Opening up drill     Completed                             Re-evaluation             Ther Act/Gait                   "                       Modalities

## 2025-01-20 ENCOUNTER — OFFICE VISIT (OUTPATIENT)
Dept: PHYSICAL THERAPY | Facility: REHABILITATION | Age: 65
End: 2025-01-20
Payer: MEDICARE

## 2025-01-20 DIAGNOSIS — M25.562 ACUTE PAIN OF LEFT KNEE: Primary | ICD-10-CM

## 2025-01-20 DIAGNOSIS — Z96.652 HISTORY OF ARTHROPLASTY OF LEFT KNEE: ICD-10-CM

## 2025-01-20 PROCEDURE — 97110 THERAPEUTIC EXERCISES: CPT | Performed by: PHYSICAL THERAPIST

## 2025-01-20 PROCEDURE — 97112 NEUROMUSCULAR REEDUCATION: CPT | Performed by: PHYSICAL THERAPIST

## 2025-01-20 NOTE — PROGRESS NOTES
"Daily Note     Today's date: 2025  Patient name: Wayne Mosley  : 1960  MRN: [id number]  Referring provider: Danny Tay, PT  Dx:   Encounter Diagnosis     ICD-10-CM    1. Acute pain of left knee  M25.562       2. History of arthroplasty of left knee  Z96.652           Start Time: 0900  Stop Time: 0940  Total time in clinic (min): 40 minutes    Subjective: Patient reports his knee is gradually improving as is his tolerance to refereeing      Objective: See treatment diary below      Assessment: Tolerated treatment well. Initiated Bosu squats with occasional fingertip support required to maintain balance. Patient demonstrates improving efficiency with agility ladder compared to past visits. Initiated unilateral leg press. Patient demonstrated fatigue post treatment, exhibited good technique with therapeutic exercises, and would benefit from continued PT      Plan: Continue per plan of care.        Diagnosis: s/p L TKA   Precautions: cancer   Primary impairments: hip abduction/extension strength deficits, single leg balance deficits, and single leg hop asymmetry   *asterisks by exercise = given for HEP       Manuals                                                There Ex        Rec bike  L2 x 8'  L2 x 8'  L2 x 8'  L2 x 8'  L2 x 8'                                                                   Neuro Re-Ed        S/L hip abd   5\" x 15  5\" x 15     S/L hip circles     X 15 cw/ccw    Prone hip ext   5\" x 15  5\" x 15  5\" x 15    SLS foam *  HEP  15\" x 5  15\" x 5  5\" x 10    SLS foam ball toss  X 3 to fatigue  X 3 to fatigue  X 3 to fatigue  X 3 to fatigue    Sidestepping and waddle walks *   Green x 2 laps  Green x 2 laps  HEP    Agility ladder  10'   10'  10'    Single leg hops fwd/lat  X 10 ea  X 10 ea  X 10 ea  X 10 ea    [place] *   X 5 laps  HEP     Opening up drill  D/C    Completed     U/L leg press  90 lbs x 30       Squats on Bosu  X 30                             "   Re-evaluation            Ther Act/Gait                                      Modalities

## 2025-01-22 ENCOUNTER — HOSPITAL ENCOUNTER (EMERGENCY)
Facility: HOSPITAL | Age: 65
Discharge: HOME | End: 2025-01-22
Attending: EMERGENCY MEDICINE
Payer: MEDICARE

## 2025-01-22 ENCOUNTER — APPOINTMENT (EMERGENCY)
Dept: RADIOLOGY | Facility: HOSPITAL | Age: 65
End: 2025-01-22
Payer: MEDICARE

## 2025-01-22 VITALS
HEART RATE: 62 BPM | DIASTOLIC BLOOD PRESSURE: 74 MMHG | TEMPERATURE: 98.2 F | BODY MASS INDEX: 22.62 KG/M2 | RESPIRATION RATE: 20 BRPM | SYSTOLIC BLOOD PRESSURE: 138 MMHG | WEIGHT: 158 LBS | OXYGEN SATURATION: 96 % | HEIGHT: 70 IN

## 2025-01-22 DIAGNOSIS — N20.1 URETEROLITHIASIS: Primary | ICD-10-CM

## 2025-01-22 LAB
ALBUMIN SERPL BCG-MCNC: 4.4 G/DL (ref 3.5–5)
ALP SERPL-CCNC: 37 U/L (ref 34–104)
ALT SERPL W P-5'-P-CCNC: 41 U/L (ref 7–52)
ANION GAP SERPL CALCULATED.3IONS-SCNC: 8 MMOL/L (ref 4–13)
AST SERPL W P-5'-P-CCNC: 33 U/L (ref 13–39)
BACTERIA UR QL AUTO: ABNORMAL /HPF
BASOPHILS # BLD AUTO: 0.03 THOUSANDS/ΜL (ref 0–0.1)
BASOPHILS NFR BLD AUTO: 1 % (ref 0–1)
BILIRUB SERPL-MCNC: 0.92 MG/DL (ref 0.2–1)
BILIRUB UR QL STRIP: NEGATIVE
BUN SERPL-MCNC: 25 MG/DL (ref 5–25)
CALCIUM SERPL-MCNC: 9.5 MG/DL (ref 8.4–10.2)
CHLORIDE SERPL-SCNC: 108 MMOL/L (ref 96–108)
CLARITY UR: CLEAR
CO2 SERPL-SCNC: 24 MMOL/L (ref 21–32)
COLOR UR: YELLOW
CREAT SERPL-MCNC: 1.08 MG/DL (ref 0.6–1.3)
EOSINOPHIL # BLD AUTO: 0.08 THOUSAND/ΜL (ref 0–0.61)
EOSINOPHIL NFR BLD AUTO: 2 % (ref 0–6)
ERYTHROCYTE [DISTWIDTH] IN BLOOD BY AUTOMATED COUNT: 13.2 % (ref 11.6–15.1)
GFR SERPL CREATININE-BSD FRML MDRD: 71 ML/MIN/1.73SQ M
GLUCOSE SERPL-MCNC: 112 MG/DL (ref 65–140)
GLUCOSE UR STRIP-MCNC: NEGATIVE MG/DL
HCT VFR BLD AUTO: 43.3 % (ref 36.5–49.3)
HGB BLD-MCNC: 14.3 G/DL (ref 12–17)
HGB UR QL STRIP.AUTO: ABNORMAL
HYALINE CASTS #/AREA URNS LPF: ABNORMAL /LPF
IMM GRANULOCYTES # BLD AUTO: 0.01 THOUSAND/UL (ref 0–0.2)
IMM GRANULOCYTES NFR BLD AUTO: 0 % (ref 0–2)
KETONES UR STRIP-MCNC: NEGATIVE MG/DL
LEUKOCYTE ESTERASE UR QL STRIP: NEGATIVE
LYMPHOCYTES # BLD AUTO: 0.48 THOUSANDS/ΜL (ref 0.6–4.47)
LYMPHOCYTES NFR BLD AUTO: 12 % (ref 14–44)
MCH RBC QN AUTO: 30.2 PG (ref 26.8–34.3)
MCHC RBC AUTO-ENTMCNC: 33 G/DL (ref 31.4–37.4)
MCV RBC AUTO: 91 FL (ref 82–98)
MONOCYTES # BLD AUTO: 0.42 THOUSAND/ΜL (ref 0.17–1.22)
MONOCYTES NFR BLD AUTO: 10 % (ref 4–12)
MUCOUS THREADS UR QL AUTO: ABNORMAL
NEUTROPHILS # BLD AUTO: 3.07 THOUSANDS/ΜL (ref 1.85–7.62)
NEUTS SEG NFR BLD AUTO: 75 % (ref 43–75)
NITRITE UR QL STRIP: NEGATIVE
NON-SQ EPI CELLS URNS QL MICRO: ABNORMAL /HPF
NRBC BLD AUTO-RTO: 0 /100 WBCS
PH UR STRIP.AUTO: 6 [PH]
PLATELET # BLD AUTO: 213 THOUSANDS/UL (ref 149–390)
PMV BLD AUTO: 9.5 FL (ref 8.9–12.7)
POTASSIUM SERPL-SCNC: 4.3 MMOL/L (ref 3.5–5.3)
PROT SERPL-MCNC: 7.7 G/DL (ref 6.4–8.4)
PROT UR STRIP-MCNC: ABNORMAL MG/DL
RBC # BLD AUTO: 4.74 MILLION/UL (ref 3.88–5.62)
RBC #/AREA URNS AUTO: ABNORMAL /HPF
SODIUM SERPL-SCNC: 140 MMOL/L (ref 135–147)
SP GR UR STRIP.AUTO: 1.02 (ref 1–1.03)
UROBILINOGEN UR STRIP-ACNC: <2 MG/DL
WBC # BLD AUTO: 4.09 THOUSAND/UL (ref 4.31–10.16)
WBC #/AREA URNS AUTO: ABNORMAL /HPF

## 2025-01-22 PROCEDURE — 81001 URINALYSIS AUTO W/SCOPE: CPT

## 2025-01-22 PROCEDURE — 99284 EMERGENCY DEPT VISIT MOD MDM: CPT

## 2025-01-22 PROCEDURE — 85025 COMPLETE CBC W/AUTO DIFF WBC: CPT

## 2025-01-22 PROCEDURE — 99285 EMERGENCY DEPT VISIT HI MDM: CPT | Performed by: EMERGENCY MEDICINE

## 2025-01-22 PROCEDURE — 76775 US EXAM ABDO BACK WALL LIM: CPT | Performed by: EMERGENCY MEDICINE

## 2025-01-22 PROCEDURE — 74177 CT ABD & PELVIS W/CONTRAST: CPT

## 2025-01-22 PROCEDURE — 36415 COLL VENOUS BLD VENIPUNCTURE: CPT

## 2025-01-22 PROCEDURE — 96374 THER/PROPH/DIAG INJ IV PUSH: CPT

## 2025-01-22 PROCEDURE — 80053 COMPREHEN METABOLIC PANEL: CPT

## 2025-01-22 PROCEDURE — 96376 TX/PRO/DX INJ SAME DRUG ADON: CPT

## 2025-01-22 RX ORDER — HYDROMORPHONE HCL IN WATER/PF 6 MG/30 ML
0.2 PATIENT CONTROLLED ANALGESIA SYRINGE INTRAVENOUS ONCE
Refills: 0 | Status: COMPLETED | OUTPATIENT
Start: 2025-01-22 | End: 2025-01-22

## 2025-01-22 RX ORDER — OXYCODONE HYDROCHLORIDE 5 MG/1
5 TABLET ORAL EVERY 4 HOURS PRN
Qty: 12 TABLET | Refills: 0 | Status: SHIPPED | OUTPATIENT
Start: 2025-01-22

## 2025-01-22 RX ORDER — OXYCODONE HYDROCHLORIDE 5 MG/1
5 TABLET ORAL EVERY 4 HOURS PRN
Qty: 12 TABLET | Refills: 0 | Status: SHIPPED | OUTPATIENT
Start: 2025-01-22 | End: 2025-01-22

## 2025-01-22 RX ORDER — ACETAMINOPHEN 325 MG/1
650 TABLET ORAL ONCE
Status: COMPLETED | OUTPATIENT
Start: 2025-01-22 | End: 2025-01-22

## 2025-01-22 RX ORDER — TAMSULOSIN HYDROCHLORIDE 0.4 MG/1
0.4 CAPSULE ORAL
Qty: 30 CAPSULE | Refills: 0 | Status: SHIPPED | OUTPATIENT
Start: 2025-01-22

## 2025-01-22 RX ADMIN — ACETAMINOPHEN 650 MG: 325 TABLET, FILM COATED ORAL at 13:24

## 2025-01-22 RX ADMIN — IOHEXOL 100 ML: 350 INJECTION, SOLUTION INTRAVENOUS at 15:04

## 2025-01-22 RX ADMIN — HYDROMORPHONE HYDROCHLORIDE 0.2 MG: 0.2 INJECTION, SOLUTION INTRAMUSCULAR; INTRAVENOUS; SUBCUTANEOUS at 13:24

## 2025-01-22 RX ADMIN — HYDROMORPHONE HYDROCHLORIDE 0.2 MG: 0.2 INJECTION, SOLUTION INTRAMUSCULAR; INTRAVENOUS; SUBCUTANEOUS at 15:20

## 2025-01-22 NOTE — Clinical Note
Wayne Mosley was seen and treated in our emergency department on 1/22/2025.                Diagnosis: Kidney stone    Wayne  .    He may return on this date: 01/23/2025         If you have any questions or concerns, please don't hesitate to call.      Flori Fountain MD    ______________________________           _______________          _______________  Hospital Representative                              Date                                Time

## 2025-01-22 NOTE — ED PROVIDER NOTES
Time reflects when diagnosis was documented in both MDM as applicable and the Disposition within this note       Time User Action Codes Description Comment    1/22/2025  3:59 PM EssieFlori woods Add [N20.1] Ureterolithiasis           ED Disposition       ED Disposition   Discharge    Condition   Stable    Date/Time   Wed Jan 22, 2025  4:17 PM    Comment   Wayne Mosley discharge to home/self care.                   Assessment & Plan       Medical Decision Making  Amount and/or Complexity of Data Reviewed  Labs:  Decision-making details documented in ED Course.  Radiology: ordered. Decision-making details documented in ED Course.    Risk  OTC drugs.  Prescription drug management.      Patient is a 64 y.o. male  PMH BPH who presents to the ED with CC acute L sided flank pain and hematuria x1 day. Has hx of nephrolithiasis requiring intervention as well as radiation for prostate cancer ending this past September. Had flank pain last night which improved with tylneol and motrin but woke up with worsening pain. Had one episode of frankly bloody urination which prompted him to come in. The pain is crampy in nature and sometimes sharp. He is having regular bowel movements. No HA, SOB, dizziness, or CP.     Exam: normal belly; R CVA tenderness     Vital signs stable, afebrile. Exam as listed below.    Differential diagnosis includes but is not limited to ureterolithiasis, UTI, pyelonephritis, hemorrhagic cystitis, cannot rule out malignancy given history of cancer    Plan CBC, CMP, UA, CT abdomen and pelvis with IV contrast, ultrasound bladder to look for blood products.  Multimodal analgesia    View ED course above for further discussion on patient workup.     All labs reviewed and utilized in the medical decision making process  All radiology studies independently viewed by me and interpreted by the radiologist.  I reviewed all testing with the patient.     Upon re-evaluation UA without evidence of UTI.  CT shows a right  "ureteral lithiasis with some hydroureteronephrosis.  Ultrasound of bladder performed by colleague after patient had emptied his bladder.  There is full bladder emptying with inability to fully visualize the bladder indicating no obstruction from blood clots or retained blood products.  Patient discharged home with referral to urology.  Return precautions discussed for fever, abdominal pain, nausea, vomiting, inability to void.    ED Course as of 01/23/25 1726   Wed Jan 22, 2025   1222 Blood, UA(!): Large   1222 UA w Reflex to Microscopic w Reflex to Culture(!)  Negative for UTI   1222 Comprehensive metabolic panel  All WNL   1222 WBC(!): 4.09   1222 Hemoglobin: 14.3   1559 CT abdomen pelvis with contrast  \"Right distal ureteral calculus, 3 mm resulting in right-sided hydroureteronephrosis and delayed right renal nephrogram and accounting for the patient's acute symptoms.     Postsurgical changes of prostatectomy. No findings to suggest complication of treatment. No CT evidence to suggest recurrent or metastatic tumor in the abdomen or pelvis.     Sliding-type hiatal hernia.  \"       Medications   acetaminophen (TYLENOL) tablet 650 mg (650 mg Oral Given 1/22/25 1324)   HYDROmorphone HCl (DILAUDID) injection 0.2 mg (0.2 mg Intravenous Given 1/22/25 1324)   iohexol (OMNIPAQUE) 350 MG/ML injection (MULTI-DOSE) 100 mL (100 mL Intravenous Given 1/22/25 1504)   HYDROmorphone HCl (DILAUDID) injection 0.2 mg (0.2 mg Intravenous Given 1/22/25 1520)       ED Risk Strat Scores                          SBIRT 22yo+      Flowsheet Row Most Recent Value   Initial Alcohol Screen: US AUDIT-C     1. How often do you have a drink containing alcohol? 2 Filed at: 01/22/2025 1112   2. How many drinks containing alcohol do you have on a typical day you are drinking?  1 Filed at: 01/22/2025 1112   3a. Male UNDER 65: How often do you have five or more drinks on one occasion? 0 Filed at: 01/22/2025 1112   Audit-C Score 3 Filed at: 01/22/2025 " 1112   JAYME: How many times in the past year have you...    Used an illegal drug or used a prescription medication for non-medical reasons? Never Filed at: 01/22/2025 1112                            History of Present Illness       Chief Complaint   Patient presents with    Blood in Urine     Pt having blood in urine and right flank this am. Hx of kidney stones       Past Medical History:   Diagnosis Date    Abnormal blood chemistry     Allergic     Allergic rhinitis     last assessed 08/22/2014    Anxiety     Cancer (HCC)     Elevated PSA     Erectile dysfunction of non-organic origin     Hyperglycemia     Last Assessed: 1/20/2016     Hyperlipidemia     Kidney stone     Paresthesia of foot     last assessed 08/14/2015      Past Surgical History:   Procedure Laterality Date    COLONOSCOPY      FOOT SURGERY      HAND SURGERY Left     HERNIA REPAIR      INGUINAL HERNIA REPAIR      KNEE SURGERY      MN COLONOSCOPY FLX DX W/COLLJ SPEC WHEN PFRMD N/A 01/10/2017    Procedure: COLONOSCOPY;  Surgeon: Car Barbour MD;  Location: [facility] GI [facility];  Service: Gastroenterology    MN CYSTO/URETERO W/LITHOTRIPSY &INDWELL STENT INSRT Left 02/01/2018    Procedure: CYSTOSCOPY, LEFT URETEROSCOPY, RETROGRADE PYELOGRAM;  Surgeon: James Whaley MD;  Location: [facility];  Service: Urology    PROSTATE BIOPSY  12/27/2019    PROSTATE SURGERY        Family History   Problem Relation Age of Onset    Lung cancer Mother     Cancer Mother     Lung cancer Father     Cancer Father       Social History     Tobacco Use    Smoking status: Never    Smokeless tobacco: Never   Vaping Use    Vaping status: Never Used   Substance Use Topics    Alcohol use: Yes     Comment: occasional/social alcohol use     Drug use: No      E-Cigarette/Vaping    E-Cigarette Use Never User       E-Cigarette/Vaping Substances    Nicotine No     THC No     CBD No     Flavoring No     Other No     Unknown No       I have reviewed and agree with the history as documented.      64 y.o. male with CC hematuria and flank pain        Review of Systems   Constitutional:  Negative for activity change, appetite change, chills and fever.   Gastrointestinal:  Positive for abdominal pain. Negative for abdominal distention, nausea and vomiting.   Genitourinary:  Positive for flank pain and hematuria.   Skin:  Negative for color change and pallor.   Neurological:  Negative for dizziness.           Objective       ED Triage Vitals   Temperature Pulse Blood Pressure Respirations SpO2 Patient Position - Orthostatic VS   01/22/25 1616 01/22/25 1110 01/22/25 1110 01/22/25 1110 01/22/25 1110 01/22/25 1502   98.2 °F (36.8 °C) 64 134/96 18 98 % Sitting      Temp Source Heart Rate Source BP Location FiO2 (%) Pain Score    01/22/25 1616 01/22/25 1502 01/22/25 1110 -- 01/22/25 1110    Oral Monitor Left arm  5      Vitals      Date and Time Temp Pulse SpO2 Resp BP Pain Score FACES Pain Rating User   01/22/25 1616 98.2 °F (36.8 °C) -- -- -- -- -- -- NV   01/22/25 1520 -- -- -- -- -- 8 -- [facility]   01/22/25 1502 -- 62 96 % 20 138/74 8 -- [facility]   01/22/25 1324 -- -- -- -- -- 10 - Worst Possible Pain -- [facility]   01/22/25 1110 -- 64 98 % 18 134/96 5 -- [facility]            Physical Exam  Constitutional:       Appearance: Normal appearance.   HENT:      Head: Normocephalic and atraumatic.      Nose: Nose normal.   Eyes:      Pupils: Pupils are equal, round, and reactive to light.   Cardiovascular:      Rate and Rhythm: Normal rate and regular rhythm.   Pulmonary:      Effort: Pulmonary effort is normal. No respiratory distress.      Breath sounds: No wheezing.   Abdominal:      General: Abdomen is flat. There is no distension.      Palpations: Abdomen is soft.      Tenderness: There is no abdominal tenderness. There is right CVA tenderness. There is no left CVA tenderness or guarding.   Skin:     General: Skin is warm and dry.      Capillary Refill: Capillary refill takes less than 2 seconds.   Neurological:      General: No focal  deficit present.      Mental Status: He is alert and oriented to person, place, and time.         Results Reviewed       Procedure Component Value Units Date/Time    Urine Microscopic [[id number]]  (Abnormal) Collected: 01/22/25 1126    Lab Status: Final result Specimen: Urine, Clean Catch Updated: 01/22/25 1223     RBC, UA 30-50 /hpf      WBC, UA None Seen /hpf      Epithelial Cells None Seen /hpf      Bacteria, UA None Seen /hpf      MUCUS THREADS Occasional     Hyaline Casts, UA 0-3 /lpf     UA w Reflex to Microscopic w Reflex to Culture [[id number]]  (Abnormal) Collected: 01/22/25 1126    Lab Status: Final result Specimen: Urine, Clean Catch Updated: 01/22/25 1149     Color, UA Yellow     Clarity, UA Clear     Specific Gravity, UA 1.022     pH, UA 6.0     Leukocytes, UA Negative     Nitrite, UA Negative     Protein, UA Trace mg/dl      Glucose, UA Negative mg/dl      Ketones, UA Negative mg/dl      Urobilinogen, UA <2.0 mg/dl      Bilirubin, UA Negative     Occult Blood, UA Large    Comprehensive metabolic panel [[id number]] Collected: 01/22/25 1115    Lab Status: Final result Specimen: Blood from Arm, Left Updated: 01/22/25 1146     Sodium 140 mmol/L      Potassium 4.3 mmol/L      Chloride 108 mmol/L      CO2 24 mmol/L      ANION GAP 8 mmol/L      BUN 25 mg/dL      Creatinine 1.08 mg/dL      Glucose 112 mg/dL      Calcium 9.5 mg/dL      AST 33 U/L      ALT 41 U/L      Alkaline Phosphatase 37 U/L      Total Protein 7.7 g/dL      Albumin 4.4 g/dL      Total Bilirubin 0.92 mg/dL      eGFR 71 ml/min/1.73sq m     Narrative:      National Kidney Disease Foundation guidelines for Chronic Kidney Disease (CKD):     Stage 1 with normal or high GFR (GFR > 90 mL/min/1.73 square meters)    Stage 2 Mild CKD (GFR = 60-89 mL/min/1.73 square meters)    Stage 3A Moderate CKD (GFR = 45-59 mL/min/1.73 square meters)    Stage 3B Moderate CKD (GFR = 30-44 mL/min/1.73 square meters)    Stage 4 Severe CKD (GFR = 15-29 mL/min/1.73 square  "meters)    Stage 5 End Stage CKD (GFR <15 mL/min/1.73 square meters)  Note: GFR calculation is accurate only with a steady state creatinine    CBC and differential [[id number]]  (Abnormal) Collected: 01/22/25 1115    Lab Status: Final result Specimen: Blood from Arm, Left Updated: 01/22/25 1125     WBC 4.09 Thousand/uL      RBC 4.74 Million/uL      Hemoglobin 14.3 g/dL      Hematocrit 43.3 %      MCV 91 fL      MCH 30.2 pg      MCHC 33.0 g/dL      RDW 13.2 %      MPV 9.5 fL      Platelets 213 Thousands/uL      nRBC 0 /100 WBCs      Segmented % 75 %      Immature Grans % 0 %      Lymphocytes % 12 %      Monocytes % 10 %      Eosinophils Relative 2 %      Basophils Relative 1 %      Absolute Neutrophils 3.07 Thousands/µL      Absolute Immature Grans 0.01 Thousand/uL      Absolute Lymphocytes 0.48 Thousands/µL      Absolute Monocytes 0.42 Thousand/µL      Eosinophils Absolute 0.08 Thousand/µL      Basophils Absolute 0.03 Thousands/µL             CT abdomen pelvis with contrast   Final Interpretation by Angelito Santo MD (01/22 0584)      Right distal ureteral calculus, 3 mm resulting in right-sided hydroureteronephrosis and delayed right renal nephrogram and accounting for the patient's acute symptoms.      Postsurgical changes of prostatectomy. No findings to suggest complication of treatment. No CT evidence to suggest recurrent or metastatic tumor in the abdomen or pelvis.      Sliding-type hiatal hernia.      This examination was marked \"immediate notification\" in Epic in order to begin the standard process by which the radiology reading room liaison alerts the referring practitioner.            Workstation performed: [id number]             Procedures    ED Medication and Procedure Management   Prior to Admission Medications   Prescriptions Last Dose Informant Patient Reported? Taking?   DAILY MULTIPLE VITAMINS/IRON PO  Self Yes No   Sig: Take 1 tablet by mouth daily   ascorbic acid (VITAMIN C) 500 mg tablet  " Self Yes No   Sig: Take 1,000 mg by mouth daily   ezetimibe (ZETIA) 10 mg tablet   No No   Sig: Take 1 tablet (10 mg total) by mouth daily   rosuvastatin (CRESTOR) 20 MG tablet   No No   Sig: TAKE 1 TABLET BY MOUTH EVERY DAY   tadalafil (CIALIS) 5 MG tablet  Self No No   Si tab daily      Facility-Administered Medications: None     Discharge Medication List as of 2025  4:25 PM        START taking these medications    Details   tamsulosin (FLOMAX) 0.4 mg Take 1 capsule (0.4 mg total) by mouth daily with dinner, Starting 2025, Normal      oxyCODONE (Roxicodone) 5 immediate release tablet Take 1 tablet (5 mg total) by mouth every 4 (four) hours as needed for moderate pain for up to 12 doses Max Daily Amount: 30 mg, Starting 2025, Normal           CONTINUE these medications which have NOT CHANGED    Details   ascorbic acid (VITAMIN C) 500 mg tablet Take 1,000 mg by mouth daily, Historical Med      DAILY MULTIPLE VITAMINS/IRON PO Take 1 tablet by mouth daily, Historical Med      ezetimibe (ZETIA) 10 mg tablet Take 1 tablet (10 mg total) by mouth daily, Starting Thu 10/17/2024, Until Sun 10/12/2025, Normal      rosuvastatin (CRESTOR) 20 MG tablet TAKE 1 TABLET BY MOUTH EVERY DAY, Starting 2024, Normal      tadalafil (CIALIS) 5 MG tablet 1 tab daily, No Print             ED SEPSIS DOCUMENTATION   Time reflects when diagnosis was documented in both MDM as applicable and the Disposition within this note       Time User Action Codes Description Comment    2025  3:59 PM Flori Fountain Add [N20.1] Ureterolithiasis                  Flori Fountain MD  25 1812

## 2025-01-22 NOTE — DISCHARGE INSTRUCTIONS
You were seen for a kidney stone. You have referral to Urology for follow up. Please strain your urine to make sure you pass the stone. If you have worsening pain, inability to urinate, or fevers, return to the ER.

## 2025-01-22 NOTE — ED PROCEDURE NOTE
Procedure  POC Renal US    Date/Time: 1/22/2025 12:57 PM    Performed by: Echo Martinez DO  Authorized by: Echo Martinez DO    Patient location:  ED  Performed by:  Resident  Procedure details:     Exam Type:  Diagnostic    Indications: flank/back pain      Assessment for:  Bladder volume and suspected hydronephrosis    Views obtained: bladder (transverse and sagittal), left kidney and right kidney      Image quality: diagnostic      Image availability:  Images available in PACS  Findings:     LEFT kidney findings: unremarkable      LEFT hydronephrosis: none      RIGHT kidney findings: unremarkable      RIGHT hydronephrosis: none      Bladder:  Not visualized  Interpretation:     Renal ultrasound impressions: normal exam                     Echo Martinez DO  01/22/25 1257

## 2025-01-22 NOTE — ED ATTENDING ATTESTATION
1/22/2025  I, Marisabel Chino MD, saw and evaluated the patient. I have discussed the patient with the resident/non-physician practitioner and agree with the resident's/non-physician practitioner's findings, Plan of Care, and MDM as documented in the resident's/non-physician practitioner's note, except where noted. All available labs and Radiology studies were reviewed.  I was present for key portions of any procedure(s) performed by the resident/non-physician practitioner and I was immediately available to provide assistance.       At this point I agree with the current assessment done in the Emergency Department.  I have conducted an independent evaluation of this patient a history and physical is as follows:  This is a 64-year-old male here with left-sided flank pain and hematuria.  Patient has history of nephrolithiasis.  He also has history of prostate cancer, and completed radiation this past September.  The patient states that he started with intermittent colicky left flank pain, and had an episode of significant hematuria.  Since that time, he has not seen significant blood in his urine.  He is not having a difficult time voiding.  He does not feel like he is having incomplete emptying.  He has not been having fevers or chills.  He is not vomiting.  His review of systems otherwise negative in 12 systems reviewed.  On exam the patient is well-appearing.  His HEENT exam is unremarkable.  He has good color and tone.  His neck is supple and nontender with no adenopathy.  His heart is regular without murmurs, rubs, or gallops.  His lungs are clear with good air movement.  His abdomen is soft and nontender.  There are no masses, rebound, guarding.  He is mild CVA tenderness on the left. MEDICAL DECISION MAKING    Number and Complexity of Problems  Differential diagnosis: Kidney stone, doubt pyelonephritis, doubt intra-abdominal surgical pathology, also concern for urinary obstruction secondary to  "hematuria    Medical Decision Making Data  External documents reviewed:   My EKG interpretation:   My CT interpretation:   My X-ray interpretation:   My ultrasound interpretation: Bedside ultrasound with empty bladder    CT abdomen pelvis with contrast   Final Result      Right distal ureteral calculus, 3 mm resulting in right-sided hydroureteronephrosis and delayed right renal nephrogram and accounting for the patient's acute symptoms.      Postsurgical changes of prostatectomy. No findings to suggest complication of treatment. No CT evidence to suggest recurrent or metastatic tumor in the abdomen or pelvis.      Sliding-type hiatal hernia.      This examination was marked \"immediate notification\" in Epic in order to begin the standard process by which the radiology reading room liaison alerts the referring practitioner.            Workstation performed: [id number]             Labs Reviewed   CBC AND DIFFERENTIAL - Abnormal       Result Value Ref Range Status    WBC 4.09 (*) 4.31 - 10.16 Thousand/uL Final    RBC 4.74  3.88 - 5.62 Million/uL Final    Hemoglobin 14.3  12.0 - 17.0 g/dL Final    Hematocrit 43.3  36.5 - 49.3 % Final    MCV 91  82 - 98 fL Final    MCH 30.2  26.8 - 34.3 pg Final    MCHC 33.0  31.4 - 37.4 g/dL Final    RDW 13.2  11.6 - 15.1 % Final    MPV 9.5  8.9 - 12.7 fL Final    Platelets 213  149 - 390 Thousands/uL Final    nRBC 0  /100 WBCs Final    Segmented % 75  43 - 75 % Final    Immature Grans % 0  0 - 2 % Final    Lymphocytes % 12 (*) 14 - 44 % Final    Monocytes % 10  4 - 12 % Final    Eosinophils Relative 2  0 - 6 % Final    Basophils Relative 1  0 - 1 % Final    Absolute Neutrophils 3.07  1.85 - 7.62 Thousands/µL Final    Absolute Immature Grans 0.01  0.00 - 0.20 Thousand/uL Final    Absolute Lymphocytes 0.48 (*) 0.60 - 4.47 Thousands/µL Final    Absolute Monocytes 0.42  0.17 - 1.22 Thousand/µL Final    Eosinophils Absolute 0.08  0.00 - 0.61 Thousand/µL Final    Basophils Absolute 0.03  0.00 " - 0.10 Thousands/µL Final   UA W REFLEX TO MICROSCOPIC WITH REFLEX TO CULTURE - Abnormal    Color, UA Yellow   Final    Clarity, UA Clear   Final    Specific Gravity, UA 1.022  1.003 - 1.030 Final    pH, UA 6.0  4.5, 5.0, 5.5, 6.0, 6.5, 7.0, 7.5, 8.0 Final    Leukocytes, UA Negative  Negative Final    Nitrite, UA Negative  Negative Final    Protein, UA Trace (*) Negative mg/dl Final    Glucose, UA Negative  Negative mg/dl Final    Ketones, UA Negative  Negative mg/dl Final    Urobilinogen, UA <2.0  <2.0 mg/dl mg/dl Final    Bilirubin, UA Negative  Negative Final    Occult Blood, UA Large (*) Negative Final   URINE MICROSCOPIC - Abnormal    RBC, UA 30-50 (*) None Seen, 1-2 /hpf Final    WBC, UA None Seen  None Seen, 1-2 /hpf Final    Epithelial Cells None Seen  None Seen, Occasional /hpf Final    Bacteria, UA None Seen  None Seen, Occasional /hpf Final    MUCUS THREADS Occasional (*) None Seen Final    Hyaline Casts, UA 0-3 (*) None Seen /lpf Final   COMPREHENSIVE METABOLIC PANEL    Sodium 140  135 - 147 mmol/L Final    Potassium 4.3  3.5 - 5.3 mmol/L Final    Chloride 108  96 - 108 mmol/L Final    CO2 24  21 - 32 mmol/L Final    ANION GAP 8  4 - 13 mmol/L Final    BUN 25  5 - 25 mg/dL Final    Creatinine 1.08  0.60 - 1.30 mg/dL Final    Comment: Standardized to IDMS reference method    Glucose 112  65 - 140 mg/dL Final    Comment: If the patient is fasting, the ADA then defines impaired fasting glucose as > 100 mg/dL and diabetes as > or equal to 123 mg/dL.    Calcium 9.5  8.4 - 10.2 mg/dL Final    AST 33  13 - 39 U/L Final    ALT 41  7 - 52 U/L Final    Comment: Specimen collection should occur prior to Sulfasalazine administration due to the potential for falsely depressed results.     Alkaline Phosphatase 37  34 - 104 U/L Final    Total Protein 7.7  6.4 - 8.4 g/dL Final    Albumin 4.4  3.5 - 5.0 g/dL Final    Total Bilirubin 0.92  0.20 - 1.00 mg/dL Final    Comment: Use of this assay is not recommended for  patients undergoing treatment with eltrombopag due to the potential for falsely elevated results.  N-acetyl-p-benzoquinone imine (metabolite of Acetaminophen) will generate erroneously low results in samples for patients that have taken an overdose of Acetaminophen.    eGFR 71  ml/min/1.73sq m Final    Narrative:     National Kidney Disease Foundation guidelines for Chronic Kidney Disease (CKD):     Stage 1 with normal or high GFR (GFR > 90 mL/min/1.73 square meters)    Stage 2 Mild CKD (GFR = 60-89 mL/min/1.73 square meters)    Stage 3A Moderate CKD (GFR = 45-59 mL/min/1.73 square meters)    Stage 3B Moderate CKD (GFR = 30-44 mL/min/1.73 square meters)    Stage 4 Severe CKD (GFR = 15-29 mL/min/1.73 square meters)    Stage 5 End Stage CKD (GFR <15 mL/min/1.73 square meters)  Note: GFR calculation is accurate only with a steady state creatinine       Labs reviewed by me are significant for: Blood in urine, no evidence of infection    Clinical decision rules/scores are significant for:     Discussed case with:   Considered admission for:     Treatment and Disposition  ED course: Patient seen and examined.  Treated for pain.  CT with kidney stone.  Patient well-appearing.  Will plan to discharge with follow-up with urology  Shared decision making:   Code status:     ED Course         Critical Care Time  Procedures

## 2025-01-27 ENCOUNTER — APPOINTMENT (OUTPATIENT)
Dept: PHYSICAL THERAPY | Facility: REHABILITATION | Age: 65
End: 2025-01-27
Payer: MEDICARE

## 2025-01-29 ENCOUNTER — HOSPITAL ENCOUNTER (INPATIENT)
Facility: HOSPITAL | Age: 65
LOS: 1 days | Discharge: HOME | DRG: 694 | End: 2025-01-31
Attending: EMERGENCY MEDICINE | Admitting: INTERNAL MEDICINE
Payer: MEDICARE

## 2025-01-29 ENCOUNTER — APPOINTMENT (EMERGENCY)
Dept: RADIOLOGY | Facility: HOSPITAL | Age: 65
DRG: 694 | End: 2025-01-29
Payer: MEDICARE

## 2025-01-29 ENCOUNTER — OFFICE VISIT (OUTPATIENT)
Dept: UROLOGY | Facility: CLINIC | Age: 65
End: 2025-01-29

## 2025-01-29 ENCOUNTER — TELEPHONE (OUTPATIENT)
Age: 65
End: 2025-01-29

## 2025-01-29 VITALS
HEIGHT: 70 IN | BODY MASS INDEX: 21.33 KG/M2 | WEIGHT: 149 LBS | SYSTOLIC BLOOD PRESSURE: 100 MMHG | DIASTOLIC BLOOD PRESSURE: 80 MMHG | HEART RATE: 96 BPM | OXYGEN SATURATION: 97 %

## 2025-01-29 DIAGNOSIS — N20.0 KIDNEY STONE: Primary | ICD-10-CM

## 2025-01-29 DIAGNOSIS — K59.00 CONSTIPATION: ICD-10-CM

## 2025-01-29 DIAGNOSIS — N20.1 URETEROLITHIASIS: ICD-10-CM

## 2025-01-29 DIAGNOSIS — N20.0 NEPHROLITHIASIS: ICD-10-CM

## 2025-01-29 DIAGNOSIS — N13.30 HYDRONEPHROSIS: ICD-10-CM

## 2025-01-29 DIAGNOSIS — R10.9 RIGHT FLANK PAIN: ICD-10-CM

## 2025-01-29 LAB
ALBUMIN SERPL BCG-MCNC: 4.2 G/DL (ref 3.5–5)
ALP SERPL-CCNC: 43 U/L (ref 34–104)
ALT SERPL W P-5'-P-CCNC: 28 U/L (ref 7–52)
ANION GAP SERPL CALCULATED.3IONS-SCNC: 11 MMOL/L (ref 4–13)
AST SERPL W P-5'-P-CCNC: 26 U/L (ref 13–39)
BACTERIA UR QL AUTO: ABNORMAL /HPF
BASOPHILS # BLD AUTO: 0.04 THOUSANDS/ΜL (ref 0–0.1)
BASOPHILS NFR BLD AUTO: 1 % (ref 0–1)
BILIRUB SERPL-MCNC: 0.6 MG/DL (ref 0.2–1)
BILIRUB UR QL STRIP: NEGATIVE
BUN SERPL-MCNC: 31 MG/DL (ref 5–25)
CALCIUM SERPL-MCNC: 9.5 MG/DL (ref 8.4–10.2)
CHLORIDE SERPL-SCNC: 97 MMOL/L (ref 96–108)
CLARITY UR: CLEAR
CO2 SERPL-SCNC: 27 MMOL/L (ref 21–32)
COLOR UR: YELLOW
CREAT SERPL-MCNC: 1.5 MG/DL (ref 0.6–1.3)
EOSINOPHIL # BLD AUTO: 0.11 THOUSAND/ΜL (ref 0–0.61)
EOSINOPHIL NFR BLD AUTO: 2 % (ref 0–6)
ERYTHROCYTE [DISTWIDTH] IN BLOOD BY AUTOMATED COUNT: 12.5 % (ref 11.6–15.1)
GFR SERPL CREATININE-BSD FRML MDRD: 47 ML/MIN/1.73SQ M
GLUCOSE SERPL-MCNC: 104 MG/DL (ref 65–140)
GLUCOSE UR STRIP-MCNC: NEGATIVE MG/DL
HCT VFR BLD AUTO: 43.4 % (ref 36.5–49.3)
HGB BLD-MCNC: 14.2 G/DL (ref 12–17)
HGB UR QL STRIP.AUTO: NEGATIVE
IMM GRANULOCYTES # BLD AUTO: 0.02 THOUSAND/UL (ref 0–0.2)
IMM GRANULOCYTES NFR BLD AUTO: 0 % (ref 0–2)
KETONES UR STRIP-MCNC: NEGATIVE MG/DL
LEUKOCYTE ESTERASE UR QL STRIP: NEGATIVE
LYMPHOCYTES # BLD AUTO: 0.63 THOUSANDS/ΜL (ref 0.6–4.47)
LYMPHOCYTES NFR BLD AUTO: 8 % (ref 14–44)
MCH RBC QN AUTO: 29.8 PG (ref 26.8–34.3)
MCHC RBC AUTO-ENTMCNC: 32.7 G/DL (ref 31.4–37.4)
MCV RBC AUTO: 91 FL (ref 82–98)
MONOCYTES # BLD AUTO: 0.78 THOUSAND/ΜL (ref 0.17–1.22)
MONOCYTES NFR BLD AUTO: 10 % (ref 4–12)
MUCOUS THREADS UR QL AUTO: ABNORMAL
NEUTROPHILS # BLD AUTO: 5.91 THOUSANDS/ΜL (ref 1.85–7.62)
NEUTS SEG NFR BLD AUTO: 79 % (ref 43–75)
NITRITE UR QL STRIP: NEGATIVE
NON-SQ EPI CELLS URNS QL MICRO: ABNORMAL /HPF
NRBC BLD AUTO-RTO: 0 /100 WBCS
PH UR STRIP.AUTO: 5.5 [PH]
PLATELET # BLD AUTO: 233 THOUSANDS/UL (ref 149–390)
PMV BLD AUTO: 9.8 FL (ref 8.9–12.7)
POTASSIUM SERPL-SCNC: 3.9 MMOL/L (ref 3.5–5.3)
PROT SERPL-MCNC: 8 G/DL (ref 6.4–8.4)
PROT UR STRIP-MCNC: ABNORMAL MG/DL
RBC # BLD AUTO: 4.77 MILLION/UL (ref 3.88–5.62)
RBC #/AREA URNS AUTO: ABNORMAL /HPF
SODIUM SERPL-SCNC: 135 MMOL/L (ref 135–147)
SP GR UR STRIP.AUTO: 1.02 (ref 1–1.03)
UROBILINOGEN UR STRIP-ACNC: <2 MG/DL
WBC # BLD AUTO: 7.49 THOUSAND/UL (ref 4.31–10.16)
WBC #/AREA URNS AUTO: ABNORMAL /HPF

## 2025-01-29 PROCEDURE — 99284 EMERGENCY DEPT VISIT MOD MDM: CPT

## 2025-01-29 PROCEDURE — 74176 CT ABD & PELVIS W/O CONTRAST: CPT

## 2025-01-29 PROCEDURE — 96375 TX/PRO/DX INJ NEW DRUG ADDON: CPT

## 2025-01-29 PROCEDURE — 99285 EMERGENCY DEPT VISIT HI MDM: CPT | Performed by: EMERGENCY MEDICINE

## 2025-01-29 PROCEDURE — 85025 COMPLETE CBC W/AUTO DIFF WBC: CPT | Performed by: EMERGENCY MEDICINE

## 2025-01-29 PROCEDURE — 36415 COLL VENOUS BLD VENIPUNCTURE: CPT | Performed by: EMERGENCY MEDICINE

## 2025-01-29 PROCEDURE — 80053 COMPREHEN METABOLIC PANEL: CPT | Performed by: EMERGENCY MEDICINE

## 2025-01-29 PROCEDURE — 96365 THER/PROPH/DIAG IV INF INIT: CPT

## 2025-01-29 PROCEDURE — 81001 URINALYSIS AUTO W/SCOPE: CPT | Performed by: EMERGENCY MEDICINE

## 2025-01-29 RX ORDER — ONDANSETRON 2 MG/ML
4 INJECTION INTRAMUSCULAR; INTRAVENOUS ONCE
Status: COMPLETED | OUTPATIENT
Start: 2025-01-29 | End: 2025-01-29

## 2025-01-29 RX ORDER — KETOROLAC TROMETHAMINE 30 MG/ML
15 INJECTION, SOLUTION INTRAMUSCULAR; INTRAVENOUS ONCE
Status: COMPLETED | OUTPATIENT
Start: 2025-01-29 | End: 2025-01-29

## 2025-01-29 RX ORDER — SODIUM CHLORIDE, SODIUM GLUCONATE, SODIUM ACETATE, POTASSIUM CHLORIDE, MAGNESIUM CHLORIDE, SODIUM PHOSPHATE, DIBASIC, AND POTASSIUM PHOSPHATE .53; .5; .37; .037; .03; .012; .00082 G/100ML; G/100ML; G/100ML; G/100ML; G/100ML; G/100ML; G/100ML
1000 INJECTION, SOLUTION INTRAVENOUS ONCE
Status: COMPLETED | OUTPATIENT
Start: 2025-01-29 | End: 2025-01-30

## 2025-01-29 RX ADMIN — ONDANSETRON 4 MG: 2 INJECTION INTRAMUSCULAR; INTRAVENOUS at 23:03

## 2025-01-29 RX ADMIN — SODIUM CHLORIDE, SODIUM GLUCONATE, SODIUM ACETATE, POTASSIUM CHLORIDE, MAGNESIUM CHLORIDE, SODIUM PHOSPHATE, DIBASIC, AND POTASSIUM PHOSPHATE 1000 ML: .53; .5; .37; .037; .03; .012; .00082 INJECTION, SOLUTION INTRAVENOUS at 23:04

## 2025-01-29 RX ADMIN — KETOROLAC TROMETHAMINE 15 MG: 30 INJECTION, SOLUTION INTRAMUSCULAR; INTRAVENOUS at 23:03

## 2025-01-29 NOTE — TELEPHONE ENCOUNTER
Patient transferred by [facility]P. Patient will be seen today in office for calculi. Reviewed ED precautions with patient as well as suggested strining urine.

## 2025-01-29 NOTE — PROGRESS NOTES
1/29/2025    No chief complaint on file.    Assessment and Plan    64 y.o. male managed by Dr. Whaley     1. Ureterolithiasis  Assessment & Plan:  Kidney stone   Ed visit 1/22- L sided flank pain and gross hematuria   Ct scan- Right distal ureteral calculus, 3 mm resulting in right-sided hydroureteronephrosis and delayed right renal nephrogram and accounting for the patient's acute symptoms.   Reviewed percent of passage stones less then 4 mm- 80-90% of passage   Plan to continue to strain all urine, increase hydration, flomax   Continues with flank pain intermittent   Reviewed comfort measures   Patient comfortable continuing to monitor stone   ED precuaitons reviewed   Plan to follow up in 2-3 weeks to evaluate for passage   Orders:  -     Ambulatory Referral to Urology    History of Present Illness  Wayne Mosley is a 64 y.o. male here for evaluation of ureterolithiasis.  Patient was seen in the ED on 1/22 for left-sided flank pain and gross hematuria.  CT scan was done which showed a distal 3 mm ureteral calculus.  Patient was discharged with medical expulsion therapy.  He continues taking Flomax and increasing hydration.  He continues to strain all urine.  He continues with intermittent flank pain which is managed with over-the-counter analgesics and medications prescribed from ED.  He reports pain is manageable as of now but does have bouts of strong flank pain.  He is aware of ED precautions.  He reports having 2 kidney stones in the past.  Last surgical intervention for his stones was 2018.  Previously he was able to pass the stone with medical expulsion therapy.  He denies dysuria or gross hematuria at this time.      Patient has a medical history of RALP/bilateral PLND on 11/30/2021 through [facility]       Review of Systems   Constitutional:  Negative for chills and fever.   HENT:  Negative for ear pain and sore throat.    Eyes:  Negative for pain and visual disturbance.   Respiratory:  Negative  "for cough and shortness of breath.    Cardiovascular:  Negative for chest pain and palpitations.   Gastrointestinal:  Negative for abdominal pain and vomiting.   Genitourinary:  Positive for flank pain. Negative for decreased urine volume, difficulty urinating, dysuria, frequency, hematuria and urgency.   Musculoskeletal:  Negative for arthralgias and back pain.   Skin:  Negative for color change and rash.   Neurological:  Negative for seizures and syncope.   All other systems reviewed and are negative.               Vitals  Vitals:    01/29/25 1337   BP: 100/80   BP Location: Left arm   Patient Position: Sitting   Cuff Size: Adult   Pulse: 96   SpO2: 97%   Weight: 67.6 kg (149 lb)   Height: 5' 10\" (1.778 m)       Physical Exam  Vitals reviewed.   Constitutional:       Appearance: Normal appearance.   HENT:      Head: Normocephalic and atraumatic.   Eyes:      Conjunctiva/sclera: Conjunctivae normal.   Pulmonary:      Effort: Pulmonary effort is normal.   Abdominal:      General: Abdomen is flat. There is no distension.      Palpations: Abdomen is soft.      Tenderness: There is no abdominal tenderness.   Musculoskeletal:         General: Normal range of motion.      Cervical back: Normal range of motion.   Skin:     General: Skin is warm and dry.   Neurological:      General: No focal deficit present.      Mental Status: He is alert and oriented to person, place, and time.   Psychiatric:         Mood and Affect: Mood normal.         Behavior: Behavior normal.         Thought Content: Thought content normal.         Judgment: Judgment normal.           Past History  Past Medical History:   Diagnosis Date   • Abnormal blood chemistry    • Allergic    • Allergic rhinitis     last assessed 08/22/2014   • Anxiety    • Cancer (HCC)    • Elevated PSA    • Erectile dysfunction of non-organic origin    • Hyperglycemia     Last Assessed: 1/20/2016    • Hyperlipidemia    • Kidney stone    • Paresthesia of foot     last " assessed 08/14/2015     Social History     Socioeconomic History   • Marital status: /Civil Union     Spouse name: None   • Number of children: None   • Years of education: None   • Highest education level: None   Occupational History   • None   Tobacco Use   • Smoking status: Never   • Smokeless tobacco: Never   Vaping Use   • Vaping status: Never Used   Substance and Sexual Activity   • Alcohol use: Yes     Comment: occasional/social alcohol use    • Drug use: No   • Sexual activity: Not Currently     Partners: Female     Birth control/protection: Abstinence   Other Topics Concern   • None   Social History Narrative    Feels Safe at home     No guns in the home     Always uses seat belt      Social Drivers of Health     Financial Resource Strain: Low Risk  (9/4/2024)    Received from [facility]    Overall Financial Resource Strain (CARDIA)    • Difficulty of Paying Living Expenses: Not very hard   Food Insecurity: No Food Insecurity (10/12/2024)    Nursing - Inadequate Food Risk Classification    • Worried About Running Out of Food in the Last Year: Never true    • Ran Out of Food in the Last Year: Never true    • Ran Out of Food in the Last Year: Not on file   Transportation Needs: No Transportation Needs (10/12/2024)    PRAPARE - Transportation    • Lack of Transportation (Medical): No    • Lack of Transportation (Non-Medical): No   Physical Activity: Not on file   Stress: No Stress Concern Present (9/4/2024)    Received from [facility]    [employer] - Occupational Stress Questionnaire    • Feeling of Stress : Only a little   Social Connections: Feeling Socially Integrated (9/4/2024)    Received from [facility]    OASIS : Social Isolation    • How often do you feel lonely or isolated from those around you?: Rarely   Intimate Partner Violence: Not At Risk (9/4/2024)    Received from [facility]  [facility]    Humiliation, Afraid, Rape, and Kick questionnaire    • Fear of Current or Ex-Partner: No    • Emotionally Abused: No    • Physically Abused: No    • Sexually Abused: No   Housing Stability: Low Risk  (10/12/2024)    Housing Stability Vital Sign    • Unable to Pay for Housing in the Last Year: No    • Number of Times Moved in the Last Year: 0    • Homeless in the Last Year: No     Social History     Tobacco Use   Smoking Status Never   Smokeless Tobacco Never     Family History   Problem Relation Age of Onset   • Lung cancer Mother    • Cancer Mother    • Lung cancer Father    • Cancer Father        The following portions of the patient's history were reviewed and updated as appropriate: allergies, current medications, past medical history, past social history, past surgical history and problem list.    Results  No results found for this or any previous visit (from the past hour).]  Lab Results   Component Value Date    PSA 5.4 (H) 05/07/2021    PSA 4.5 (H) 11/09/2020    PSA 3.9 02/29/2020    PSA 8.9 (H) 01/09/2020     Lab Results   Component Value Date    GLUCOSE 108 09/14/2019    CALCIUM 9.5 01/22/2025     02/14/2017    K 4.3 01/22/2025    CO2 24 01/22/2025     01/22/2025    BUN 25 01/22/2025    CREATININE 1.08 01/22/2025     Lab Results   Component Value Date    WBC 4.09 (L) 01/22/2025    HGB 14.3 01/22/2025    HCT 43.3 01/22/2025    MCV 91 01/22/2025     01/22/2025

## 2025-01-29 NOTE — ASSESSMENT & PLAN NOTE
Kidney stone   Ed visit 1/22- L sided flank pain and gross hematuria   Ct scan- Right distal ureteral calculus, 3 mm resulting in right-sided hydroureteronephrosis and delayed right renal nephrogram and accounting for the patient's acute symptoms.   Reviewed percent of passage stones less then 4 mm- 80-90% of passage   Plan to continue to strain all urine, increase hydration, flomax   Continues with flank pain intermittent   Reviewed comfort measures   Patient comfortable continuing to monitor stone   ED precuaitons reviewed   Plan to follow up in 2-3 weeks to evaluate for passage

## 2025-01-29 NOTE — TELEPHONE ENCOUNTER
Pt called to schedule a NP appointment for kidney stones. Pt was evaluated in the ER on 1/22/25 where a CT abdomen revealed a 3mm stone along with additional smaller stones. Pt is experiencing a substantial amount of pain and has not yet passed the stone. Appointment scheduled for 1/29/25 and call was connected with CTS for assistance.

## 2025-01-30 ENCOUNTER — TELEPHONE (OUTPATIENT)
Dept: UROLOGY | Facility: AMBULATORY SURGERY CENTER | Age: 65
End: 2025-01-30

## 2025-01-30 ENCOUNTER — APPOINTMENT (OUTPATIENT)
Dept: RADIOLOGY | Facility: HOSPITAL | Age: 65
DRG: 694 | End: 2025-01-30
Payer: MEDICARE

## 2025-01-30 ENCOUNTER — ANESTHESIA (OUTPATIENT)
Dept: PERIOP | Facility: HOSPITAL | Age: 65
DRG: 694 | End: 2025-01-30
Payer: MEDICARE

## 2025-01-30 ENCOUNTER — ANESTHESIA EVENT (OUTPATIENT)
Dept: PERIOP | Facility: HOSPITAL | Age: 65
DRG: 694 | End: 2025-01-30
Payer: MEDICARE

## 2025-01-30 PROBLEM — N17.9 AKI (ACUTE KIDNEY INJURY) (HCC): Status: ACTIVE | Noted: 2025-01-30

## 2025-01-30 PROCEDURE — 99233 SBSQ HOSP IP/OBS HIGH 50: CPT | Performed by: PHYSICIAN ASSISTANT

## 2025-01-30 PROCEDURE — 0TC68ZZ EXTIRPATION OF MATTER FROM RIGHT URETER, VIA NATURAL OR ARTIFICIAL OPENING ENDOSCOPIC: ICD-10-PCS

## 2025-01-30 PROCEDURE — 96366 THER/PROPH/DIAG IV INF ADDON: CPT

## 2025-01-30 PROCEDURE — 99223 1ST HOSP IP/OBS HIGH 75: CPT | Performed by: INTERNAL MEDICINE

## 2025-01-30 PROCEDURE — 82360 CALCULUS ASSAY QUANT: CPT

## 2025-01-30 PROCEDURE — 99223 1ST HOSP IP/OBS HIGH 75: CPT

## 2025-01-30 PROCEDURE — C1769 GUIDE WIRE: HCPCS

## 2025-01-30 PROCEDURE — 52352 CYSTOURETERO W/STONE REMOVE: CPT

## 2025-01-30 PROCEDURE — 74018 RADEX ABDOMEN 1 VIEW: CPT

## 2025-01-30 RX ORDER — HYDROMORPHONE HCL/PF 1 MG/ML
0.4 SYRINGE (ML) INJECTION
Status: DISCONTINUED | OUTPATIENT
Start: 2025-01-30 | End: 2025-01-30 | Stop reason: HOSPADM

## 2025-01-30 RX ORDER — ACETAMINOPHEN 325 MG/1
650 TABLET ORAL EVERY 6 HOURS PRN
Status: DISCONTINUED | OUTPATIENT
Start: 2025-01-30 | End: 2025-01-31 | Stop reason: HOSPADM

## 2025-01-30 RX ORDER — SODIUM CHLORIDE 9 MG/ML
125 INJECTION, SOLUTION INTRAVENOUS CONTINUOUS
Status: DISCONTINUED | OUTPATIENT
Start: 2025-01-30 | End: 2025-01-31

## 2025-01-30 RX ORDER — OXYCODONE HYDROCHLORIDE 5 MG/1
5 TABLET ORAL EVERY 6 HOURS PRN
Refills: 0 | Status: DISCONTINUED | OUTPATIENT
Start: 2025-01-30 | End: 2025-01-31 | Stop reason: HOSPADM

## 2025-01-30 RX ORDER — ONDANSETRON 2 MG/ML
4 INJECTION INTRAMUSCULAR; INTRAVENOUS EVERY 6 HOURS PRN
Status: DISCONTINUED | OUTPATIENT
Start: 2025-01-30 | End: 2025-01-31 | Stop reason: HOSPADM

## 2025-01-30 RX ORDER — EZETIMIBE 10 MG/1
10 TABLET ORAL DAILY
Status: DISCONTINUED | OUTPATIENT
Start: 2025-01-30 | End: 2025-01-31 | Stop reason: HOSPADM

## 2025-01-30 RX ORDER — TAMSULOSIN HYDROCHLORIDE 0.4 MG/1
0.4 CAPSULE ORAL
Status: DISCONTINUED | OUTPATIENT
Start: 2025-01-30 | End: 2025-01-31 | Stop reason: HOSPADM

## 2025-01-30 RX ORDER — FENTANYL CITRATE 50 UG/ML
INJECTION, SOLUTION INTRAMUSCULAR; INTRAVENOUS AS NEEDED
Status: DISCONTINUED | OUTPATIENT
Start: 2025-01-30 | End: 2025-01-30

## 2025-01-30 RX ORDER — CEFAZOLIN SODIUM 2 G/50ML
SOLUTION INTRAVENOUS AS NEEDED
Status: DISCONTINUED | OUTPATIENT
Start: 2025-01-30 | End: 2025-01-30

## 2025-01-30 RX ORDER — FENTANYL CITRATE/PF 50 MCG/ML
25 SYRINGE (ML) INJECTION
Status: DISCONTINUED | OUTPATIENT
Start: 2025-01-30 | End: 2025-01-30 | Stop reason: HOSPADM

## 2025-01-30 RX ORDER — HYDROMORPHONE HCL/PF 1 MG/ML
0.5 SYRINGE (ML) INJECTION EVERY 4 HOURS PRN
Status: DISCONTINUED | OUTPATIENT
Start: 2025-01-30 | End: 2025-01-31

## 2025-01-30 RX ORDER — ATORVASTATIN CALCIUM 40 MG/1
40 TABLET, FILM COATED ORAL
Status: DISCONTINUED | OUTPATIENT
Start: 2025-01-30 | End: 2025-01-31 | Stop reason: HOSPADM

## 2025-01-30 RX ORDER — PROPOFOL 10 MG/ML
INJECTION, EMULSION INTRAVENOUS AS NEEDED
Status: DISCONTINUED | OUTPATIENT
Start: 2025-01-30 | End: 2025-01-30

## 2025-01-30 RX ORDER — ONDANSETRON 2 MG/ML
INJECTION INTRAMUSCULAR; INTRAVENOUS AS NEEDED
Status: DISCONTINUED | OUTPATIENT
Start: 2025-01-30 | End: 2025-01-30

## 2025-01-30 RX ORDER — ONDANSETRON 2 MG/ML
4 INJECTION INTRAMUSCULAR; INTRAVENOUS ONCE AS NEEDED
Status: DISCONTINUED | OUTPATIENT
Start: 2025-01-30 | End: 2025-01-30 | Stop reason: HOSPADM

## 2025-01-30 RX ORDER — HEPARIN SODIUM 5000 [USP'U]/ML
5000 INJECTION, SOLUTION INTRAVENOUS; SUBCUTANEOUS EVERY 8 HOURS SCHEDULED
Status: DISCONTINUED | OUTPATIENT
Start: 2025-01-30 | End: 2025-01-31 | Stop reason: HOSPADM

## 2025-01-30 RX ORDER — DEXAMETHASONE SODIUM PHOSPHATE 10 MG/ML
INJECTION, SOLUTION INTRAMUSCULAR; INTRAVENOUS AS NEEDED
Status: DISCONTINUED | OUTPATIENT
Start: 2025-01-30 | End: 2025-01-30

## 2025-01-30 RX ADMIN — TAMSULOSIN HYDROCHLORIDE 0.4 MG: 0.4 CAPSULE ORAL at 15:46

## 2025-01-30 RX ADMIN — SODIUM CHLORIDE 125 ML/HR: 0.9 INJECTION, SOLUTION INTRAVENOUS at 13:44

## 2025-01-30 RX ADMIN — OXYCODONE HYDROCHLORIDE 5 MG: 5 TABLET ORAL at 20:45

## 2025-01-30 RX ADMIN — ONDANSETRON 4 MG: 2 INJECTION INTRAMUSCULAR; INTRAVENOUS at 07:59

## 2025-01-30 RX ADMIN — ONDANSETRON 4 MG: 2 INJECTION INTRAMUSCULAR; INTRAVENOUS at 11:12

## 2025-01-30 RX ADMIN — CEFAZOLIN SODIUM 2000 MG: 2 SOLUTION INTRAVENOUS at 11:28

## 2025-01-30 RX ADMIN — SODIUM CHLORIDE 125 ML/HR: 0.9 INJECTION, SOLUTION INTRAVENOUS at 20:45

## 2025-01-30 RX ADMIN — HEPARIN SODIUM 5000 UNITS: 5000 INJECTION, SOLUTION INTRAVENOUS; SUBCUTANEOUS at 20:45

## 2025-01-30 RX ADMIN — ATORVASTATIN CALCIUM 40 MG: 40 TABLET, FILM COATED ORAL at 15:46

## 2025-01-30 RX ADMIN — EZETIMIBE 10 MG: 10 TABLET ORAL at 08:15

## 2025-01-30 RX ADMIN — DEXAMETHASONE SODIUM PHOSPHATE 10 MG: 10 INJECTION, SOLUTION INTRAMUSCULAR; INTRAVENOUS at 11:12

## 2025-01-30 RX ADMIN — FENTANYL CITRATE 50 MCG: 50 INJECTION INTRAMUSCULAR; INTRAVENOUS at 11:11

## 2025-01-30 RX ADMIN — FENTANYL CITRATE 25 MCG: 50 INJECTION INTRAMUSCULAR; INTRAVENOUS at 12:32

## 2025-01-30 RX ADMIN — SODIUM CHLORIDE 125 ML/HR: 0.9 INJECTION, SOLUTION INTRAVENOUS at 02:10

## 2025-01-30 RX ADMIN — PROPOFOL 150 MG: 10 INJECTION, EMULSION INTRAVENOUS at 11:11

## 2025-01-30 RX ADMIN — ACETAMINOPHEN 650 MG: 325 TABLET, FILM COATED ORAL at 15:46

## 2025-01-30 RX ADMIN — HYDROMORPHONE HYDROCHLORIDE 0.5 MG: 1 INJECTION, SOLUTION INTRAMUSCULAR; INTRAVENOUS; SUBCUTANEOUS at 08:00

## 2025-01-30 RX ADMIN — PROPOFOL 50 MG: 10 INJECTION, EMULSION INTRAVENOUS at 11:14

## 2025-01-30 NOTE — H&P
"H&P - Hospitalist   Name: Wayne Mosley 64 y.o. male I MRN: [id number]  Unit/Bed#: ED 23 I Date of Admission: 1/29/2025   Date of Service: 1/30/2025 I Hospital Day: 0     Assessment & Plan  Ureterolithiasis  64-year-old male who was recently diagnosed with kidney stone approximately 3 mm in the right distal ureter approximately 1 week ago causing mild hydronephrosis presented back to the emergency department with worsening abdominal pain and poor appetite  CT scan revealed \"Worsened right hydronephrosis, now moderate, secondary to an obstructing 0.3 cm calculus at the ureterovesicular junction \"  Also with notable acute kidney injury  Suspect will likely need intervention  IV fluids  Pain control  Flomax  N.p.o. for now  Will consult urology for further evaluation  BPH with obstruction/lower urinary tract symptoms  Continue Flomax  Hyperlipidemia  Continue statin and Zetia      VTE Pharmacologic Prophylaxis:   Moderate Risk (Score 3-4) - Pharmacological DVT Prophylaxis Ordered: heparin.  Code Status:  full code  Discussion with family: Patient declined call to .     Anticipated Length of Stay: Patient will be admitted on an observation basis with an anticipated length of stay of less than 2 midnights secondary to kidney stone.    History of Present Illness   Chief Complaint: Flank pain    Wayne Mosley is a 64 y.o. male with past medical history significant for BPH, hyperlipidemia initially presented with right flank pain.  Was recently seen in the emergency department approximately week ago with right flank pain was diagnosed kidney stone.  Patient reports since going home reports worsening pain with associated nausea.  He otherwise denies any acute complaints.  Denies fevers, chills, chest pain, shortness of breath, palpitations, cough or any other complaints  Review of Systems    Historical Information   Past Medical History:   Diagnosis Date    Abnormal blood chemistry     Allergic     " Allergic rhinitis     last assessed 08/22/2014    Anxiety     Cancer (HCC)     Elevated PSA     Erectile dysfunction of non-organic origin     Hyperglycemia     Last Assessed: 1/20/2016     Hyperlipidemia     Kidney stone     Paresthesia of foot     last assessed 08/14/2015     Past Surgical History:   Procedure Laterality Date    COLONOSCOPY      FOOT SURGERY      HAND SURGERY Left     HERNIA REPAIR      INGUINAL HERNIA REPAIR      KNEE SURGERY      RI COLONOSCOPY FLX DX W/COLLJ SPEC WHEN PFRMD N/A 01/10/2017    Procedure: COLONOSCOPY;  Surgeon: Car Barbour MD;  Location: [facility];  Service: Gastroenterology    RI CYSTO/URETERO W/LITHOTRIPSY &INDWELL STENT INSRT Left 02/01/2018    Procedure: CYSTOSCOPY, LEFT URETEROSCOPY, RETROGRADE PYELOGRAM;  Surgeon: James Whaley MD;  Location: [facility];  Service: Urology    PROSTATE BIOPSY  12/27/2019    PROSTATE SURGERY       Social History     Tobacco Use    Smoking status: Never    Smokeless tobacco: Never   Vaping Use    Vaping status: Never Used   Substance and Sexual Activity    Alcohol use: Yes     Comment: occasional/social alcohol use     Drug use: No    Sexual activity: Not Currently     Partners: Female     Birth control/protection: Abstinence     E-Cigarette/Vaping    E-Cigarette Use Never User      E-Cigarette/Vaping Substances    Nicotine No     THC No     CBD No     Flavoring No     Other No     Unknown No      Family History   Problem Relation Age of Onset    Lung cancer Mother     Cancer Mother     Lung cancer Father     Cancer Father      Social History:  Marital Status: /Civil Union     Patient Pre-hospital Living Situation: Home  Patient Pre-hospital Level of Mobility: walks  Patient Pre-hospital Diet Restrictions: none    Meds/Allergies   I have reviewed home medications with patient personally.  Prior to Admission medications    Medication Sig Start Date End Date Taking? Authorizing Provider   ascorbic acid (VITAMIN C) 500 mg tablet  Take 1,000 mg by mouth daily    Historical Provider, MD   DAILY MULTIPLE VITAMINS/IRON PO Take 1 tablet by mouth daily    Historical Provider, MD   ezetimibe (ZETIA) 10 mg tablet Take 1 tablet (10 mg total) by mouth daily 10/17/24 10/12/25  Martinez Chen DO   oxyCODONE (Roxicodone) 5 immediate release tablet Take 1 tablet (5 mg total) by mouth every 4 (four) hours as needed for moderate pain for up to 12 doses Max Daily Amount: 30 mg 1/22/25   Jacobo Landry MD   rosuvastatin (CRESTOR) 20 MG tablet TAKE 1 TABLET BY MOUTH EVERY DAY 11/6/24   Martinez Cehn DO   tadalafil (CIALIS) 5 MG tablet 1 tab daily 9/8/21   Martinez Chen DO   tamsulosin (FLOMAX) 0.4 mg Take 1 capsule (0.4 mg total) by mouth daily with dinner 1/22/25   Flori Fountain MD     Allergies   Allergen Reactions    Other      trees    Pollen Extract        Objective :  Temp:  [99.2 °F (37.3 °C)] 99.2 °F (37.3 °C)  HR:  [95-96] 95  BP: (100-149)/(80-90) 149/90  Resp:  [20] 20  SpO2:  [97 %] 97 %  O2 Device: None (Room air)    Physical Exam     Lines/Drains:            Lab Results: I have reviewed the following results:  Results from last 7 days   Lab Units 01/29/25  2218   WBC Thousand/uL 7.49   HEMOGLOBIN g/dL 14.2   HEMATOCRIT % 43.4   PLATELETS Thousands/uL 233   SEGS PCT % 79*   LYMPHO PCT % 8*   MONO PCT % 10   EOS PCT % 2     Results from last 7 days   Lab Units 01/29/25  2218   SODIUM mmol/L 135   POTASSIUM mmol/L 3.9   CHLORIDE mmol/L 97   CO2 mmol/L 27   BUN mg/dL 31*   CREATININE mg/dL 1.50*   ANION GAP mmol/L 11   CALCIUM mg/dL 9.5   ALBUMIN g/dL 4.2   TOTAL BILIRUBIN mg/dL 0.60   ALK PHOS U/L 43   ALT U/L 28   AST U/L 26   GLUCOSE RANDOM mg/dL 104             Lab Results   Component Value Date    HGBA1C 5.6 10/14/2024    HGBA1C 5.5 03/15/2024    HGBA1C 5.4 09/26/2023           Imaging Results Review: I personally reviewed the following image studies/reports in PACS and discussed pertinent findings with Radiology: chest xray. My interpretation of  the radiology images/reports is:  .  Other Study Results Review: EKG was reviewed.     Administrative Statements   I have spent a total time of 76 minutes in caring for this patient on the day of the visit/encounter including Diagnostic results.    ** Please Note: This note has been constructed using a voice recognition system. **

## 2025-01-30 NOTE — ANESTHESIA PREPROCEDURE EVALUATION
Procedure:  CYSTOSCOPY URETEROSCOPY WITH LITHOTRIPSY HOLMIUM LASER, RETROGRADE PYELOGRAM AND INSERTION STENT URETERAL (Right: Bladder)    Relevant Problems   CARDIO   (+) Elevated coronary artery calcium score   (+) Hyperlipidemia      /RENAL   (+) BPH with obstruction/lower urinary tract symptoms   (+) Prostate cancer (HCC)      HEMATOLOGY   (+) Thrombocytopenia (HCC)      NEURO/PSYCH   (+) Anxiety        Exercise nuclear stress test appears negative for myocardial ischemia.  There is decreased counts inferior wall fixed with normal wall motion consistent with diaphragmatic attenuation.    Stress ECG: The stress ECG is negative for ischemia after maximal exercise, without reproduction of symptoms.    Stress ECG: A Prieto protocol stress test was performed. The patient reached stage 3.0of the protocol after exercising for 9 min and had a maximal HR of 139 bpm (90% of MPHR) and 10.1 METS. The patient experienced no angina during the test. Blood pressure demonstrated a normal response and heart rate demonstrated a normal response to stress.    Stress Function: Left ventricular function post-stress is normal. Stress ejection fraction is 62%.    Rare PVCs.     Physical Exam    Airway    Mallampati score: II  TM Distance: >3 FB  Neck ROM: full     Dental   No notable dental hx     Cardiovascular  Rhythm: regular, No weak pulses    Pulmonary   No stridor    Other Findings        Anesthesia Plan  ASA Score- 2     Anesthesia Type- general with ASA Monitors.         Additional Monitors:     Airway Plan: LMA.           Plan Factors-    Chart reviewed.   Existing labs reviewed. Patient summary reviewed.                  Induction- intravenous.    Postoperative Plan- Plan for postoperative opioid use. Planned trial extubation    Perioperative Resuscitation Plan - Level 1 - Full Code.       Informed Consent- Anesthetic plan and risks discussed with patient.  I personally reviewed this patient with the CRNA. Discussed and agreed  on the Anesthesia Plan with the CRNA..      NPO Status:  No vitals data found for the desired time range.

## 2025-01-30 NOTE — OP NOTE
PATIENT:  Wayne Mosley (MRN [id number])    DATE OF PROCEDURE:   1/30/2025    PRE-OP DIAGNOSES:   Kidney stone [N20.0]     POST-OP DIAGNOSES AND OPERATIVE FINDINGS:   Right ureteral stone    PROCEDURES:  Cystoscopy   Right ureteroscopy with basket extraction of stone    SURGEON:  Rivas Maxwell MD    ASSISTANTS:  Surgeons and Role:     * Rivas Maxwell MD - Primary  Circulator: Suzanne Chauhan RN  Radiology Technologist: Flavia Villaneuva  Relief Circulator: Jaquan Waldron RN  Scrub Person: Adela Lozano    NOTE:  There were no qualified teaching residents to assist with this case    ANESTHESIA TYPE:  General anesthesia    ESTIMATED BLOOD LOSS: <5 mL  IV FLUIDS: 1000 mL  URINE OUTPUT: Unable to measure     COMPLICATIONS:   None    SPECIMENS:   ID Type Source Tests Collected by Time Destination   A : right urethral stone Calculus Ureter, Right STONE ANALYSIS Rivas Maxwell MD 1/30/2025 1141          ANTIBIOTICS:  Cefazolin    INTRAOPERATIVE THROMBOEMBOLISM PROPHYLAXIS:  Pneumatic compression stockings     DRAINS:   None    INDICATIONS:   Wayne Mosley is a 64 y.o. male with persistent pain from a right UVJ stone with obstruction. He is consented for right ureteroscopy for stone treatment.     FINDINGS  No meatal stenosis  No urethral strictures  Surgically absent  Bilateral ureteral orifices in orthotopic positions  Bladder lesions: None  Bladder stones: None  Trabeculations: none  Diverticula: None   right distal and mid ureteroscopy with semirigid ureteroscope did not demonstrate any stone fragments after removal of the single right ureteral stone. Atraumatic stone removal, therefore decision made to omit stent placement.       PROCEDURES IN DETAIL   Patient identified in the preop holding area.  Consent was obtained.  Risks and benefits of the procedure were explained to the patient.  Patient was in agreement.  Patient was brought back to the OR and placed upon the table.  Bilateral lower extremity SCDs  were placed and turned on.  Patient received IV antibiotic prophylaxis.  Patient underwent smooth induction of anesthesia and was positioned in dorsal lithotomy. The genitalia were prepped and draped in sterile fashion.  A standard pre-procedural timeout was performed.     Case began with insertion of 21 Greenlandic rigid cystoscope.  Pan cystourethroscopy was performed.  See the above findings for details.     fluoroscopic images were obtained. The stone was visible in the expected location in the distal ureter.    Attention was turned toward the right  ureteral orifice.      A 5Fr catheter was advanced into the ureteral orifice and a guidewire was then passed through the catheter until a coil was visualized in the renal pelvis.     A semi-rigid ureteroscope was advanced alongside the wire.     The stone was encountered in the distal ureter..     The stone was basket extracted.     The scope was replaced and distal/mid ureteroscopy was performed which showed no additional stone fragments and widely patent ureter with no injuries.     The ureteroscope was then removed from the bladder and urethra. The wire was removed     Patient was uneventfully extubated and brought to PACU in stable condition.     I, Rivas Maxwell MD, performed the entire procedure as the primary attending surgeon.    PLAN:    Patient ok for discharge if comfortable. Some right flank fullness and pain may be expected for up to a day since no stent was placed    Patient should follow up with urology in 2 months with a renal ultrasound to be completed in 6-8 weeks' time.       Rivas Maxwell MD   [facility]   [facility]

## 2025-01-30 NOTE — DISCHARGE INSTR - AVS FIRST PAGE
Dear Mr. Mosley,    Your ureteroscopy surgery went well. I was able to treat your stone and remove all significant fragments. You may pass some small stone fragments and dust over the next few days. This is normal.    Some bloody urine is quite normal for up to 2 weeks after surgery.  If the urine his bloody but clear at this is not concerning.  However if it is bloody and thick like ketchup this is concerning and you should let us know.    Feelings of having to urinate more often with urgency and having bladder spasms is very common after this kind surgery as it is your bladder's way of reacting to the surgery.    If you are having difficulty voiding please let us know because sometimes there can be difficulty voiding from surgery.    We will arrange follow up in 2 months with an ultrasound of the kidneys to be done in about 6 weeks.     Please call us if you have any questions or concerns, [phone].    Rivas Kaminski MD  [facility]   [facility]: [phone]

## 2025-01-30 NOTE — ED PROVIDER NOTES
Time reflects when diagnosis was documented in both MDM as applicable and the Disposition within this note       Time User Action Codes Description Comment    1/30/2025  1:52 AM Gris, Jacobo Add [N20.0] Kidney stone     1/30/2025  1:52 AM Gris, Jacobo Hawley [R10.9] Right flank pain     1/30/2025  1:52 AM Gris, Jacobo Add [N20.0] Nephrolithiasis     1/30/2025  1:52 AM [place], Jacobo Add [N13.30] Hydronephrosis           ED Disposition       ED Disposition   Admit    Condition   Stable    Date/Time   u Jan 30, 2025  1:52 AM    Comment   Case was discussed with roman and the patient's admission status was agreed to be Admission Status: observation status to the service of Dr. Sun.               Assessment & Plan       Medical Decision Making  Will obtain repeat imaging in addition to urine studies and basic labs look for any end organ damage.  Will also give symptomatic treatment.  In addition to fluids.    Urine does not reveal any concern for infected stone however patient does have an JT and CT does reveal worsening hydronephrosis on the right.  Given the JT and the worsening hydronephrosis with a stone that has not passed after a week will admit for urology and continued treatment of his JT.    Amount and/or Complexity of Data Reviewed  Labs:  Decision-making details documented in ED Course.  Radiology: ordered and independent interpretation performed.    Risk  Prescription drug management.  Decision regarding hospitalization.        ED Course as of 01/30/25 0332   Wed Jan 29, 2025   2310 Creatinine(!): 1.50       Medications   sodium chloride 0.9 % infusion (125 mL/hr Intravenous New Bag 1/30/25 0210)   acetaminophen (TYLENOL) tablet 650 mg (has no administration in time range)   ondansetron (ZOFRAN) injection 4 mg (has no administration in time range)   heparin (porcine) subcutaneous injection 5,000 Units (has no administration in time range)   oxyCODONE (ROXICODONE) IR tablet 5 mg (has no administration in  time range)   HYDROmorphone (DILAUDID) injection 0.5 mg (has no administration in time range)   ezetimibe (ZETIA) tablet 10 mg (has no administration in time range)   atorvastatin (LIPITOR) tablet 40 mg (has no administration in time range)   tamsulosin (FLOMAX) capsule 0.4 mg (has no administration in time range)   ketorolac (TORADOL) injection 15 mg (15 mg Intravenous Given 1/29/25 2303)   ondansetron (ZOFRAN) injection 4 mg (4 mg Intravenous Given 1/29/25 2303)   multi-electrolyte (ISOLYTE-S PH 7.4) bolus 1,000 mL (0 mL Intravenous Stopped 1/30/25 0146)       ED Risk Strat Scores                          SBIRT 22yo+      Flowsheet Row Most Recent Value   Initial Alcohol Screen: US AUDIT-C     1. How often do you have a drink containing alcohol? 0 Filed at: 01/29/2025 2153   2. How many drinks containing alcohol do you have on a typical day you are drinking?  0 Filed at: 01/29/2025 2153   3a. Male UNDER 65: How often do you have five or more drinks on one occasion? 0 Filed at: 01/29/2025 2153   3b. FEMALE Any Age, or MALE 65+: How often do you have 4 or more drinks on one occassion? 0 Filed at: 01/29/2025 2153   Audit-C Score 0 Filed at: 01/29/2025 2153   JAYME: How many times in the past year have you...    Used an illegal drug or used a prescription medication for non-medical reasons? Never Filed at: 01/29/2025 2153                            History of Present Illness       Chief Complaint   Patient presents with    Flank Pain     Patient reports worsening right flank pain for the past week. Was seen here last week for a kidney stone and was told it was small enough to pass. States pain is so bad that he is unable to eat/drink and no pain medications are helping.+nausea/chills.        Past Medical History:   Diagnosis Date    Abnormal blood chemistry     Allergic     Allergic rhinitis     last assessed 08/22/2014    Anxiety     Cancer (HCC)     Elevated PSA     Erectile dysfunction of non-organic origin      Hyperglycemia     Last Assessed: 1/20/2016     Hyperlipidemia     Kidney stone     Paresthesia of foot     last assessed 08/14/2015      Past Surgical History:   Procedure Laterality Date    COLONOSCOPY      FOOT SURGERY      HAND SURGERY Left     HERNIA REPAIR      INGUINAL HERNIA REPAIR      KNEE SURGERY      MD COLONOSCOPY FLX DX W/COLLJ SPEC WHEN PFRMD N/A 01/10/2017    Procedure: COLONOSCOPY;  Surgeon: Car Barbour MD;  Location: [facility];  Service: Gastroenterology    MD CYSTO/URETERO W/LITHOTRIPSY &INDWELL STENT INSRT Left 02/01/2018    Procedure: CYSTOSCOPY, LEFT URETEROSCOPY, RETROGRADE PYELOGRAM;  Surgeon: James Whaley MD;  Location: [facility] MAIN OR;  Service: Urology    PROSTATE BIOPSY  12/27/2019    PROSTATE SURGERY        Family History   Problem Relation Age of Onset    Lung cancer Mother     Cancer Mother     Lung cancer Father     Cancer Father       Social History     Tobacco Use    Smoking status: Never    Smokeless tobacco: Never   Vaping Use    Vaping status: Never Used   Substance Use Topics    Alcohol use: Yes     Comment: occasional/social alcohol use     Drug use: No      E-Cigarette/Vaping    E-Cigarette Use Never User       E-Cigarette/Vaping Substances    Nicotine No     THC No     CBD No     Flavoring No     Other No     Unknown No       I have reviewed and agree with the history as documented.     64-year-old male past medical history of hyperlipidemia, elevated PSA, recent kidney stone diagnosis 1 week ago presenting the emergency department for increasing right flank pain.  He states that he was seen at urology today and mention to them that he has not passed the kidney stone and they said to wait as it has a high likelihood of passing on its own given the size.  He states that he is not able to take the pain he has had decreased p.o. intake and nausea and has been using oxycodone at home without relief.  He says the pain is in the same position has not changed.  He says it is in  his back radiates to his side.  Complaining of some constipation he denies any other symptoms denies any fever, chills, chest pain, shortness of breath, vomiting, diarrhea.       Flank Pain      Review of Systems   Genitourinary:  Positive for flank pain.           Objective       ED Triage Vitals [01/29/25 2152]   Temperature Pulse Blood Pressure Respirations SpO2 Patient Position - Orthostatic VS   99.2 °F (37.3 °C) 95 149/90 20 97 % Sitting      Temp Source Heart Rate Source BP Location FiO2 (%) Pain Score    Temporal Monitor Left arm -- 9      Vitals      Date and Time Temp Pulse SpO2 Resp BP Pain Score FACES Pain Rating User   01/30/25 0315 -- 68 94 % 18 116/66 -- -- PT   01/30/25 0215 -- 80 95 % 19 135/74 -- -- PT   01/29/25 2303 -- -- -- -- -- 7 -- PT   01/29/25 2152 99.2 °F (37.3 °C) 95 97 % 20 149/90 9 -- OO            Physical Exam  Vitals and nursing note reviewed.   Constitutional:       Appearance: Normal appearance. He is well-developed.      Comments: In visible pain   HENT:      Head: Normocephalic and atraumatic.      Nose: Nose normal.      Mouth/Throat:      Mouth: Mucous membranes are moist.      Pharynx: No oropharyngeal exudate or posterior oropharyngeal erythema.   Eyes:      Extraocular Movements: Extraocular movements intact.      Conjunctiva/sclera: Conjunctivae normal.      Pupils: Pupils are equal, round, and reactive to light.   Cardiovascular:      Rate and Rhythm: Normal rate and regular rhythm.      Pulses: Normal pulses.      Heart sounds: Normal heart sounds.   Pulmonary:      Effort: Pulmonary effort is normal.      Breath sounds: Normal breath sounds.   Abdominal:      General: Bowel sounds are normal. There is no distension.      Palpations: Abdomen is soft.      Tenderness: There is no abdominal tenderness. There is right CVA tenderness. There is no left CVA tenderness, guarding or rebound.   Musculoskeletal:         General: Normal range of motion.      Cervical back: Normal  range of motion and neck supple.      Right lower leg: No edema.      Left lower leg: No edema.   Skin:     General: Skin is warm and dry.      Capillary Refill: Capillary refill takes less than 2 seconds.   Neurological:      General: No focal deficit present.      Mental Status: He is alert and oriented to person, place, and time.      Cranial Nerves: No cranial nerve deficit.   Psychiatric:         Mood and Affect: Mood normal.         Behavior: Behavior normal.         Results Reviewed       Procedure Component Value Units Date/Time    Comprehensive metabolic panel [[id number]]  (Abnormal) Collected: 01/29/25 2218    Lab Status: Final result Specimen: Blood from Arm, Left Updated: 01/29/25 2308     Sodium 135 mmol/L      Potassium 3.9 mmol/L      Chloride 97 mmol/L      CO2 27 mmol/L      ANION GAP 11 mmol/L      BUN 31 mg/dL      Creatinine 1.50 mg/dL      Glucose 104 mg/dL      Calcium 9.5 mg/dL      AST 26 U/L      ALT 28 U/L      Alkaline Phosphatase 43 U/L      Total Protein 8.0 g/dL      Albumin 4.2 g/dL      Total Bilirubin 0.60 mg/dL      eGFR 47 ml/min/1.73sq m     Narrative:      National Kidney Disease Foundation guidelines for Chronic Kidney Disease (CKD):     Stage 1 with normal or high GFR (GFR > 90 mL/min/1.73 square meters)    Stage 2 Mild CKD (GFR = 60-89 mL/min/1.73 square meters)    Stage 3A Moderate CKD (GFR = 45-59 mL/min/1.73 square meters)    Stage 3B Moderate CKD (GFR = 30-44 mL/min/1.73 square meters)    Stage 4 Severe CKD (GFR = 15-29 mL/min/1.73 square meters)    Stage 5 End Stage CKD (GFR <15 mL/min/1.73 square meters)  Note: GFR calculation is accurate only with a steady state creatinine    Urine Microscopic [[id number]]  (Abnormal) Collected: 01/29/25 2218    Lab Status: Final result Specimen: Urine, Clean Catch Updated: 01/29/25 2242     RBC, UA None Seen /hpf      WBC, UA 1-2 /hpf      Epithelial Cells None Seen /hpf      Bacteria, UA None Seen /hpf      MUCUS THREADS Occasional     CBC and differential [[id number]]  (Abnormal) Collected: 01/29/25 2218    Lab Status: Final result Specimen: Blood from Arm, Left Updated: 01/29/25 2238     WBC 7.49 Thousand/uL      RBC 4.77 Million/uL      Hemoglobin 14.2 g/dL      Hematocrit 43.4 %      MCV 91 fL      MCH 29.8 pg      MCHC 32.7 g/dL      RDW 12.5 %      MPV 9.8 fL      Platelets 233 Thousands/uL      nRBC 0 /100 WBCs      Segmented % 79 %      Immature Grans % 0 %      Lymphocytes % 8 %      Monocytes % 10 %      Eosinophils Relative 2 %      Basophils Relative 1 %      Absolute Neutrophils 5.91 Thousands/µL      Absolute Immature Grans 0.02 Thousand/uL      Absolute Lymphocytes 0.63 Thousands/µL      Absolute Monocytes 0.78 Thousand/µL      Eosinophils Absolute 0.11 Thousand/µL      Basophils Absolute 0.04 Thousands/µL     UA w Reflex to Microscopic w Reflex to Culture [[id number]]  (Abnormal) Collected: 01/29/25 2218    Lab Status: Final result Specimen: Urine, Clean Catch Updated: 01/29/25 2237     Color, UA Yellow     Clarity, UA Clear     Specific Gravity, UA 1.020     pH, UA 5.5     Leukocytes, UA Negative     Nitrite, UA Negative     Protein, UA Trace mg/dl      Glucose, UA Negative mg/dl      Ketones, UA Negative mg/dl      Urobilinogen, UA <2.0 mg/dl      Bilirubin, UA Negative     Occult Blood, UA Negative            CT renal stone study abdomen pelvis wo contrast   ED Interpretation by Jacobo Ladnry MD (01/29 2310)   Nephrolithiasis of the right UVJ with increasing hydronephrosis from prior CT, also increasing fat stranding around the right kidney.      Final Interpretation by Marquez Subramanian DO (01/30 0143)      Worsened right hydronephrosis, now moderate, secondary to an obstructing 0.3 cm calculus at the ureterovesicular junction      The study was marked in EPIC for immediate notification.      Workstation performed: [id number]             Procedures    ED Medication and Procedure Management   Prior to Admission Medications    Prescriptions Last Dose Informant Patient Reported? Taking?   DAILY MULTIPLE VITAMINS/IRON PO  Self Yes No   Sig: Take 1 tablet by mouth daily   ascorbic acid (VITAMIN C) 500 mg tablet  Self Yes No   Sig: Take 1,000 mg by mouth daily   ezetimibe (ZETIA) 10 mg tablet   No No   Sig: Take 1 tablet (10 mg total) by mouth daily   oxyCODONE (Roxicodone) 5 immediate release tablet   No No   Sig: Take 1 tablet (5 mg total) by mouth every 4 (four) hours as needed for moderate pain for up to 12 doses Max Daily Amount: 30 mg   rosuvastatin (CRESTOR) 20 MG tablet   No No   Sig: TAKE 1 TABLET BY MOUTH EVERY DAY   tadalafil (CIALIS) 5 MG tablet  Self No No   Si tab daily   tamsulosin (FLOMAX) 0.4 mg   No No   Sig: Take 1 capsule (0.4 mg total) by mouth daily with dinner      Facility-Administered Medications: None     Patient's Medications   Discharge Prescriptions    No medications on file     No discharge procedures on file.  ED SEPSIS DOCUMENTATION   Time reflects when diagnosis was documented in both MDM as applicable and the Disposition within this note       Time User Action Codes Description Comment    2025  1:52 AM Jacobo Landry [N20.0] Kidney stone     2025  1:52 AM Jacobo Landry [R10.9] Right flank pain     2025  1:52 AM Jacobo Landry [N20.0] Nephrolithiasis     2025  1:52 AM Jacobo Landry [N13.30] Hydronephrosis                  Jacobo Landry MD  [phone]

## 2025-01-30 NOTE — TELEPHONE ENCOUNTER
Patient has u/s in system should have completed 3/16/25 per Dr Maxwell note. will schedule with Provider after - Patient tentatively schedul 3/25 @ 9:20

## 2025-01-30 NOTE — PROGRESS NOTES
"Progress Note - Hospitalist   Name: Wayne Mosley 64 y.o. male I MRN: [id number]  Unit/Bed#: CW2 218-02 I Date of Admission: 1/29/2025   Date of Service: 1/30/2025 I Hospital Day: 0    Assessment & Plan  Ureterolithiasis  64-year-old male who was recently diagnosed with kidney stone approximately 3 mm in the right distal ureter approximately 1 week ago causing mild hydronephrosis presented back to the emergency department with worsening abdominal pain and poor appetite  CT scan revealed \"Worsened right hydronephrosis, now moderate, secondary to an obstructing 0.3 cm calculus at the ureterovesicular junction \"  Also with notable acute kidney injury  IV fluids  Pain control  Flomax  S/p Right ureteroscopy with basket extraction of stone by urology this AM. Patient states he is able to void. Pain controlled at this time but he states it was uncontrolled overnight and he is concerned pain won't be controlled on PO meds alone, he wants to stay in hospital overnight.   BPH with obstruction/lower urinary tract symptoms  Continue Flomax  Hyperlipidemia  Continue statin and Zetia  JT (acute kidney injury) (HCC)  Creat up to 1.50  Continue IVF  Recheck labs in AM  Avoid nephrotoxins    VTE Pharmacologic Prophylaxis: low risk, encourage ambulation      Patient Centered Rounds: I performed bedside rounds with nursing staff today.   Discussions with Specialists or Other Care Team Provider: reviewed urology notes    Education and Discussions with Family / Patient: Patient declined call to .     Current Length of Stay: 0 day(s)  Current Patient Status: Inpatient   Certification Statement: The patient will continue to require additional inpatient hospital stay due to continue IVF, pain meds  Discharge Plan: Anticipate discharge tomorrow to home.    Code Status: Level 1 - Full Code    Subjective   Patient was admitted overnight with kidney stone and was taken to the OR earlier today. Pain is controlled at this " time but he feels like he still needs IV pain meds. He has been able to void    Objective :  Temp:  [98.3 °F (36.8 °C)-99.2 °F (37.3 °C)] 99.1 °F (37.3 °C)  HR:  [66-95] 79  BP: (104-149)/(64-98) 104/64  Resp:  [11-21] 16  SpO2:  [92 %-97 %] 94 %  O2 Device: None (Room air)    There is no height or weight on file to calculate BMI.     Input and Output Summary (last 24 hours):     Intake/Output Summary (Last 24 hours) at 1/30/2025 1529  Last data filed at 1/30/2025 1418  Gross per 24 hour   Intake --   Output 550 ml   Net -550 ml       Physical Exam  Vitals reviewed.   Constitutional:       General: He is not in acute distress.     Appearance: He is not ill-appearing or diaphoretic.   HENT:      Head: Normocephalic and atraumatic.      Nose: Nose normal.      Mouth/Throat:      Pharynx: Oropharynx is clear.   Cardiovascular:      Rate and Rhythm: Normal rate.   Pulmonary:      Effort: Pulmonary effort is normal. No respiratory distress.   Abdominal:      General: There is no distension.      Palpations: Abdomen is soft.      Tenderness: There is no abdominal tenderness.   Musculoskeletal:         General: No deformity or signs of injury.   Skin:     General: Skin is warm and dry.   Neurological:      Mental Status: He is alert and oriented to person, place, and time.                     Lab Results: I have reviewed the following results:   Results from last 7 days   Lab Units 01/29/25  2218   WBC Thousand/uL 7.49   HEMOGLOBIN g/dL 14.2   HEMATOCRIT % 43.4   PLATELETS Thousands/uL 233   SEGS PCT % 79*   LYMPHO PCT % 8*   MONO PCT % 10   EOS PCT % 2     Results from last 7 days   Lab Units 01/29/25  2218   SODIUM mmol/L 135   POTASSIUM mmol/L 3.9   CHLORIDE mmol/L 97   CO2 mmol/L 27   BUN mg/dL 31*   CREATININE mg/dL 1.50*   ANION GAP mmol/L 11   CALCIUM mg/dL 9.5   ALBUMIN g/dL 4.2   TOTAL BILIRUBIN mg/dL 0.60   ALK PHOS U/L 43   ALT U/L 28   AST U/L 26   GLUCOSE RANDOM mg/dL 104                           Last 24  Hours Medication List:     Current Facility-Administered Medications:     acetaminophen (TYLENOL) tablet 650 mg, Q6H PRN    atorvastatin (LIPITOR) tablet 40 mg, Daily With Dinner    ezetimibe (ZETIA) tablet 10 mg, Daily    heparin (porcine) subcutaneous injection 5,000 Units, Q8H SHIVAM    HYDROmorphone (DILAUDID) injection 0.5 mg, Q4H PRN    ondansetron (ZOFRAN) injection 4 mg, Q6H PRN    oxyCODONE (ROXICODONE) IR tablet 5 mg, Q6H PRN    sodium chloride 0.9 % infusion, Continuous, Last Rate: 125 mL/hr (01/30/25 1344)    tamsulosin (FLOMAX) capsule 0.4 mg, Daily With Dinner    Administrative Statements   Today, Patient Was Seen By: Corrie Huitron PA-C      **Please Note: This note may have been constructed using a voice recognition system.**

## 2025-01-30 NOTE — ASSESSMENT & PLAN NOTE
"64-year-old male who was recently diagnosed with kidney stone approximately 3 mm in the right distal ureter approximately 1 week ago causing mild hydronephrosis presented back to the emergency department with worsening abdominal pain and poor appetite  CT scan revealed \"Worsened right hydronephrosis, now moderate, secondary to an obstructing 0.3 cm calculus at the ureterovesicular junction \"  Also with notable acute kidney injury  IV fluids  Pain control  Flomax  S/p Right ureteroscopy with basket extraction of stone by urology this AM. Patient states he is able to void. Pain controlled at this time but he states it was uncontrolled overnight and he is concerned pain won't be controlled on PO meds alone, he wants to stay in hospital overnight.   "

## 2025-01-30 NOTE — ED ATTENDING ATTESTATION
Final Diagnoses:     1. Kidney stone    2. Right flank pain    3. Nephrolithiasis    4. Hydronephrosis      ED Course as of 01/30/25 2230 Wed Jan 29, 2025 2249 Bacteria, UA: None Seen   2249 Leukocytes, UA: Negative   2249 Nitrite, UA: Negative   2249 WBC: 7.49   2249 Hemoglobin: 14.2   2249 Platelet Count: 233       I, Chris Bridges MD, saw and evaluated the patient. All available labs and X-rays were ordered by me or the resident / non-physician and have been reviewed by myself. I discussed the patient with the resident / non-physician and agree with the resident's / non-physician practitioner's findings and plan as documented in the resident's / non-physician practicitioner's note, except where noted.   At this point, I agree with the current assessment done in the ED.   I was present during key portions of all procedures performed unless otherwise stated.     HPI:  NURSING TRIAGE:    This is a 64 y.o. male presenting for evaluation of RIGHT flank pain.   It's 3mm RIGHT side  Pain not moved/changed locations.   Continued to have severe pain since his last ER visit  Pain is gettign worse.   And he can't do another week of this pain.   Weightloss due to poor oral intake / nausea.   Burning with urinating without blood.     CT AP (renal) 1/22:   IMPRESSION:   Right distal ureteral calculus, 3 mm resulting in right-sided hydroureteronephrosis and delayed right renal nephrogram and accounting for the patient's acute symptoms.   Postsurgical changes of prostatectomy. No findings to suggest complication of treatment. No CT evidence to suggest recurrent or metastatic tumor in the abdomen or pelvis.   Sliding-type hiatal hernia. Chief Complaint   Patient presents with    Flank Pain     Patient reports worsening right flank pain for the past week. Was seen here last week for a kidney stone and was told it was small enough to pass. States pain is so bad that he is unable to eat/drink and no pain medications are  "helping.+nausea/chills.       PHYSICAL: ASSESSMENT + PLAN:   Pertinent: RIGHT CVAT  No anterior abdominal tenderness  N otachycardia  Mildly dry MM.    General: VS reviewed  Appears in NAD  awake, alert.   Well-nourished, well-developed. Appears stated age.   Speaking normally in full sentences.   Head: Normocephalic, atraumatic  Eyes: EOM-I. No diplopia.   No hyphema.   No subconjunctival hemorrhages.  Symmetrical lids.   ENT: Atraumatic external nose and ears.    MMM  No malocclusion. No stridor. Normal phonation. No drooling. Normal swallowing.   Neck: No JVD.  CV: No pallor noted  Lungs:   No tachypnea  No respiratory distress  Abd: soft nt nd no rebound/guarding  MSK:   FROM spontaneously  Skin: Dry, intact.   Neuro: Awake, alert, GCS15, CN II-XII grossly intact.   Motor grossly intact.  Psychiatric/Behavioral: interacting normally; appropriate mood/affect.    Exam: deferred    Vitals:    01/30/25 1537 01/30/25 1539 01/30/25 2138 01/30/25 2153   BP:   96/58 100/59   BP Location:       Pulse:   66 64   Resp:   17    Temp:   98.1 °F (36.7 °C)    TempSrc: Oral      SpO2:   95% 96%   Weight:  67.6 kg (149 lb)     Height:  5' 10\" (1.778 m)      - given the presentation, will check CBC for marked leukocytosis  - CMP for liver enzyme elevation that could signal cholecystitis, biliary obstructive disease. Check RFTs for JT / markers of dehydration.  - Lipase given abdominal pain to evaluate specifically for pancreatitis.  - Urine: will check for UTI or signs of pyelonephritis.   - Lastly, will consider abdominal imaging.  - Renal stone protocol CT: highest on my differential is a stone, will do imaging particularly for this looking for hydro or the stone itself.   - Disposition per workup.        There are no obvious limitations to social determinants of care.   Nursing note reviewed.   Vitals reviewed.   Orders placed by myself and/or advanced practitioner / resident.    Previous chart was reviewed  History " obtained from: Patient  Language barrier: None  Limitations to the history obtained: None    Past Medical: Past Surgical:    has a past medical history of Abnormal blood chemistry, Allergic, Allergic rhinitis, Anxiety, Cancer (HCC), Elevated PSA, Erectile dysfunction of non-organic origin, Hyperglycemia, Hyperlipidemia, Kidney stone, and Paresthesia of foot.  has a past surgical history that includes Foot surgery; Hernia repair; pr colonoscopy flx dx w/collj spec when pfrmd (N/A, 01/10/2017); Hand surgery (Left); pr cysto/uretero w/lithotripsy &indwell stent insrt (Left, 02/01/2018); Inguinal hernia repair; Prostate biopsy (12/27/2019); Colonoscopy; Knee surgery; and Prostate surgery.   Social: Cardiac (Echo/Cath)   Social History     Substance and Sexual Activity   Alcohol Use Yes    Comment: occasional/social alcohol use      Social History     Tobacco Use   Smoking Status Never    Passive exposure: Never   Smokeless Tobacco Never     Social History     Substance and Sexual Activity   Drug Use No    No results found for this or any previous visit.    No results found for this or any previous visit.    No results found for this or any previous visit.     Labs: Imaging:   Labs Reviewed   CBC AND DIFFERENTIAL - Abnormal       Result Value Ref Range Status    WBC 7.49  4.31 - 10.16 Thousand/uL Final    RBC 4.77  3.88 - 5.62 Million/uL Final    Hemoglobin 14.2  12.0 - 17.0 g/dL Final    Hematocrit 43.4  36.5 - 49.3 % Final    MCV 91  82 - 98 fL Final    MCH 29.8  26.8 - 34.3 pg Final    MCHC 32.7  31.4 - 37.4 g/dL Final    RDW 12.5  11.6 - 15.1 % Final    MPV 9.8  8.9 - 12.7 fL Final    Platelets 233  149 - 390 Thousands/uL Final    nRBC 0  /100 WBCs Final    Segmented % 79 (*) 43 - 75 % Final    Immature Grans % 0  0 - 2 % Final    Lymphocytes % 8 (*) 14 - 44 % Final    Monocytes % 10  4 - 12 % Final    Eosinophils Relative 2  0 - 6 % Final    Basophils Relative 1  0 - 1 % Final    Absolute Neutrophils 5.91  1.85 -  7.62 Thousands/µL Final    Absolute Immature Grans 0.02  0.00 - 0.20 Thousand/uL Final    Absolute Lymphocytes 0.63  0.60 - 4.47 Thousands/µL Final    Absolute Monocytes 0.78  0.17 - 1.22 Thousand/µL Final    Eosinophils Absolute 0.11  0.00 - 0.61 Thousand/µL Final    Basophils Absolute 0.04  0.00 - 0.10 Thousands/µL Final   COMPREHENSIVE METABOLIC PANEL - Abnormal    Sodium 135  135 - 147 mmol/L Final    Potassium 3.9  3.5 - 5.3 mmol/L Final    Chloride 97  96 - 108 mmol/L Final    CO2 27  21 - 32 mmol/L Final    ANION GAP 11  4 - 13 mmol/L Final    BUN 31 (*) 5 - 25 mg/dL Final    Creatinine 1.50 (*) 0.60 - 1.30 mg/dL Final    Comment: Standardized to IDMS reference method    Glucose 104  65 - 140 mg/dL Final    Comment: If the patient is fasting, the ADA then defines impaired fasting glucose as > 100 mg/dL and diabetes as > or equal to 123 mg/dL.    Calcium 9.5  8.4 - 10.2 mg/dL Final    AST 26  13 - 39 U/L Final    ALT 28  7 - 52 U/L Final    Comment: Specimen collection should occur prior to Sulfasalazine administration due to the potential for falsely depressed results.     Alkaline Phosphatase 43  34 - 104 U/L Final    Total Protein 8.0  6.4 - 8.4 g/dL Final    Albumin 4.2  3.5 - 5.0 g/dL Final    Total Bilirubin 0.60  0.20 - 1.00 mg/dL Final    Comment: Use of this assay is not recommended for patients undergoing treatment with eltrombopag due to the potential for falsely elevated results.  N-acetyl-p-benzoquinone imine (metabolite of Acetaminophen) will generate erroneously low results in samples for patients that have taken an overdose of Acetaminophen.    eGFR 47  ml/min/1.73sq m Final    Narrative:     National Kidney Disease Foundation guidelines for Chronic Kidney Disease (CKD):     Stage 1 with normal or high GFR (GFR > 90 mL/min/1.73 square meters)    Stage 2 Mild CKD (GFR = 60-89 mL/min/1.73 square meters)    Stage 3A Moderate CKD (GFR = 45-59 mL/min/1.73 square meters)    Stage 3B Moderate CKD (GFR  = 30-44 mL/min/1.73 square meters)    Stage 4 Severe CKD (GFR = 15-29 mL/min/1.73 square meters)    Stage 5 End Stage CKD (GFR <15 mL/min/1.73 square meters)  Note: GFR calculation is accurate only with a steady state creatinine   UA W REFLEX TO MICROSCOPIC WITH REFLEX TO CULTURE - Abnormal    Color, UA Yellow   Final    Clarity, UA Clear   Final    Specific Gravity, UA 1.020  1.003 - 1.030 Final    pH, UA 5.5  4.5, 5.0, 5.5, 6.0, 6.5, 7.0, 7.5, 8.0 Final    Leukocytes, UA Negative  Negative Final    Nitrite, UA Negative  Negative Final    Protein, UA Trace (*) Negative mg/dl Final    Glucose, UA Negative  Negative mg/dl Final    Ketones, UA Negative  Negative mg/dl Final    Urobilinogen, UA <2.0  <2.0 mg/dl mg/dl Final    Bilirubin, UA Negative  Negative Final    Occult Blood, UA Negative  Negative Final   URINE MICROSCOPIC - Abnormal    RBC, UA None Seen  None Seen, 1-2 /hpf Final    WBC, UA 1-2  None Seen, 1-2 /hpf Final    Epithelial Cells None Seen  None Seen, Occasional /hpf Final    Bacteria, UA None Seen  None Seen, Occasional /hpf Final    MUCUS THREADS Occasional (*) None Seen Final    XR abdomen 1 view kub   Final Result      Fluoroscopic guidance provided for procedure guidance.  Please refer to the separate procedure notes for additional details.         Workstation performed: [id number]         CT renal stone study abdomen pelvis wo contrast   ED Interpretation   Nephrolithiasis of the right UVJ with increasing hydronephrosis from prior CT, also increasing fat stranding around the right kidney.      Final Result      Worsened right hydronephrosis, now moderate, secondary to an obstructing 0.3 cm calculus at the ureterovesicular junction      The study was marked in EPIC for immediate notification.      Workstation performed: [id number]         US kidney and bladder    (Results Pending)      Medications: Code Status:   Medications   sodium chloride 0.9 % infusion (125 mL/hr Intravenous New Bag 1/30/25  2045)   acetaminophen (TYLENOL) tablet 650 mg (650 mg Oral Given 1/30/25 1546)   ondansetron (ZOFRAN) injection 4 mg (4 mg Intravenous Given 1/30/25 0759)   heparin (porcine) subcutaneous injection 5,000 Units (5,000 Units Subcutaneous Given 1/30/25 2045)   oxyCODONE (ROXICODONE) IR tablet 5 mg (5 mg Oral Given 1/30/25 2045)   HYDROmorphone (DILAUDID) injection 0.5 mg (0.5 mg Intravenous Given 1/30/25 0800)   ezetimibe (ZETIA) tablet 10 mg (10 mg Oral Given 1/30/25 0815)   atorvastatin (LIPITOR) tablet 40 mg (40 mg Oral Given 1/30/25 1546)   tamsulosin (FLOMAX) capsule 0.4 mg (0.4 mg Oral Given 1/30/25 1546)   ketorolac (TORADOL) injection 15 mg (15 mg Intravenous Given 1/29/25 2303)   ondansetron (ZOFRAN) injection 4 mg (4 mg Intravenous Given 1/29/25 2303)   multi-electrolyte (ISOLYTE-S PH 7.4) bolus 1,000 mL (0 mL Intravenous Stopped 1/30/25 0146)    Code Status: Level 1 - Full Code  Advance Directive and Living Will:      Power of :    POLST:       Orders Placed This Encounter   Procedures    CT renal stone study abdomen pelvis wo contrast    XR abdomen 1 view kub    US kidney and bladder    CBC and differential    Comprehensive metabolic panel    UA w Reflex to Microscopic w Reflex to Culture    Urine Microscopic    Basic metabolic panel    CBC and differential    Stone analysis    Diet Regular; Regular House    Insert peripheral IV    Insert peripheral IV    Nursing communication Continue IV as ordered.    Notify admitting physician    Notify admitting physician on arrival    Vital Signs per unit routine    I/O    Apply SCD or Foot pumps    Check temperature    Level 1-Full Code: all life saving measures are indicated    Inpatient consult to Urology    Place in Observation    INPATIENT ADMISSION     Time reflects when diagnosis was documented in both MDM as applicable and the Disposition within this note       Time User Action Codes Description Comment    1/30/2025  1:52 AM Jacobo Landry Add [N20.0]  Kidney stone     1/30/2025  1:52 AM Jacobo Landry Add [R10.9] Right flank pain     1/30/2025  1:52 AM Jacobo Landry Add [N20.0] Nephrolithiasis     1/30/2025  1:52 AM Gris, Jacobo Add [N13.30] Hydronephrosis     1/30/2025 11:42 AM Jaquan Escobar Modify [N20.0] Kidney stone           ED Disposition       ED Disposition   Admit    Condition   Stable    Date/Time   Thu Jan 30, 2025  1:52 AM    Comment   Case was discussed with roman and the patient's admission status was agreed to be Admission Status: observation status to the service of Dr. Sun.               Follow-up Information    None       Current Discharge Medication List        CONTINUE these medications which have NOT CHANGED    Details   ascorbic acid (VITAMIN C) 500 mg tablet Take 1,000 mg by mouth daily      DAILY MULTIPLE VITAMINS/IRON PO Take 1 tablet by mouth daily      ezetimibe (ZETIA) 10 mg tablet Take 1 tablet (10 mg total) by mouth daily  Qty: 90 tablet, Refills: 3    Associated Diagnoses: Mixed hyperlipidemia      oxyCODONE (Roxicodone) 5 immediate release tablet Take 1 tablet (5 mg total) by mouth every 4 (four) hours as needed for moderate pain for up to 12 doses Max Daily Amount: 30 mg  Qty: 12 tablet, Refills: 0    Associated Diagnoses: Ureterolithiasis      rosuvastatin (CRESTOR) 20 MG tablet TAKE 1 TABLET BY MOUTH EVERY DAY  Qty: 90 tablet, Refills: 1    Associated Diagnoses: Mixed hyperlipidemia      tadalafil (CIALIS) 5 MG tablet 1 tab daily  Qty: 90 tablet, Refills: 0    Associated Diagnoses: Erectile dysfunction, unspecified erectile dysfunction type      tamsulosin (FLOMAX) 0.4 mg Take 1 capsule (0.4 mg total) by mouth daily with dinner  Qty: 30 capsule, Refills: 0    Associated Diagnoses: Ureterolithiasis           Outpatient Discharge Orders   US kidney and bladder   Standing Status: Future Standing Exp. Date: 01/30/29     Prior to Admission Medications   Prescriptions Last Dose Informant Patient Reported? Taking?   DAILY  "MULTIPLE VITAMINS/IRON PO  Self Yes No   Sig: Take 1 tablet by mouth daily   ascorbic acid (VITAMIN C) 500 mg tablet  Self Yes No   Sig: Take 1,000 mg by mouth daily   ezetimibe (ZETIA) 10 mg tablet   No No   Sig: Take 1 tablet (10 mg total) by mouth daily   oxyCODONE (Roxicodone) 5 immediate release tablet   No No   Sig: Take 1 tablet (5 mg total) by mouth every 4 (four) hours as needed for moderate pain for up to 12 doses Max Daily Amount: 30 mg   rosuvastatin (CRESTOR) 20 MG tablet   No No   Sig: TAKE 1 TABLET BY MOUTH EVERY DAY   tadalafil (CIALIS) 5 MG tablet  Self No No   Si tab daily   tamsulosin (FLOMAX) 0.4 mg   No No   Sig: Take 1 capsule (0.4 mg total) by mouth daily with dinner      Facility-Administered Medications: None                        Portions of the record may have been created with voice recognition software. Occasional wrong word or \"sound a like\" substitutions may have occurred due to the inherent limitations of voice recognition software. Read the chart carefully and recognize, using context, where substitutions have occurred.    Electronically signed by:  Chris Bridges  "

## 2025-01-30 NOTE — PLAN OF CARE
Problem: PAIN - ADULT  Goal: Verbalizes/displays adequate comfort level or baseline comfort level  Description: Interventions:  - Encourage patient to monitor pain and request assistance  - Assess pain using appropriate pain scale  - Administer analgesics based on type and severity of pain and evaluate response  - Implement non-pharmacological measures as appropriate and evaluate response  - Consider cultural and social influences on pain and pain management  - Notify physician/advanced practitioner if interventions unsuccessful or patient reports new pain  Outcome: Progressing     Problem: INFECTION - ADULT  Goal: Absence or prevention of progression during hospitalization  Description: INTERVENTIONS:  - Assess and monitor for signs and symptoms of infection  - Monitor lab/diagnostic results  - Monitor all insertion sites, i.e. indwelling lines, tubes, and drains  - Monitor endotracheal if appropriate and nasal secretions for changes in amount and color  - [place]ott appropriate cooling/warming therapies per order  - Administer medications as ordered  - Instruct and encourage patient and family to use good hand hygiene technique  - Identify and instruct in appropriate isolation precautions for identified infection/condition  Outcome: Progressing  Goal: Absence of fever/infection during neutropenic period  Description: INTERVENTIONS:  - Monitor WBC    Outcome: Progressing     Problem: SAFETY ADULT  Goal: Patient will remain free of falls  Description: INTERVENTIONS:  - Educate patient/family on patient safety including physical limitations  - Instruct patient to call for assistance with activity   - Consult OT/PT to assist with strengthening/mobility   - Keep Call bell within reach  - Keep bed low and locked with side rails adjusted as appropriate  - Keep care items and personal belongings within reach  - Initiate and maintain comfort rounds  - Make Fall Risk Sign visible to staff  - Offer Toileting every 3 Hours,  in advance of need  - Initiate/Maintain bed alarm  - Obtain necessary fall risk management equipment: non slip socks   - Apply yellow socks and bracelet for high fall risk patients  - Consider moving patient to room near nurses station  Outcome: Progressing  Goal: Maintain or return to baseline ADL function  Description: INTERVENTIONS:  -  Assess patient's ability to carry out ADLs; assess patient's baseline for ADL function and identify physical deficits which impact ability to perform ADLs (bathing, care of mouth/teeth, toileting, grooming, dressing, etc.)  - Assess/evaluate cause of self-care deficits   - Assess range of motion  - Assess patient's mobility; develop plan if impaired  - Assess patient's need for assistive devices and provide as appropriate  - Encourage maximum independence but intervene and supervise when necessary  - Involve family in performance of ADLs  - Assess for home care needs following discharge   - Consider OT consult to assist with ADL evaluation and planning for discharge  - Provide patient education as appropriate  Outcome: Progressing  Goal: Maintains/Returns to pre admission functional level  Description: INTERVENTIONS:  - Perform AM-PAC 6 Click Basic Mobility/ Daily Activity assessment daily.  - Set and communicate daily mobility goal to care team and patient/family/caregiver.   - Collaborate with rehabilitation services on mobility goals if consulted  - Out of bed for toileting  - Record patient progress and toleration of activity level   Outcome: Progressing     Problem: DISCHARGE PLANNING  Goal: Discharge to home or other facility with appropriate resources  Description: INTERVENTIONS:  - Identify barriers to discharge w/patient and caregiver  - Arrange for needed discharge resources and transportation as appropriate  - Identify discharge learning needs (meds, wound care, etc.)  - Arrange for interpretive services to assist at discharge as needed  - Refer to Case Management  Department for coordinating discharge planning if the patient needs post-hospital services based on physician/advanced practitioner order or complex needs related to functional status, cognitive ability, or social support system  Outcome: Progressing     Problem: Knowledge Deficit  Goal: Patient/family/caregiver demonstrates understanding of disease process, treatment plan, medications, and discharge instructions  Description: Complete learning assessment and assess knowledge base.  Interventions:  - Provide teaching at level of understanding  - Provide teaching via preferred learning methods  Outcome: Progressing

## 2025-01-30 NOTE — ASSESSMENT & PLAN NOTE
"64-year-old male who was recently diagnosed with kidney stone approximately 3 mm in the right distal ureter approximately 1 week ago causing mild hydronephrosis presented back to the emergency department with worsening abdominal pain and poor appetite  CT scan revealed \"Worsened right hydronephrosis, now moderate, secondary to an obstructing 0.3 cm calculus at the ureterovesicular junction \"  Also with notable acute kidney injury  Suspect will likely need intervention  IV fluids  Pain control  Flomax  N.p.o. for now  Will consult urology for further evaluation  "

## 2025-01-31 ENCOUNTER — TRANSITIONAL CARE MANAGEMENT (OUTPATIENT)
Dept: FAMILY MEDICINE CLINIC | Facility: CLINIC | Age: 65
End: 2025-01-31

## 2025-01-31 VITALS
DIASTOLIC BLOOD PRESSURE: 69 MMHG | RESPIRATION RATE: 17 BRPM | HEART RATE: 80 BPM | SYSTOLIC BLOOD PRESSURE: 125 MMHG | OXYGEN SATURATION: 96 % | BODY MASS INDEX: 21.33 KG/M2 | TEMPERATURE: 98.4 F | WEIGHT: 149 LBS | HEIGHT: 70 IN

## 2025-01-31 LAB
ANION GAP SERPL CALCULATED.3IONS-SCNC: 5 MMOL/L (ref 4–13)
BASOPHILS # BLD AUTO: 0 THOUSANDS/ΜL (ref 0–0.1)
BASOPHILS NFR BLD AUTO: 0 % (ref 0–1)
BUN SERPL-MCNC: 23 MG/DL (ref 5–25)
CALCIUM SERPL-MCNC: 8.4 MG/DL (ref 8.4–10.2)
CHLORIDE SERPL-SCNC: 111 MMOL/L (ref 96–108)
CO2 SERPL-SCNC: 25 MMOL/L (ref 21–32)
CREAT SERPL-MCNC: 1.17 MG/DL (ref 0.6–1.3)
EOSINOPHIL # BLD AUTO: 0.01 THOUSAND/ΜL (ref 0–0.61)
EOSINOPHIL NFR BLD AUTO: 0 % (ref 0–6)
ERYTHROCYTE [DISTWIDTH] IN BLOOD BY AUTOMATED COUNT: 12.6 % (ref 11.6–15.1)
GFR SERPL CREATININE-BSD FRML MDRD: 64 ML/MIN/1.73SQ M
GLUCOSE SERPL-MCNC: 114 MG/DL (ref 65–140)
HCT VFR BLD AUTO: 36 % (ref 36.5–49.3)
HGB BLD-MCNC: 11.4 G/DL (ref 12–17)
IMM GRANULOCYTES # BLD AUTO: 0.02 THOUSAND/UL (ref 0–0.2)
IMM GRANULOCYTES NFR BLD AUTO: 0 % (ref 0–2)
LYMPHOCYTES # BLD AUTO: 0.34 THOUSANDS/ΜL (ref 0.6–4.47)
LYMPHOCYTES NFR BLD AUTO: 5 % (ref 14–44)
MCH RBC QN AUTO: 29.8 PG (ref 26.8–34.3)
MCHC RBC AUTO-ENTMCNC: 31.7 G/DL (ref 31.4–37.4)
MCV RBC AUTO: 94 FL (ref 82–98)
MONOCYTES # BLD AUTO: 0.58 THOUSAND/ΜL (ref 0.17–1.22)
MONOCYTES NFR BLD AUTO: 9 % (ref 4–12)
NEUTROPHILS # BLD AUTO: 5.39 THOUSANDS/ΜL (ref 1.85–7.62)
NEUTS SEG NFR BLD AUTO: 86 % (ref 43–75)
NRBC BLD AUTO-RTO: 0 /100 WBCS
PLATELET # BLD AUTO: 201 THOUSANDS/UL (ref 149–390)
PMV BLD AUTO: 9.9 FL (ref 8.9–12.7)
POTASSIUM SERPL-SCNC: 4.1 MMOL/L (ref 3.5–5.3)
RBC # BLD AUTO: 3.82 MILLION/UL (ref 3.88–5.62)
SODIUM SERPL-SCNC: 141 MMOL/L (ref 135–147)
WBC # BLD AUTO: 6.34 THOUSAND/UL (ref 4.31–10.16)

## 2025-01-31 PROCEDURE — 99239 HOSP IP/OBS DSCHRG MGMT >30: CPT | Performed by: PHYSICIAN ASSISTANT

## 2025-01-31 PROCEDURE — 80048 BASIC METABOLIC PNL TOTAL CA: CPT | Performed by: INTERNAL MEDICINE

## 2025-01-31 PROCEDURE — 85025 COMPLETE CBC W/AUTO DIFF WBC: CPT | Performed by: INTERNAL MEDICINE

## 2025-01-31 RX ORDER — AMOXICILLIN 250 MG
1 CAPSULE ORAL 2 TIMES DAILY
Qty: 60 TABLET | Refills: 0 | Status: SHIPPED | OUTPATIENT
Start: 2025-01-31

## 2025-01-31 RX ORDER — AMOXICILLIN 250 MG
1 CAPSULE ORAL 2 TIMES DAILY
Status: DISCONTINUED | OUTPATIENT
Start: 2025-01-31 | End: 2025-01-31 | Stop reason: HOSPADM

## 2025-01-31 RX ORDER — ACETAMINOPHEN 325 MG/1
650 TABLET ORAL EVERY 6 HOURS PRN
Start: 2025-01-31

## 2025-01-31 RX ORDER — OXYCODONE HYDROCHLORIDE 5 MG/1
5 TABLET ORAL EVERY 6 HOURS PRN
Start: 2025-01-31

## 2025-01-31 RX ADMIN — OXYCODONE HYDROCHLORIDE 5 MG: 5 TABLET ORAL at 04:31

## 2025-01-31 RX ADMIN — ACETAMINOPHEN 650 MG: 325 TABLET, FILM COATED ORAL at 10:06

## 2025-01-31 RX ADMIN — SODIUM CHLORIDE 125 ML/HR: 0.9 INJECTION, SOLUTION INTRAVENOUS at 04:33

## 2025-01-31 RX ADMIN — HEPARIN SODIUM 5000 UNITS: 5000 INJECTION, SOLUTION INTRAVENOUS; SUBCUTANEOUS at 05:41

## 2025-01-31 RX ADMIN — EZETIMIBE 10 MG: 10 TABLET ORAL at 09:08

## 2025-01-31 NOTE — ASSESSMENT & PLAN NOTE
Peaked at 1.50 yesterday  Resolved today, creat 1.17  Encourage PO hydration upon discharge  Avoid nephrotoxins

## 2025-01-31 NOTE — DISCHARGE SUMMARY
"Discharge Summary - Hospitalist   Name: Wayne Mosley 64 y.o. male I MRN: [id number]  Unit/Bed#: CW2 218-02 I Date of Admission: 1/29/2025   Date of Service: 1/31/2025 I Hospital Day: 1     Assessment & Plan  Ureterolithiasis  64-year-old male who was recently diagnosed with kidney stone approximately 3 mm in the right distal ureter approximately 1 week ago causing mild hydronephrosis presented back to the emergency department with worsening abdominal pain and poor appetite  CT scan revealed \"Worsened right hydronephrosis, now moderate, secondary to an obstructing 0.3 cm calculus at the ureterovesicular junction \"  Also with notable acute kidney injury  IV fluids  Pain control  Flomax  S/p Right ureteroscopy with basket extraction of stone by urology 1/30 AM. Patient able to void- urine clear yellow this AM. Pain controlled. Cleared by urology for discharge  Patient should follow up with urology in 2 months with a renal ultrasound to be completed in 6-8 weeks' time   Hyperlipidemia  Continue statin and Zetia  JT (acute kidney injury) (HCC)  Peaked at 1.50 yesterday  Resolved today, creat 1.17  Encourage PO hydration upon discharge  Avoid nephrotoxins  Prostate cancer (HCC)  Hx prostate CA s/p prostatectomy and radiation  Continue flomax     Discharging Physician / Practitioner: Corrie Huitron PA-C  PCP: Martinez Chen DO  Admission Date:   Admission Orders (From admission, onward)       Ordered        01/30/25 1518  INPATIENT ADMISSION  Once            01/30/25 0153  Place in Observation  Once                          Discharge Date: 01/31/25    Medical Problems       Resolved Problems  Date Reviewed: 10/17/2024   None         Consultations During Hospital Stay:  urology    Procedures Performed:   Cystoscopy   Right ureteroscopy with basket extraction of stone    Significant Findings / Test Results:   1/29- prior to procedure- Worsened right hydronephrosis, now moderate, secondary to an obstructing 0.3 cm " "calculus at the ureterovesicular junction     Incidental Findings:   none     Test Results Pending at Discharge (will require follow up):   none     Outpatient Tests Requested:  Renal US in 6-8 weeks then follow up with urology at 2 months    Complications:  none    Reason for Admission: kidney stone    Hospital Course:     Wayne Mosley is a 64 y.o. male patient who originally presented to the hospital on 1/29/2025 due to returned to ED with uncontrolled right flank pain. He had been seen in the ED the week before and was found to have a kidney stone. He states the oxycodone wasn't helping his pain  His creat did go up to 1.5 but normalized with IVF. VS remained stable. He was taken to the OR by urology on 1/30 for cysto and basket extraction of stone. He was cleared by urology for discharge and is being discharged home this AM        Please see above list of diagnoses and related plan for additional information.     Condition at Discharge: stable     Discharge Day Visit / Exam:     Subjective:  pain controlled  Vitals: Blood Pressure: 125/69 (01/31/25 0753)  Pulse: 80 (01/31/25 0753)  Temperature: 98.4 °F (36.9 °C) (01/31/25 0753)  Temp Source: Oral (01/30/25 1537)  Respirations: 17 (01/30/25 2138)  Height: 5' 10\" (177.8 cm) (01/30/25 1539)  Weight - Scale: 67.6 kg (149 lb) (01/30/25 1539)  SpO2: 96 % (01/31/25 0753)  Exam:   Physical Exam  Vitals reviewed.   Constitutional:       General: He is not in acute distress.     Appearance: He is not ill-appearing or diaphoretic.   HENT:      Head: Normocephalic and atraumatic.      Nose: Nose normal.      Mouth/Throat:      Pharynx: Oropharynx is clear.   Cardiovascular:      Rate and Rhythm: Normal rate.   Pulmonary:      Effort: Pulmonary effort is normal. No respiratory distress.   Abdominal:      General: There is no distension.      Palpations: Abdomen is soft.      Tenderness: There is no abdominal tenderness.   Musculoskeletal:         General: No deformity " or signs of injury.   Skin:     General: Skin is warm and dry.   Neurological:      Mental Status: He is alert and oriented to person, place, and time.         Discussion with Family: declined call to family    Discharge instructions/Information to patient and family:   See after visit summary for information provided to patient and family.      Provisions for Follow-Up Care:  See after visit summary for information related to follow-up care and any pertinent home health orders.      Disposition:     Home      Planned Readmission: no     Discharge Statement:  I spent 40 minutes discharging the patient. This time was spent on the day of discharge. I had direct contact with the patient on the day of discharge. Greater than 50% of the total time was spent examining patient, answering all patient questions, arranging and discussing plan of care with patient as well as directly providing post-discharge instructions.  Additional time then spent on discharge activities.    Discharge Medications:  See after visit summary for reconciled discharge medications provided to patient and family.      ** Please Note: This note has been constructed using a voice recognition system **

## 2025-01-31 NOTE — ASSESSMENT & PLAN NOTE
"64-year-old male who was recently diagnosed with kidney stone approximately 3 mm in the right distal ureter approximately 1 week ago causing mild hydronephrosis presented back to the emergency department with worsening abdominal pain and poor appetite  CT scan revealed \"Worsened right hydronephrosis, now moderate, secondary to an obstructing 0.3 cm calculus at the ureterovesicular junction \"  Also with notable acute kidney injury  IV fluids  Pain control  Flomax  S/p Right ureteroscopy with basket extraction of stone by urology 1/30 AM. Patient able to void- urine clear yellow this AM. Pain controlled. Cleared by urology for discharge  Patient should follow up with urology in 2 months with a renal ultrasound to be completed in 6-8 weeks' time   "

## 2025-02-03 ENCOUNTER — TELEPHONE (OUTPATIENT)
Dept: UROLOGY | Facility: AMBULATORY SURGERY CENTER | Age: 65
End: 2025-02-03

## 2025-02-03 ENCOUNTER — EVALUATION (OUTPATIENT)
Dept: PHYSICAL THERAPY | Facility: REHABILITATION | Age: 65
End: 2025-02-03
Payer: MEDICARE

## 2025-02-03 DIAGNOSIS — Z96.652 HISTORY OF ARTHROPLASTY OF LEFT KNEE: ICD-10-CM

## 2025-02-03 DIAGNOSIS — M25.562 ACUTE PAIN OF LEFT KNEE: Primary | ICD-10-CM

## 2025-02-03 PROCEDURE — 97112 NEUROMUSCULAR REEDUCATION: CPT | Performed by: PHYSICAL THERAPIST

## 2025-02-03 PROCEDURE — 97110 THERAPEUTIC EXERCISES: CPT | Performed by: PHYSICAL THERAPIST

## 2025-02-03 NOTE — PROGRESS NOTES
PT Re-Evaluation     Today's date: 2/3/2025  Patient name: Wayne Mosley  : 1960  MRN: [id number]  Referring provider: Danny Tay PT  Dx:   Encounter Diagnosis     ICD-10-CM    1. Acute pain of left knee  M25.562       2. History of arthroplasty of left knee  Z96.652           Start Time: 905  Stop Time: 943  Total time in clinic (min): 38 minutes    Assessment  Impairments: abnormal muscle firing, abnormal or restricted ROM, abnormal movement, activity intolerance, impaired physical strength, pain with function, poor body mechanics and activity limitations    Assessment details: Patient is a 64 y.o. male presenting to direct access reexamination with chief complaint of L knee pain. Patient exhibits good progress toward objective and functional goals at time of reexamination. Objective examination limited as patient recently underwent procedure to remove kidney stone and is not feeling back to baseline. Patient exhibits improvements with single leg stance time on floor and foam, tolerance to changes of direction and lateral movements since initiating PT however continues to have limitations compared to prior level of function. 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 As a result of impairments patient has continued limitations with daily and functional activities and would benefit from continued skilled PT interventions to address these impairments in order to maximize function.          Prognosis: good    Goals  Impairment Goals: 4-6 weeks  - Patient to decrease pain to 0/10 - MET  - Patient to improve single leg hop symmetry to 90% - PROGRESSING    Functional Goals: by discharge  - Patient to discharge to independent [facility] - PROGRESSING  - Patient to improve subjective functional level to 85% - PROGRESSING  - Patient to improve tolerance to lateral movements - PARTIALLY MET  - Patient to improve tolerance to quick changes of  direction - PARTIALLY MET    Plan  Patient would benefit from: skilled physical therapy  Planned modality interventions: cryotherapy, TENS and thermotherapy: hydrocollator packs    Planned therapy interventions: flexibility, home exercise program, joint mobilization, manual therapy, neuromuscular re-education, patient education, strengthening, stretching, therapeutic activities, therapeutic exercise and functional ROM exercises    Frequency: 1x week  Duration in weeks: 6  Treatment plan discussed with: patient        Subjective Evaluation    History of Present Illness  Mechanism of injury: HISTORY OF PRESENT ILLNESS: Patient reports he is improving with interventions. He has been progressing well however recently had kidney stone with subsequent procedure to remove it and he is not yet back to his baseline. 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 including changes of direction and lateral movements  PRIOR TREATMENT: PT  AGGRAVATING FACTORS: N/A  EASING FACTORS: N/A  WORK:  and referee (2 games per week)  IMAGING: none  FUNCTIONAL LIMITATIONS: refereeing basketball, quick changes of direction, and lateral movements (all improved)  IMPROVEMENTS: tolerance to changes of direction and lateral movements  SUBJECTIVE FUNCTIONAL LEVEL: 75-80%  PATIENT GOAL: to get this stiffness out  Pain  Current pain ratin  At best pain ratin  At worst pain ratin  Location: L knee          Objective     Active Range of Motion   Left Knee   Hyperextension  Flexion: 123 degrees   Extension: 3 degrees     Right Knee   Normal active range of motion    Strength/Myotome Testing     Left Hip   Planes of Motion   Extension: 4+  Abduction: 4+    Additional Strength Details  WFL exceptions as above    Functional Assessment        Single Leg Stance   Left: 26 seconds  Right: 30 seconds    Comments  SLS foam:  R: 19  L: 21    Single leg hop symmetry: 81.7%  Deferred hop testing 2/3  Single hop distance   Left:   Trial 1: 90  Trial  "2: 92   Trial 2: 100   Right:   Trial 1: 105   Trial 2: 118  Trial 3: 122                Diagnosis: s/p L TKA   Precautions: cancer   Primary impairments: hip abduction/extension strength deficits, single leg balance deficits, and single leg hop asymmetry   *asterisks by exercise = given for HEP    2/3 12/30 1/6 1/13 1/20   Manuals                                                There Ex        Rec bike  L2 x 8'  L2 x 8'  L2 x 8'  L2 x 8'  L2 x 8'                                                                   Neuro Re-Ed        S/L hip abd   5\" x 15  5\" x 15     S/L hip circles     X 15 cw/ccw    Prone hip ext   5\" x 15  5\" x 15  5\" x 15    SLS foam *   15\" x 5  15\" x 5  5\" x 10    SLS foam ball toss   X 3 to fatigue  X 3 to fatigue  X 3 to fatigue    Sidestepping and waddle walks *   Green x 2 laps  Green x 2 laps  HEP    Agility ladder    10'  10'    Single leg hops fwd/lat   X 10 ea  X 10 ea  X 10 ea    [place] *   X 5 laps  HEP     Opening up drill     Completed     U/L leg press        Squats on Bosu                                Re-evaluation  CM           Ther Act/Gait                                      Modalities                                                         "

## 2025-02-03 NOTE — TELEPHONE ENCOUNTER
Post Op Note    Wayne Mosley is a 64 y.o. male s/p Cysto  performed 1/30/2025.  Wayne Mosley is a patient of Dr. Maxwell and is seen at the [place] office.     How would you rate your pain on a scale from 1 to 10, 10 being the worst pain ever? 7 having back spasms with heating padand also [id number]  Have you had a fever? No  Have your bowel movements been regular? No didn't  have prior also also didn't have good appetitie  Do you have any difficulty urinating? No  Do you have any other questions or concerns that I can address at this time? Patient scheduled for 3/25 @ 9:20 at the [place] office with Jazmyn and u/s prior @ 3/16    Patient understood

## 2025-02-04 NOTE — ANESTHESIA POSTPROCEDURE EVALUATION
Post-Op Assessment Note    CV Status:  Stable    Pain management: adequate       Mental Status:  Alert and awake   Hydration Status:  Euvolemic   PONV Controlled:  Controlled   Airway Patency:  Patent     Post Op Vitals Reviewed: Yes    No anethesia notable event occurred.    Staff: Anesthesiologist           Last Filed PACU Vitals:  Vitals Value Taken Time   Temp 98.7 °F (37.1 °C) 01/30/25 1154   Pulse 69 01/30/25 1302   /76 01/30/25 1300   Resp 22 01/30/25 1302   SpO2 94 % 01/30/25 1302   Vitals shown include unfiled device data.    Modified Matt:     Vitals Value Taken Time   Activity 2 01/30/25 1245   Respiration 2 01/30/25 1245   Circulation 2 01/30/25 1245   Consciousness 2 01/30/25 1245   Oxygen Saturation 2 01/30/25 1245     Modified Matt Score: 10

## 2025-02-05 LAB
COLOR STONE: NORMAL
COM MFR STONE: 100 %
COMMENT-STONE3: NORMAL
COMPOSITION: NORMAL
LABORATORY COMMENT REPORT: NORMAL
PHOTO: NORMAL
SIZE STONE: NORMAL MM
SPEC SOURCE SUBJ: NORMAL
STONE ANALYSIS-IMP: NORMAL
WT STONE: 18 MG

## 2025-02-10 ENCOUNTER — OFFICE VISIT (OUTPATIENT)
Dept: PHYSICAL THERAPY | Facility: REHABILITATION | Age: 65
End: 2025-02-10
Payer: MEDICARE

## 2025-02-10 DIAGNOSIS — M25.562 ACUTE PAIN OF LEFT KNEE: Primary | ICD-10-CM

## 2025-02-10 DIAGNOSIS — Z96.652 HISTORY OF ARTHROPLASTY OF LEFT KNEE: ICD-10-CM

## 2025-02-10 PROCEDURE — 97110 THERAPEUTIC EXERCISES: CPT

## 2025-02-10 PROCEDURE — 97112 NEUROMUSCULAR REEDUCATION: CPT

## 2025-02-10 NOTE — PROGRESS NOTES
"Daily Note     Today's date: 2/10/2025  Patient name: Wayne Mosley  : 1960  MRN: [id number]  Referring provider: Danny Tay, PT  Dx:   Encounter Diagnosis     ICD-10-CM    1. Acute pain of left knee  M25.562       2. History of arthroplasty of left knee  Z96.652           Start Time: 09  Stop Time: 09  Total time in clinic (min): 35 minutes    Subjective: Patient reports that he is having back spasms today and would like to avoid certain exercises so as not to further exacerbate this area. Stiffness reported into Left knee.       Objective: See treatment diary below      Assessment: Added supine bridges with resisted hip abduction, monster walks and BOSU squats. Held hopping exercises and agility activities this session due to complaint of LB spasms. Patient appeared to tolerate treatment well and without complaint although muscular fatigue noted afterwards. Patient demonstrated fatigue post treatment, exhibited good technique with therapeutic exercises, and would benefit from continued PT      Plan: Continue per plan of care.  Progress treatment as tolerated.         Diagnosis: s/p L TKA   Precautions: cancer   Primary impairments: hip abduction/extension strength deficits, single leg balance deficits, and single leg hop asymmetry   *asterisks by exercise = given for HEP    2/3 2/10 1/6 1/13 1/20   Manuals                                                There Ex        Rec bike  L2 x 8' L2 x8'  L2 x 8'  L2 x 8'  L2 x 8'                                                                   Neuro Re-Ed        S/L hip abd  Flex/Abd 3\" 2x10 ea  5\" x 15     S/L hip circles     X 15 cw/ccw    Prone hip ext  3\" 2x10  5\" x 15  5\" x 15    SLS foam *  15\"x5 (airex)  15\" x 5  5\" x 10    SLS foam ball toss  NV  X 3 to fatigue  X 3 to fatigue    Sidestepping and waddle walks *  GTB 3 laps ea (side and monster)  Green x 2 laps  HEP    Agility ladder  NV  10'  10'    Single leg hops fwd/lat  NV  X 10 ea  X 10 ea  " "  [place] *    HEP     Opening up drill     Completed     U/L leg press        Squats on Bosu  x10      Bridge with Hip Abd  GTB 5\" 2x10                      Re-evaluation  CM          Ther Act/Gait                                     Modalities                                                         "

## 2025-02-17 ENCOUNTER — OFFICE VISIT (OUTPATIENT)
Dept: PHYSICAL THERAPY | Facility: REHABILITATION | Age: 65
End: 2025-02-17
Payer: MEDICARE

## 2025-02-17 DIAGNOSIS — M25.562 ACUTE PAIN OF LEFT KNEE: Primary | ICD-10-CM

## 2025-02-17 DIAGNOSIS — Z96.652 HISTORY OF ARTHROPLASTY OF LEFT KNEE: ICD-10-CM

## 2025-02-17 PROCEDURE — 97110 THERAPEUTIC EXERCISES: CPT | Performed by: PHYSICAL THERAPIST

## 2025-02-17 PROCEDURE — 97112 NEUROMUSCULAR REEDUCATION: CPT | Performed by: PHYSICAL THERAPIST

## 2025-02-17 NOTE — PROGRESS NOTES
"Daily Note     Today's date: 2025  Patient name: Wayne Mosley  : 1960  MRN: [id number]  Referring provider: Danny Tay, PT  Dx:   Encounter Diagnosis     ICD-10-CM    1. Acute pain of left knee  M25.562       2. 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 His back spasms are better      Objective: See treatment diary below      Assessment: Tolerated treatment well. Improving stability with single leg stance on foam with ball toss. Initiated unilateral leg press with fairly symmetrical strength side to side. Patient exhibited good technique with therapeutic exercises and would benefit from continued PT      Plan: Continue per plan of care.        Diagnosis: s/p L TKA   Precautions: cancer   Primary impairments: hip abduction/extension strength deficits, single leg balance deficits, and single leg hop asymmetry   *asterisks by exercise = given for HEP    2/3 2/10 2/17 1/13 1/20   Manuals                                                There Ex        Rec bike  L2 x 8' L2 x8'  L2 x 8'  L2 x 8'  L2 x 8'                                                                   Neuro Re-Ed        S/L hip abd  Flex/Abd 3\" 2x10 ea      S/L hip circles     X 15 cw/ccw    Prone hip ext  3\" 2x10   5\" x 15    SLS foam *  15\"x5 (airex)   5\" x 10    SLS foam ball toss  NV  X 3 to fatigue  X 3 to fatigue    Sidestepping and waddle walks *  GTB 3 laps ea (side and monster)   HEP    Agility ladder  NV  10'  10'    Single leg hops fwd/lat  NV  Agility ladder  X 10 ea    [place] *        U/L leg press    90 lbs x 20     Squats on Bosu  x10  X 30     Fwd walking lunges    X 2 laps                             Re-evaluation  CM          Ther Act/Gait                                     Modalities                                     "

## 2025-02-24 ENCOUNTER — OFFICE VISIT (OUTPATIENT)
Dept: PHYSICAL THERAPY | Facility: REHABILITATION | Age: 65
End: 2025-02-24
Payer: MEDICARE

## 2025-02-24 DIAGNOSIS — Z96.652 HISTORY OF ARTHROPLASTY OF LEFT KNEE: ICD-10-CM

## 2025-02-24 DIAGNOSIS — R30.0 DYSURIA: Primary | ICD-10-CM

## 2025-02-24 DIAGNOSIS — M25.562 ACUTE PAIN OF LEFT KNEE: Primary | ICD-10-CM

## 2025-02-24 PROCEDURE — 97112 NEUROMUSCULAR REEDUCATION: CPT | Performed by: PHYSICAL THERAPIST

## 2025-02-24 PROCEDURE — 97110 THERAPEUTIC EXERCISES: CPT | Performed by: PHYSICAL THERAPIST

## 2025-02-24 NOTE — PROGRESS NOTES
"Daily Note     Today's date: 2025  Patient name: Wayne Mosley  : 1960  MRN: [id number]  Referring provider: Danny Tay PT  Dx:   Encounter Diagnosis     ICD-10-CM    1. Acute pain of left knee  M25.562       2. 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 Improved efficiency with agility ladder drills. Slight discomfort noted during unilateral hopping. Patient exhibited good technique with therapeutic exercises and would benefit from continued PT. Consider discharge to independent [facility] after next visit      Plan: Continue per plan of care.        Diagnosis: s/p L TKA   Precautions: cancer   Primary impairments: hip abduction/extension strength deficits, single leg balance deficits, and single leg hop asymmetry   *asterisks by exercise = given for HEP    2/3 2/10 2/17 2/24 1/20   Manuals                                                There Ex        Rec bike  L2 x 8' L2 x8'  L2 x 8'  L2 x 8'  L2 x 8'                                                                   Neuro Re-Ed        S/L hip abd  Flex/Abd 3\" 2x10 ea      S/L hip circles        Prone hip ext  3\" 2x10      SLS foam *  15\"x5 (airex)      SLS foam ball toss  NV  X 3 to fatigue  X 3 to fatigue    Sidestepping and waddle walks *  GTB 3 laps ea (side and monster)      Agility ladder  NV  10'  10'    Single leg hops fwd/lat  NV  Agility ladder  Agility ladder    [place] *        U/L leg press    90 lbs x 20  95 lbs x 20    Squats on Bosu  x10  X 30  X 30    Fwd walking lunges    X 2 laps  X 2 laps                            Re-evaluation  CM          Ther Act/Gait                                     Modalities              "

## 2025-02-27 ENCOUNTER — APPOINTMENT (OUTPATIENT)
Dept: LAB | Facility: CLINIC | Age: 65
End: 2025-02-27
Payer: MEDICARE

## 2025-02-27 DIAGNOSIS — R30.0 DYSURIA: ICD-10-CM

## 2025-02-27 LAB
BACTERIA UR QL AUTO: ABNORMAL /HPF
BILIRUB UR QL STRIP: NEGATIVE
CLARITY UR: CLEAR
COLOR UR: YELLOW
GLUCOSE UR STRIP-MCNC: NEGATIVE MG/DL
HGB UR QL STRIP.AUTO: NEGATIVE
KETONES UR STRIP-MCNC: NEGATIVE MG/DL
LEUKOCYTE ESTERASE UR QL STRIP: NEGATIVE
MUCOUS THREADS UR QL AUTO: ABNORMAL
NITRITE UR QL STRIP: NEGATIVE
NON-SQ EPI CELLS URNS QL MICRO: ABNORMAL /HPF
PH UR STRIP.AUTO: 6 [PH]
PROT UR STRIP-MCNC: ABNORMAL MG/DL
RBC #/AREA URNS AUTO: ABNORMAL /HPF
SP GR UR STRIP.AUTO: 1.02 (ref 1–1.03)
UROBILINOGEN UR STRIP-ACNC: <2 MG/DL
WBC #/AREA URNS AUTO: ABNORMAL /HPF

## 2025-02-27 PROCEDURE — 81001 URINALYSIS AUTO W/SCOPE: CPT

## 2025-02-28 ENCOUNTER — RESULTS FOLLOW-UP (OUTPATIENT)
Dept: FAMILY MEDICINE CLINIC | Facility: CLINIC | Age: 65
End: 2025-02-28

## 2025-03-04 ENCOUNTER — OFFICE VISIT (OUTPATIENT)
Dept: PHYSICAL THERAPY | Facility: REHABILITATION | Age: 65
End: 2025-03-04
Payer: MEDICARE

## 2025-03-04 DIAGNOSIS — M25.562 ACUTE PAIN OF LEFT KNEE: Primary | ICD-10-CM

## 2025-03-04 DIAGNOSIS — Z96.652 HISTORY OF ARTHROPLASTY OF LEFT KNEE: ICD-10-CM

## 2025-03-04 PROCEDURE — 97112 NEUROMUSCULAR REEDUCATION: CPT | Performed by: PHYSICAL THERAPIST

## 2025-03-04 NOTE — PROGRESS NOTES
PT Re-Evaluation  and PT Discharge    Today's date: 3/4/2025  Patient name: Wayne Mosley  : 1960  MRN: [id number]  Referring provider: Martinez Chen DO  Dx:   Encounter Diagnosis     ICD-10-CM    1. Acute pain of left knee  M25.562       2. History of arthroplasty of left knee  Z96.652           Start Time: 0900  Stop Time: 09  Total time in clinic (min): 35 minutes    Assessment    Assessment details: Patient is a 64 y.o. male presenting to direct access reexamination with chief complaint of L knee pain. Patient has been compliant with physical therapy and has achieved functional goals at this time. 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 Patient is discharged to independent [facility]. Reviewed HEP, educated patient regarding discharge plan, and answered all patient questions to satisfaction. Good prognosis.           Prognosis: good    Goals  Impairment Goals: 4-6 weeks  - Patient to decrease pain to 0/10 - MET  - Patient to improve single leg hop symmetry to 90% - NOT TESTED    Functional Goals: by discharge  - Patient to discharge to independent [facility] - MET  - Patient to improve subjective functional level to 85% - NOT MET  - Patient to improve tolerance to lateral movements - MET  - Patient to improve tolerance to quick changes of direction - MET    Plan    Treatment plan discussed with: patient  Plan details: Discharge to independent [facility]        Subjective Evaluation    History of Present Illness  Mechanism of injury: HISTORY OF PRESENT ILLNESS: Patient reports he is improving with interventions and 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 He had one fall when he stepped on a player's foot however this was not related to his knee. He feels ready to discharge to independent [facility].   PRIOR TREATMENT: PT  AGGRAVATING FACTORS:  "N/A  EASING FACTORS: N/A  WORK:  and referee (2 games per week)  IMAGING: none  FUNCTIONAL LIMITATIONS: none  IMPROVEMENTS: refereeing basketball, tolerance to changes of direction, and lateral movements  SUBJECTIVE FUNCTIONAL LEVEL: 80%  PATIENT GOAL: to get this stiffness out  Pain  Current pain ratin  At best pain ratin  At worst pain ratin  Location: L knee          Objective     Active Range of Motion   Left Knee   Hyperextension  Flexion: 123 degrees   Extension: 3 degrees     Right Knee   Normal active range of motion    Strength/Myotome Testing     Lumbar   Left   Normal strength    Right   Normal strength    Functional Assessment        Single Leg Stance   Left: 30 seconds  Right: 30 seconds    Comments  SLS foam symmetrical      Flowsheet Rows      Flowsheet Row Most Recent Value   PT/OT G-Codes    Current Score 75   Projected Score 78                 Diagnosis: s/p L TKA   Precautions: cancer   Primary impairments: hip abduction/extension strength deficits, single leg balance deficits, and single leg hop asymmetry   *asterisks by exercise = given for HEP    2/3 2/10 2/17 2/24 3/4   Manuals                                                There Ex        Rec bike  L2 x 8' L2 x8'  L2 x 8'  L2 x 8'  L3 x 8' upright                                                                   Neuro Re-Ed        S/L hip abd  Flex/Abd 3\" 2x10 ea      S/L hip circles        Prone hip ext  3\" 2x10      SLS foam *  15\"x5 (airex)      SLS foam ball toss  NV  X 3 to fatigue  X 3 to fatigue    Sidestepping and waddle walks *  GTB 3 laps ea (side and monster)      Agility ladder  NV  10'  10'    Single leg hops fwd/lat  NV  Agility ladder  Agility ladder    [place] *        U/L leg press    90 lbs x 20  95 lbs x 20    Squats on Bosu  x10  X 30  X 30    Fwd walking lunges    X 2 laps  X 2 laps                            Re-evaluation  CM       CM   Ther Act/Gait                                  Modalities        "

## 2025-03-24 NOTE — PROGRESS NOTES
3/25/2025    Chief Complaint   Patient presents with   • Ureterolithiasis     Assessment and Plan    65 y.o. male managed by Dr. Maxwell       1. History of kidney stones  Assessment & Plan:    Status post uteroscopy for right ureteral stone 1/30 with Dr. Maxwell   Findings-right distal and mid ureteroscopy with semirigid ureteroscope did not demonstrate any stone fragments after removal of the single right ureteral stone. Atraumatic stone removal, therefore decision made to omit stent placement.   Complications-none  Plan was for patient to follow-up in urology in 2 months with renal ultrasound completed in 6 to 8 weeks time  Renal ultrasound not yet obtained  Reviewed obtaining with patient today   Plan to schedule   Stone analysis reviewed- calcium oxalate   Discussed dietray recommendations along with adequate hydration of at least 2.5 L daily for stone prevention   Dietary recommendations provided in after visit summary   Denies flank pain and gross hematuria   Reviewed ED precautions   Follow up in 1 year with  US and KUB prior if US stable   Orders:  -     US kidney and bladder; Future; Expected date: 03/25/2026  -     XR abdomen 1 view kub; Future; Expected date: 03/25/2026    History of Present Illness  Wayne Mosley is a 65 y.o. male here for evaluation of stone intervention on 1/30 for a right distal ureteral calculus measuring 3 mm.  Since surgery patient reports no flank pain or gross hematuria.  He reports  a total of 3 kidney stones.  His first stone he was able to pass with medical expulsion therapy.  In 2018 he did need surgical intervention for his second stone.  He reports being a big iced tea drinker and has cut this out of his diet.  He reports not being great with his water intake.  He plans to continue to eliminate iced tea and add water to his diet.     Patient has a medical history of RALP/bilateral PLND on 11/30/2021 through [facility] for Prostate cancer [facility] [facility]. Last PSA on  Patient ID: Annie Matthews is a 64 y.o. female.    Chief Complaint: Pain of the Left Shoulder      HISTORY:  Annie Matthews is a 64 y.o. female who returns to me today for follow up of left shoulder pain.  She was last seen by me 12/18/2023.  Today she reports worsening pain in her left shoulder.  She has tried creams, OTC medications, rest, home exercises.  She has a lot of pain at night.  She would like an injection today.      PMH/PSH/FamHx/SocHx:    Unchanged from prior visit.    ROS:  Constitution: Negative for chills, fever and weakness.   Respiratory: Negative for cough and shortness of breath.   Musculoskeletal: Positive for left shoulder pain  Psychiatric/Behavioral: The patient is not nervous/anxious.       PHYSICAL EXAM:   Left shoulder  Skin intact  No warmth or erythema  TTP lateral acromion  ROM painful    + impingement  + uribe      ASSESSMENT/PLAN:    Annie was seen today for pain.    Diagnoses and all orders for this visit:    Shoulder strain, left, initial encounter    Impingement syndrome of left shoulder  -     Large Joint Aspiration/Injection: L subacromial bursa      - Discussed options- would like to try left shoulder CSI today. Noted recent elevated A1c- she states since she had this done her medication was adjusted and blood glucose had been under control- running between 100-130s.  We elected to proceed with CSI due to better control of blood glucose but advised to monitor closely.  - Continue rest, ibuprofen, ice, gentle ROM exercise  - Will obtain MRI if symptoms don't improve       "file 2/2025- <0.02. Continues to follow with [facility] for PSA monitoring.    Review of Systems   Constitutional:  Negative for chills and fever.   HENT:  Negative for ear pain and sore throat.    Eyes:  Negative for pain and visual disturbance.   Respiratory:  Negative for cough and shortness of breath.    Cardiovascular:  Negative for chest pain and palpitations.   Gastrointestinal:  Negative for abdominal pain and vomiting.   Genitourinary:  Negative for decreased urine volume, difficulty urinating, dysuria, flank pain, frequency, hematuria and urgency.   Musculoskeletal:  Negative for arthralgias and back pain.   Skin:  Negative for color change and rash.   Neurological:  Negative for seizures and syncope.   All other systems reviewed and are negative.               Vitals  Vitals:    03/25/25 0906   BP: 112/68   BP Location: Left arm   Patient Position: Sitting   Cuff Size: Standard   Pulse: 62   SpO2: 98%   Weight: 70.8 kg (156 lb)   Height: 5' 10\" (1.778 m)       Physical Exam  Vitals reviewed.   Constitutional:       Appearance: Normal appearance.   HENT:      Head: Normocephalic and atraumatic.   Eyes:      Conjunctiva/sclera: Conjunctivae normal.   Pulmonary:      Effort: Pulmonary effort is normal.   Abdominal:      General: Abdomen is flat. There is no distension.      Palpations: Abdomen is soft.      Tenderness: There is no abdominal tenderness.   Musculoskeletal:         General: Normal range of motion.      Cervical back: Normal range of motion.   Skin:     General: Skin is warm and dry.   Neurological:      General: No focal deficit present.      Mental Status: He is alert and oriented to person, place, and time.   Psychiatric:         Mood and Affect: Mood normal.         Behavior: Behavior normal.         Thought Content: Thought content normal.         Judgment: Judgment normal.           Past History  Past Medical History:   Diagnosis Date   • Abnormal blood chemistry    • Allergic    • " Allergic rhinitis     last assessed 08/22/2014   • Anxiety    • Cancer (HCC)    • Elevated PSA    • Erectile dysfunction of non-organic origin    • Hyperglycemia     Last Assessed: 1/20/2016    • Hyperlipidemia    • Kidney stone    • Paresthesia of foot     last assessed 08/14/2015     Social History     Socioeconomic History   • Marital status: /Civil Union     Spouse name: None   • Number of children: None   • Years of education: None   • Highest education level: None   Occupational History   • None   Tobacco Use   • Smoking status: Never     Passive exposure: Never   • Smokeless tobacco: Never   Vaping Use   • Vaping status: Never Used   Substance and Sexual Activity   • Alcohol use: Yes     Comment: occasional/social alcohol use    • Drug use: No   • Sexual activity: Not Currently     Partners: Female     Birth control/protection: Abstinence   Other Topics Concern   • None   Social History Narrative    Feels Safe at home     No guns in the home     Always uses seat belt      Social Drivers of Health     Financial Resource Strain: Low Risk  (9/4/2024)    Received from [facility]    Overall Financial Resource Strain (CARDIA)    • Difficulty of Paying Living Expenses: Not very hard   Food Insecurity: No Food Insecurity (1/30/2025)    Nursing - Inadequate Food Risk Classification    • Worried About Running Out of Food in the Last Year: Never true    • Ran Out of Food in the Last Year: Never true    • Ran Out of Food in the Last Year: Never true   Transportation Needs: No Transportation Needs (1/30/2025)    Nursing - Transportation Risk Classification    • Lack of Transportation: Not on file    • Lack of Transportation: No   Physical Activity: Not on file   Stress: No Stress Concern Present (9/4/2024)    Received from [facility]    [employer] [employer] - Occupational Stress Questionnaire    • Feeling of Stress : Only a little   Social Connections: Feeling  Socially Integrated (2024)    Received from [facility]    OASIS : Social Isolation    • How often do you feel lonely or isolated from those around you?: Rarely   Intimate Partner Violence: Unknown (2025)    Nursing IPS    • Feels Physically and Emotionally Safe: Not on file    • Physically Hurt by Someone: Not on file    • Humiliated or Emotionally Abused by Someone: Not on file    • Physically Hurt by Someone: No    • Hurt or Threatened by Someone: No   Housing Stability: Unknown (2025)    Nursing: Inadequate Housing Risk Classification    • Has Housing: Not on file    • Worried About Losing Housing: Not on file    • Unable to Get Utilities: Not on file    • Unable to Pay for Housing in the Last Year: No    • Has Housin     Social History     Tobacco Use   Smoking Status Never   • Passive exposure: Never   Smokeless Tobacco Never     Family History   Problem Relation Age of Onset   • Lung cancer Mother    • Cancer Mother    • Lung cancer Father    • Cancer Father        The following portions of the patient's history were reviewed and updated as appropriate: allergies, current medications, past medical history, past social history, past surgical history and problem list.    Results  No results found for this or any previous visit (from the past hour).]  Lab Results   Component Value Date    PSA 5.4 (H) 2021    PSA 4.5 (H) 2020    PSA 3.9 2020    PSA 8.9 (H) 2020     Lab Results   Component Value Date    GLUCOSE 108 2019    CALCIUM 8.4 2025     2017    K 4.1 2025    CO2 25 2025     (H) 2025    BUN 23 2025    CREATININE 1.17 2025     Lab Results   Component Value Date    WBC 6.34 2025    HGB 11.4 (L) 2025    HCT 36.0 (L) 2025    MCV 94 2025     2025

## 2025-03-25 ENCOUNTER — OFFICE VISIT (OUTPATIENT)
Dept: UROLOGY | Facility: CLINIC | Age: 65
End: 2025-03-25
Payer: MEDICARE

## 2025-03-25 VITALS
OXYGEN SATURATION: 98 % | HEART RATE: 62 BPM | BODY MASS INDEX: 22.33 KG/M2 | WEIGHT: 156 LBS | SYSTOLIC BLOOD PRESSURE: 112 MMHG | HEIGHT: 70 IN | DIASTOLIC BLOOD PRESSURE: 68 MMHG

## 2025-03-25 DIAGNOSIS — Z87.442 HISTORY OF KIDNEY STONES: Primary | ICD-10-CM

## 2025-03-25 PROCEDURE — 99213 OFFICE O/P EST LOW 20 MIN: CPT

## 2025-03-25 NOTE — ASSESSMENT & PLAN NOTE
Status post uteroscopy for right ureteral stone 1/30 with Dr. Maxwell   Findings-right distal and mid ureteroscopy with semirigid ureteroscope did not demonstrate any stone fragments after removal of the single right ureteral stone. Atraumatic stone removal, therefore decision made to omit stent placement.   Complications-none  Plan was for patient to follow-up in urology in 2 months with renal ultrasound completed in 6 to 8 weeks time  Renal ultrasound not yet obtained  Reviewed obtaining with patient today   Plan to schedule   Stone analysis reviewed- calcium oxalate   Discussed dietray recommendations along with adequate hydration of at least 2.5 L daily for stone prevention   Dietary recommendations provided in after visit summary   Denies flank pain and gross hematuria   Reviewed ED precautions   Follow up in 1 year with  US and KUB prior if US stable

## 2025-04-15 LAB
ALBUMIN SERPL-MCNC: 4.4 G/DL (ref 3.6–5.1)
ALBUMIN/GLOB SERPL: 1.6 (CALC) (ref 1–2.5)
ALP SERPL-CCNC: 36 U/L (ref 35–144)
ALT SERPL-CCNC: 35 U/L (ref 9–46)
AST SERPL-CCNC: 26 U/L (ref 10–35)
BASOPHILS # BLD AUTO: 48 CELLS/UL (ref 0–200)
BASOPHILS NFR BLD AUTO: 1.2 %
BILIRUB SERPL-MCNC: 0.9 MG/DL (ref 0.2–1.2)
BUN SERPL-MCNC: 22 MG/DL (ref 7–25)
BUN/CREAT SERPL: ABNORMAL (CALC) (ref 6–22)
CALCIUM SERPL-MCNC: 9.5 MG/DL (ref 8.6–10.3)
CHLORIDE SERPL-SCNC: 104 MMOL/L (ref 98–110)
CHOLEST SERPL-MCNC: 146 MG/DL
CHOLEST/HDLC SERPL: 3.3 (CALC)
CO2 SERPL-SCNC: 28 MMOL/L (ref 20–32)
CREAT SERPL-MCNC: 0.95 MG/DL (ref 0.7–1.35)
EOSINOPHIL # BLD AUTO: 148 CELLS/UL (ref 15–500)
EOSINOPHIL NFR BLD AUTO: 3.7 %
ERYTHROCYTE [DISTWIDTH] IN BLOOD BY AUTOMATED COUNT: 12.3 % (ref 11–15)
GFR/BSA.PRED SERPLBLD CYS-BASED-ARV: 88 ML/MIN/1.73M2
GLOBULIN SER CALC-MCNC: 2.7 G/DL (CALC) (ref 1.9–3.7)
GLUCOSE SERPL-MCNC: 100 MG/DL (ref 65–99)
HCT VFR BLD AUTO: 44.6 % (ref 38.5–50)
HDLC SERPL-MCNC: 44 MG/DL
HGB BLD-MCNC: 14.5 G/DL (ref 13.2–17.1)
LDLC SERPL CALC-MCNC: 83 MG/DL (CALC)
LYMPHOCYTES # BLD AUTO: 832 CELLS/UL (ref 850–3900)
LYMPHOCYTES NFR BLD AUTO: 20.8 %
MCH RBC QN AUTO: 29.7 PG (ref 27–33)
MCHC RBC AUTO-ENTMCNC: 32.5 G/DL (ref 32–36)
MCV RBC AUTO: 91.4 FL (ref 80–100)
MONOCYTES # BLD AUTO: 456 CELLS/UL (ref 200–950)
MONOCYTES NFR BLD AUTO: 11.4 %
NEUTROPHILS # BLD AUTO: 2516 CELLS/UL (ref 1500–7800)
NEUTROPHILS NFR BLD AUTO: 62.9 %
NONHDLC SERPL-MCNC: 102 MG/DL (CALC)
PLATELET # BLD AUTO: 186 THOUSAND/UL (ref 140–400)
PMV BLD REES-ECKER: 10.4 FL (ref 7.5–12.5)
POTASSIUM SERPL-SCNC: 4 MMOL/L (ref 3.5–5.3)
PROT SERPL-MCNC: 7.1 G/DL (ref 6.1–8.1)
RBC # BLD AUTO: 4.88 MILLION/UL (ref 4.2–5.8)
SODIUM SERPL-SCNC: 139 MMOL/L (ref 135–146)
TRIGL SERPL-MCNC: 94 MG/DL
WBC # BLD AUTO: 4 THOUSAND/UL (ref 3.8–10.8)

## 2025-04-16 ENCOUNTER — RESULTS FOLLOW-UP (OUTPATIENT)
Dept: FAMILY MEDICINE CLINIC | Facility: CLINIC | Age: 65
End: 2025-04-16

## 2025-04-17 ENCOUNTER — TELEPHONE (OUTPATIENT)
Dept: ADMINISTRATIVE | Facility: OTHER | Age: 65
End: 2025-04-17

## 2025-04-17 NOTE — TELEPHONE ENCOUNTER
04/17/25 2:42 PM    Patient contacted to bring Advance Directive, POLST, or Living Will document to next scheduled pcp visit.[facility] Department left message.    Thank you.  Yvette Viveros MA  [facility]

## 2025-04-21 ENCOUNTER — HOSPITAL ENCOUNTER (OUTPATIENT)
Dept: ULTRASOUND IMAGING | Facility: MEDICAL CENTER | Age: 65
Discharge: HOME | End: 2025-04-21
Payer: MEDICARE

## 2025-04-21 ENCOUNTER — OFFICE VISIT (OUTPATIENT)
Dept: FAMILY MEDICINE CLINIC | Facility: CLINIC | Age: 65
End: 2025-04-21
Payer: MEDICARE

## 2025-04-21 VITALS
HEIGHT: 71 IN | DIASTOLIC BLOOD PRESSURE: 70 MMHG | TEMPERATURE: 98.3 F | SYSTOLIC BLOOD PRESSURE: 92 MMHG | OXYGEN SATURATION: 98 % | HEART RATE: 66 BPM | WEIGHT: 152.6 LBS | BODY MASS INDEX: 21.36 KG/M2

## 2025-04-21 DIAGNOSIS — D69.6 THROMBOCYTOPENIA (HCC): ICD-10-CM

## 2025-04-21 DIAGNOSIS — M48.062 SPINAL STENOSIS OF LUMBAR REGION WITH NEUROGENIC CLAUDICATION: ICD-10-CM

## 2025-04-21 DIAGNOSIS — C61 PROSTATE CANCER (HCC): ICD-10-CM

## 2025-04-21 DIAGNOSIS — R73.03 PREDIABETES: ICD-10-CM

## 2025-04-21 DIAGNOSIS — Z87.442 HISTORY OF KIDNEY STONES: Primary | ICD-10-CM

## 2025-04-21 DIAGNOSIS — N20.0 KIDNEY STONE: ICD-10-CM

## 2025-04-21 DIAGNOSIS — E78.2 MIXED HYPERLIPIDEMIA: ICD-10-CM

## 2025-04-21 DIAGNOSIS — C79.11 SECONDARY MALIGNANT NEOPLASM OF BLADDER (HCC): ICD-10-CM

## 2025-04-21 DIAGNOSIS — Z96.652 S/P TOTAL KNEE ARTHROPLASTY, LEFT: ICD-10-CM

## 2025-04-21 PROCEDURE — 76775 US EXAM ABDO BACK WALL LIM: CPT

## 2025-04-21 PROCEDURE — G2211 COMPLEX E/M VISIT ADD ON: HCPCS | Performed by: FAMILY MEDICINE

## 2025-04-21 PROCEDURE — 99214 OFFICE O/P EST MOD 30 MIN: CPT | Performed by: FAMILY MEDICINE

## 2025-04-21 RX ORDER — ROSUVASTATIN CALCIUM 20 MG/1
20 TABLET, COATED ORAL DAILY
Qty: 90 TABLET | Refills: 3 | Status: SHIPPED | OUTPATIENT
Start: 2025-04-21

## 2025-04-21 NOTE — ASSESSMENT & PLAN NOTE
Diagnosed in 2021.  Status post robotic prostatectomy November 2021 at [facility].  Patient was also found to have microinvasion of the bladder neck at that time which was excised.  Doing well.  Continue follow-up with oncology team ([facility])

## 2025-04-21 NOTE — ASSESSMENT & PLAN NOTE
Continue reduced carb diet.  Will continue to monitor  Orders:    Comprehensive metabolic panel; Future    TSH, 3rd generation with Free T4 reflex; Future    Hemoglobin A1c (w/out EAG); Future

## 2025-04-21 NOTE — ASSESSMENT & PLAN NOTE
Cholesterol from April 4 was 146, LDL 83.  Improved since adding Zetia 10 to rosuvastatin 20.  Patient had coronary calcium CT March 2024 (score was 488 with LAU 88%).  He also had a stress test and echocardiogram August 2024 (both were negative).  Continue heart healthy diet and regular exercise program  Orders:    rosuvastatin (CRESTOR) 20 MG tablet; Take 1 tablet (20 mg total) by mouth daily

## 2025-04-21 NOTE — ASSESSMENT & PLAN NOTE
Platelets improved.  Now 186,000 (was 124,000).  Most likely due to clumping.  Patient without any bleeding problems.  Will continue to monitor  Orders:    CBC and differential; Future

## 2025-04-21 NOTE — PROGRESS NOTES
Name: Wayne Mosley      : 1960      MRN: [id number]  Encounter Provider: Martinez Chen DO  Encounter Date: 2025   Encounter department: [facility]    Assessment & Plan  Prostate cancer (HCC)  Diagnosed in .  Status post robotic prostatectomy 2021 at [facility].  Patient was also found to have microinvasion of the bladder neck at that time which was excised.  Doing well.  Continue follow-up with oncology team ([facility])       Secondary malignant neoplasm of bladder (HCC)  (C prostate cancer)       S/P total knee arthroplasty, left  Status post left total knee arthroplasty 2024.  Patient still with pain.  He will most likely be following up with Dena       History of kidney stones  Patient had another kidney stone recently.  This was removed last month.  Patient currently doing well.  Continue yearly follow-up with urology       Mixed hyperlipidemia  Cholesterol from  was 146, LDL 83.  Improved since adding Zetia 10 to rosuvastatin 20.  Patient had coronary calcium CT 2024 (score was 488 with LAU 88%).  He also had a stress test and echocardiogram 2024 (both were negative).  Continue heart healthy diet and regular exercise program  Orders:    rosuvastatin (CRESTOR) 20 MG tablet; Take 1 tablet (20 mg total) by mouth daily    Thrombocytopenia (HCC)  Platelets improved.  Now 186,000 (was 124,000).  Most likely due to clumping.  Patient without any bleeding problems.  Will continue to monitor  Orders:    CBC and differential; Future    Prediabetes  Continue reduced carb diet.  Will continue to monitor  Orders:    Comprehensive metabolic panel; Future    TSH, 3rd generation with Free T4 reflex; Future    Hemoglobin A1c (w/out EAG); Future    Spinal stenosis of lumbar region with neurogenic claudication  Stable            Colonoscopy  (next due )    Current with pneumonia vaccination  Last tetanus  booster 2022  Patient had Shingrix vaccination    Lab results from April 4 reviewed with patient    6 months, fasting blood work prior request      History of Present Illness     Patient presents for recheck of chronic medical problems.  Since last visit, patient had another kidney stone which was removed last month.  Since that time he is doing well.  He is compliant on prescribed medications.  He had labs drawn in April 4.      Review of Systems   Respiratory: Negative.     Cardiovascular: Negative.    Gastrointestinal: Negative.    Genitourinary: Negative.      Past Medical History:   Diagnosis Date    Abnormal blood chemistry     Allergic     Allergic rhinitis     last assessed 08/22/2014    Anxiety     Cancer (HCC)     Elevated PSA     Erectile dysfunction of non-organic origin     Hyperglycemia     Last Assessed: 1/20/2016     Hyperlipidemia     Kidney stone     Paresthesia of foot     last assessed 08/14/2015     Past Surgical History:   Procedure Laterality Date    COLONOSCOPY      FOOT SURGERY      HAND SURGERY Left     HERNIA REPAIR      INGUINAL HERNIA REPAIR      KNEE SURGERY      MO COLONOSCOPY FLX DX W/COLLJ SPEC WHEN PFRMD N/A 01/10/2017    Procedure: COLONOSCOPY;  Surgeon: Car Barbour MD;  Location: [facility];  Service: Gastroenterology    MO CYSTO/URETERO W/LITHOTRIPSY &INDWELL STENT INSRT Left 02/01/2018    Procedure: CYSTOSCOPY, LEFT URETEROSCOPY, RETROGRADE PYELOGRAM;  Surgeon: James Whaley MD;  Location: [facility];  Service: Urology    MO CYSTO/URETERO W/LITHOTRIPSY &INDWELL STENT INSRT Right 1/30/2025    Procedure: CYSTOSCOPY URETEROSCOPY,  RETROGRADE PYELOGRAM, wire basket stone extraction;  Surgeon: Rivas Maxwell MD;  Location:  MAIN OR;  Service: Urology    PROSTATE BIOPSY  12/27/2019    PROSTATE SURGERY       Family History   Problem Relation Age of Onset    Lung cancer Mother     Cancer Mother     Lung cancer Father     Cancer Father      Social History     Tobacco Use     "Smoking status: Never     Passive exposure: Never    Smokeless tobacco: Never   Vaping Use    Vaping status: Never Used   Substance and Sexual Activity    Alcohol use: Yes     Comment: occasional/social alcohol use     Drug use: No    Sexual activity: Not Currently     Partners: Female     Birth control/protection: Abstinence     Current Outpatient Medications on File Prior to Visit   Medication Sig    acetaminophen (TYLENOL) 325 mg tablet Take 2 tablets (650 mg total) by mouth every 6 (six) hours as needed for mild pain, headaches or fever    ascorbic acid (VITAMIN C) 500 mg tablet Take 1,000 mg by mouth daily    DAILY MULTIPLE VITAMINS/IRON PO Take 1 tablet by mouth daily    ezetimibe (ZETIA) 10 mg tablet Take 1 tablet (10 mg total) by mouth daily    tadalafil (CIALIS) 5 MG tablet 1 tab daily    [DISCONTINUED] rosuvastatin (CRESTOR) 20 MG tablet TAKE 1 TABLET BY MOUTH EVERY DAY     Allergies   Allergen Reactions    Other      trees    Pollen Extract      Immunization History   Administered Date(s) Administered    COVID-19 MODERNA VACC 0.5 ML IM 03/27/2021, 04/24/2021, 10/25/2021, 06/02/2022    COVID-19 Moderna Vac BIVALENT 12 Yr+ IM 0.5 ML 11/14/2022    COVID-19 Moderna mRNA Vaccine 12 Yr+ 50 mcg/0.5 mL (Spikevax) 09/30/2024    Hep A / Hep B 03/06/2003, 03/06/2003, 05/23/2003, 05/23/2003, 11/10/2003, 11/10/2003    Influenza Quadrivalent, 6-35 Months IM 12/29/2015, 08/21/2017    Influenza Split High Dose Preservative Free IM 10/17/2024    Influenza, recombinant, quadrivalent,injectable, preservative free 09/05/2018, 01/16/2020, 11/09/2021, 10/31/2022    Influenza, seasonal, injectable 10/25/2013    Pneumococcal Conjugate Vaccine 20-valent (Pcv20), Polysace 03/13/2023    Tdap 03/09/2022    Zoster Vaccine Recombinant 08/01/2023, 11/14/2023     Objective   BP 92/70 (BP Location: Left arm, Patient Position: Sitting, Cuff Size: Adult)   Pulse 66   Temp 98.3 °F (36.8 °C) (Temporal)   Ht 5' 10.5\" (1.791 m)   Wt 69.2 " kg (152 lb 9.6 oz)   SpO2 98%   BMI 21.59 kg/m²     Physical Exam  Constitutional:       Appearance: Normal appearance.   Eyes:      Pupils: Pupils are equal, round, and reactive to light.   Neck:      Vascular: No carotid bruit.   Cardiovascular:      Rate and Rhythm: Normal rate and regular rhythm.      Pulses: Normal pulses.      Heart sounds: Normal heart sounds. No murmur heard.     No gallop.   Pulmonary:      Effort: Pulmonary effort is normal.      Breath sounds: Normal breath sounds.   Neurological:      Mental Status: He is alert.   Psychiatric:         Mood and Affect: Mood normal.         Behavior: Behavior normal.

## 2025-04-21 NOTE — ASSESSMENT & PLAN NOTE
Patient had another kidney stone recently.  This was removed last month.  Patient currently doing well.  Continue yearly follow-up with urology

## 2025-04-21 NOTE — ASSESSMENT & PLAN NOTE
Status post left total knee arthroplasty April 2024.  Patient still with pain.  He will most likely be following up with Dena

## 2025-05-02 ENCOUNTER — TELEPHONE (OUTPATIENT)
Age: 65
End: 2025-05-02

## 2025-05-02 NOTE — TELEPHONE ENCOUNTER
Patient started having pins and needles in his feet 1 week ago. Just had his annual 2 weeks ago. Would like a referral to neurology.

## 2025-05-05 DIAGNOSIS — R20.2 PARESTHESIA OF BOTH LOWER EXTREMITIES: Primary | ICD-10-CM

## 2025-05-05 NOTE — TELEPHONE ENCOUNTER
I spoke with patient.  He states he has been having worsening tingling in both feet for the past few weeks.  He does have history of degenerative disc disease lumbar spine, but states he is not currently have any back problems.  (Last MRI lumbar spine 2022 reviewed).  Patient requesting referral to neurology.  Order placed.  Consider repeat MRI lumbar spine and/or EMG lower extremities as well.

## 2025-05-16 DIAGNOSIS — G62.9 NEUROPATHY: Primary | ICD-10-CM

## 2025-05-16 RX ORDER — GABAPENTIN 300 MG/1
CAPSULE ORAL
Qty: 60 CAPSULE | Refills: 2 | Status: SHIPPED | OUTPATIENT
Start: 2025-05-16

## 2025-05-28 DIAGNOSIS — T75.3XXA MOTION SICKNESS, INITIAL ENCOUNTER: Primary | ICD-10-CM

## 2025-05-28 RX ORDER — MECLIZINE HYDROCHLORIDE 25 MG/1
25 TABLET ORAL EVERY 8 HOURS PRN
Qty: 30 TABLET | Refills: 1 | Status: SHIPPED | OUTPATIENT
Start: 2025-05-28

## 2025-06-09 ENCOUNTER — EVALUATION (OUTPATIENT)
Dept: PHYSICAL THERAPY | Facility: REHABILITATION | Age: 65
End: 2025-06-09
Payer: MEDICARE

## 2025-06-09 DIAGNOSIS — M25.511 ACUTE PAIN OF RIGHT SHOULDER: Primary | ICD-10-CM

## 2025-06-09 PROCEDURE — 97110 THERAPEUTIC EXERCISES: CPT | Performed by: PHYSICAL THERAPIST

## 2025-06-09 PROCEDURE — 97161 PT EVAL LOW COMPLEX 20 MIN: CPT | Performed by: PHYSICAL THERAPIST

## 2025-06-09 NOTE — LETTER
2025    Valerie Bailey DO  [address] [facility] [address]    Patient: Wayne Mosley   YOB: 1960   Date of Visit: 2025     Encounter Diagnosis     ICD-10-CM    1. Acute pain of right shoulder  M25.511           Dear Dr. Valerie Bailey DO:    Thank you for your recent referral of Wayne Mosley. Please review the attached evaluation summary from Wayne's recent visit.     Please verify that you agree with the plan of care by signing the attached order.     If you have any questions or concerns, please do not hesitate to call.     I sincerely appreciate the opportunity to share in the care of one of your patients and hope to have another opportunity to work with you in the near future.       Sincerely,    Danny Tay, PT      Referring Provider:      I certify that I have read the below Plan of Care and certify the need for these services furnished under this plan of treatment while under my care.                    Valeire Bailey DO  [address] [facility] [address]  Via Fax: [phone]          PT Evaluation     Today's date: 2025  Patient name: Wayne Mosley  : 1960  MRN: [id number]  Referring provider: Valerie Bailey DO  Dx:   Encounter Diagnosis     ICD-10-CM    1. Acute pain of right shoulder  M25.511           Start Time: 09  Stop Time: 1030  Total time in clinic (min): 45 minutes    Assessment  Impairments: abnormal muscle firing, abnormal or restricted ROM, abnormal movement, activity intolerance, impaired physical strength, pain with function, poor body mechanics and activity limitations    Assessment details: Patient is a 65 y.o. male presenting to initial examination with chief complaint of R shoulder pain. Signs and symptoms are consistent with R subacromial impingement syndrome. Primary impairments include ER strength deficits and middle/lower trapezius strength deficits. As a result of impairments patient experiences limitations with  functional/daily activities including reaching across body, golfing, lifting objects at work, and lying on R side. Educated patient regarding plan of care and answered all patient questions to patient satisfaction. Patient would benefit from skilled PT interventions to address above impairments, achieve goals, and to maximize function. Thank you for the referral.      Goals  Impairment Goals: 4-6 weeks  - Patient to decrease pain to 0/10  - Patient to improve shoulder AROM to equal to uninvolved side in all planes  - Patient to increase shoulder strength to 4+/5 throughout    Functional Goals: by discharge  - Patient to discharge to independent [facility]  - Patient to improve subjective functional level to 95%  - Patient to be able to reach behind back without increased pain/compensation/difficulty   - Patient to improve tolerance to golfing  - Patient to improve tolerance to lifting at work     Plan  Patient would benefit from: skilled physical therapy  Planned modality interventions: cryotherapy, TENS and thermotherapy: hydrocollator packs    Planned therapy interventions: flexibility, home exercise program, joint mobilization, manual therapy, neuromuscular re-education, patient education, strengthening, stretching, therapeutic activities, therapeutic exercise and functional ROM exercises    Frequency: 1x week  Duration in weeks: 6  Treatment plan discussed with: patient        Subjective Evaluation    History of Present Illness  Mechanism of injury: HISTORY OF PRESENT ILLNESS: Patient reports onset of R shoulder pain 6-8 weeks ago with no KARRI. He noticed increased pain aggravation following golfing. Pain is localized to lateral shoulder and is worse with reaching across body and golfing which he is presently holding off on. No sensation of instability. He additionally reports some tingling in bilateral feet around the same time. He denies any back pain, radicular pain, or new B/B changes. His PCP has referred him to  neurology. Tingling in feet is pretty consistent. No change in symptoms with back positioning. He presents to address shoulder pain. No new neck pain or upper quarter radicular pain  PRIOR TREATMENT: none  AGGRAVATING FACTORS: reaching across body, golfing, lifting objects at work, and lying on R side  EASING FACTORS: none  WORK:  for [employer]  IMAGING: x-rays negative  FUNCTIONAL LIMITATIONS: reaching across body, golfing, lifting objects at work, and lying on R side  SUBJECTIVE FUNCTIONAL LEVEL: 85%  PATIENT GOAL: pain-free and get back to golf  Pain  Current pain ratin  At best pain ratin  At worst pain ratin  Location: R shoulder          Objective     Active Range of Motion   Left Shoulder   Normal active range of motion    Right Shoulder   Flexion: WFL  Abduction: WFL and with pain  External rotation BTH: WFL and with pain  Internal rotation BTB: T12 with pain    Passive Range of Motion   Left Shoulder   Normal passive range of motion    Right Shoulder   Normal passive range of motion    Strength/Myotome Testing     Left Shoulder     Planes of Motion   Flexion: 4+   Abduction: 4+   External rotation at 0°: 4+   Internal rotation at 0°: 4+     Isolated Muscles   Lower trapezius: 4+   Middle trapezius: 4+     Right Shoulder     Planes of Motion   Flexion: 4+   Abduction: 4 (pain)   External rotation at 0°: 4   Internal rotation at 0°: 4+     Isolated Muscles   Lower trapezius: 3+   Middle trapezius: 3+     Tests     Right Shoulder   Positive empty can, Hawkin's and Neer's.   Negative painful arc.     Additional Tests Details  (+) ER resistance      Flowsheet Rows      Flowsheet Row Most Recent Value   PT/OT G-Codes    Current Score 65   Projected Score 72               Diagnosis: R subacromial impingement syndrome   Precautions: hx cancer   Primary impairments: ER strength deficits and middle/lower trapezius strength deficits   *asterisks by exercise = given for HEP           Manuals       "  Posterior GH mobilizations                                        There Ex        UBE        Sleeper stretch        Rhythmic stabilization balance point        S/L ER *  2 lb x 20       Prone rows        Prone ext *  1\" x 20       Prone H. abd        Band ER/IR        Band no moneys                        Neuro Re-Ed                                                                                                        Re-evaluation             Ther Act/Gait                                         Modalities                                                                          "

## 2025-06-09 NOTE — PROGRESS NOTES
PT Evaluation     Today's date: 2025  Patient name: Wayne Mosley  : 1960  MRN: [id number]  Referring provider: Valerie Bailey DO  Dx:   Encounter Diagnosis     ICD-10-CM    1. Acute pain of right shoulder  M25.511           Start Time: 945  Stop Time: 1030  Total time in clinic (min): 45 minutes    Assessment  Impairments: abnormal muscle firing, abnormal or restricted ROM, abnormal movement, activity intolerance, impaired physical strength, pain with function, poor body mechanics and activity limitations    Assessment details: Patient is a 65 y.o. male presenting to initial examination with chief complaint of R shoulder pain. Signs and symptoms are consistent with R subacromial impingement syndrome. Primary impairments include ER strength deficits and middle/lower trapezius strength deficits. As a result of impairments patient experiences limitations with functional/daily activities including reaching across body, golfing, lifting objects at work, and lying on R side. Educated patient regarding plan of care and answered all patient questions to patient satisfaction. Patient would benefit from skilled PT interventions to address above impairments, achieve goals, and to maximize function. Thank you for the referral.      Goals  Impairment Goals: 4-6 weeks  - Patient to decrease pain to 0/10  - Patient to improve shoulder AROM to equal to uninvolved side in all planes  - Patient to increase shoulder strength to 4+/5 throughout    Functional Goals: by discharge  - Patient to discharge to independent [facility]  - Patient to improve subjective functional level to 95%  - Patient to be able to reach behind back without increased pain/compensation/difficulty   - Patient to improve tolerance to golfing  - Patient to improve tolerance to lifting at work     Plan  Patient would benefit from: skilled physical therapy  Planned modality interventions: cryotherapy, TENS and thermotherapy: hydrocollator packs    Planned  therapy interventions: flexibility, home exercise program, joint mobilization, manual therapy, neuromuscular re-education, patient education, strengthening, stretching, therapeutic activities, therapeutic exercise and functional ROM exercises    Frequency: 1x week  Duration in weeks: 6  Treatment plan discussed with: patient        Subjective Evaluation    History of Present Illness  Mechanism of injury: HISTORY OF PRESENT ILLNESS: Patient reports onset of R shoulder pain 6-8 weeks ago with no KARRI. He noticed increased pain aggravation following golfing. Pain is localized to lateral shoulder and is worse with reaching across body and golfing which he is presently holding off on. No sensation of instability. He additionally reports some tingling in bilateral feet around the same time. He denies any back pain, radicular pain, or new B/B changes. His PCP has referred him to neurology. Tingling in feet is pretty consistent. No change in symptoms with back positioning. He presents to address shoulder pain. No new neck pain or upper quarter radicular pain  PRIOR TREATMENT: none  AGGRAVATING FACTORS: reaching across body, golfing, lifting objects at work, and lying on R side  EASING FACTORS: none  WORK: [employer]  IMAGING: x-rays negative  FUNCTIONAL LIMITATIONS: reaching across body, golfing, lifting objects at work, and lying on R side  SUBJECTIVE FUNCTIONAL LEVEL: 85%  PATIENT GOAL: pain-free and get back to golf  Pain  Current pain ratin  At best pain ratin  At worst pain ratin  Location: R shoulder          Objective     Active Range of Motion   Left Shoulder   Normal active range of motion    Right Shoulder   Flexion: WFL  Abduction: WFL and with pain  External rotation BTH: WFL and with pain  Internal rotation BTB: T12 with pain    Passive Range of Motion   Left Shoulder   Normal passive range of motion    Right Shoulder   Normal passive range of motion    Strength/Myotome Testing     Left  "Shoulder     Planes of Motion   Flexion: 4+   Abduction: 4+   External rotation at 0°: 4+   Internal rotation at 0°: 4+     Isolated Muscles   Lower trapezius: 4+   Middle trapezius: 4+     Right Shoulder     Planes of Motion   Flexion: 4+   Abduction: 4 (pain)   External rotation at 0°: 4   Internal rotation at 0°: 4+     Isolated Muscles   Lower trapezius: 3+   Middle trapezius: 3+     Tests     Right Shoulder   Positive empty can, Hawkin's and Neer's.   Negative painful arc.     Additional Tests Details  (+) ER resistance      Flowsheet Rows      Flowsheet Row Most Recent Value   PT/OT G-Codes    Current Score 65   Projected Score 72               Diagnosis: R subacromial impingement syndrome   Precautions: hx cancer   Primary impairments: ER strength deficits and middle/lower trapezius strength deficits   *asterisks by exercise = given for HEP    6/9       Manuals        Posterior GH mobilizations                                        There Ex        UBE        Sleeper stretch        Rhythmic stabilization balance point        S/L ER *  2 lb x 20       Prone rows        Prone ext *  1\" x 20       Prone H. abd        Band ER/IR        Band no moneys                        Neuro Re-Ed                                                                                                        Re-evaluation             Ther Act/Gait                                         Modalities                                                          "

## 2025-06-23 ENCOUNTER — OFFICE VISIT (OUTPATIENT)
Dept: PHYSICAL THERAPY | Facility: REHABILITATION | Age: 65
End: 2025-06-23
Attending: FAMILY MEDICINE
Payer: MEDICARE

## 2025-06-23 DIAGNOSIS — M25.511 ACUTE PAIN OF RIGHT SHOULDER: Primary | ICD-10-CM

## 2025-06-23 PROCEDURE — 97140 MANUAL THERAPY 1/> REGIONS: CPT | Performed by: PHYSICAL THERAPIST

## 2025-06-23 PROCEDURE — 97110 THERAPEUTIC EXERCISES: CPT | Performed by: PHYSICAL THERAPIST

## 2025-06-23 NOTE — PROGRESS NOTES
"Daily Note     Today's date: 2025  Patient name: Wayne Mosley  : 1960  MRN: [id number]  Referring provider: Valerie Bailey DO  Dx:   Encounter Diagnosis     ICD-10-CM    1. Acute pain of right shoulder  M25.511           Start Time: 0900  Stop Time: 0945  Total time in clinic (min): 45 minutes    Subjective: Patient reports he has been doing okay after the examination. He returned from vacation last week      Objective: See treatment diary below      Assessment: Tolerated treatment well. Positive response to posterior glenohumeral mobilizations. Fatigue noted with band ER at high repetitions. Updated HEP to include sleeper stretch and prone horizontal abduction. Very good HEP recall. Patient demonstrated fatigue post treatment, exhibited good technique with therapeutic exercises, and would benefit from continued PT      Plan: Continue per plan of care.        Diagnosis: R subacromial impingement syndrome   Precautions: hx cancer   Primary impairments: ER strength deficits and middle/lower trapezius strength deficits   *asterisks by exercise = given for HEP          Manuals        Posterior GH mobilizations   8'                                      There Ex        UBE   F/B x 6'      Sleeper stretch *   30\" x 3      Rhythmic stabilization balance point   20\" x 3      S/L ER *  2 lb x 20  2 lb x 30      Prone rows   2 lb 1\" x 20      Prone ext *  1\" x 20  1\" x 20      Prone H. Abd *   1\" x 20      Band ER/IR   Green 3 x 15 ea      Band no moneys        Body blade neutral and flexion                                Neuro Re-Ed                                                                                                        Re-evaluation             Ther Act/Gait                                         Modalities                                                          "

## 2025-06-30 ENCOUNTER — OFFICE VISIT (OUTPATIENT)
Dept: PHYSICAL THERAPY | Facility: REHABILITATION | Age: 65
End: 2025-06-30
Attending: FAMILY MEDICINE
Payer: MEDICARE

## 2025-06-30 DIAGNOSIS — M25.511 ACUTE PAIN OF RIGHT SHOULDER: Primary | ICD-10-CM

## 2025-06-30 PROCEDURE — 97110 THERAPEUTIC EXERCISES: CPT | Performed by: PHYSICAL THERAPIST

## 2025-06-30 PROCEDURE — 97140 MANUAL THERAPY 1/> REGIONS: CPT | Performed by: PHYSICAL THERAPIST

## 2025-06-30 NOTE — PROGRESS NOTES
"Daily Note     Today's date: 2025  Patient name: Wayne Mosley  : 1960  MRN: [id number]  Referring provider: Valerie Bailey DO  Dx:   Encounter Diagnosis     ICD-10-CM    1. Acute pain of right shoulder  M25.511           Start Time: 0900  Stop Time: 0945  Total time in clinic (min): 45 minutes    Subjective: Patient reports he was doing okay however he is sore today from having to lift heavy totes at work yesterday      Objective: See treatment diary below      Assessment: Tolerated treatment well. Fatigue with high repetitions of scapular/RTC strengthening as expected. Completed prone horizontal abduction in limited range to eliminate pain. Updated HEP to include band no moneys and provided red band for home use. Patient demonstrated fatigue post treatment, exhibited good technique with therapeutic exercises, and would benefit from continued PT      Plan: Continue per plan of care.        Diagnosis: R subacromial impingement syndrome   Precautions: hx cancer   Primary impairments: ER strength deficits and middle/lower trapezius strength deficits   *asterisks by exercise = given for HEP         Manuals        Posterior GH mobilizations   8'  8'                                     There Ex        UBE   F/B x 6'  F/B x 6'     Sleeper stretch *   30\" x 3  HEP     Rhythmic stabilization balance point   20\" x 3  20\" x 3     S/L ER *  2 lb x 20  2 lb x 30  2 lb x 30     Prone rows   2 lb 1\" x 20  2 lb 1\" x 20     Prone ext *  1\" x 20  1\" x 20  1 lb 1\" x 20     Prone H. Abd *   1\" x 20  1\" x 20     Band ER/IR   Green 3 x 15 ea  Green 3 x 15 ea     Band no moneys *    Red x 30     Body blade neutral and flexion    20\" x 3 ea                             Neuro Re-Ed                                                                                                        Re-evaluation             Ther Act/Gait                                         Modalities                                         "

## 2025-07-07 ENCOUNTER — OFFICE VISIT (OUTPATIENT)
Dept: PHYSICAL THERAPY | Facility: REHABILITATION | Age: 65
End: 2025-07-07
Attending: FAMILY MEDICINE
Payer: MEDICARE

## 2025-07-07 DIAGNOSIS — M25.511 ACUTE PAIN OF RIGHT SHOULDER: Primary | ICD-10-CM

## 2025-07-07 PROCEDURE — 97140 MANUAL THERAPY 1/> REGIONS: CPT | Performed by: PHYSICAL THERAPIST

## 2025-07-07 PROCEDURE — 97110 THERAPEUTIC EXERCISES: CPT | Performed by: PHYSICAL THERAPIST

## 2025-07-07 NOTE — PROGRESS NOTES
"Daily Note     Today's date: 2025  Patient name: Wayne Mosley  : 1960  MRN: [id number]  Referring provider: Valerie Bailey DO  Dx:   Encounter Diagnosis     ICD-10-CM    1. Acute pain of right shoulder  M25.511           Start Time: 0900  Stop Time: 0945  Total time in clinic (min): 45 minutes    Subjective: Patient reports he is a little better but continues to have pain reaching across his body      Objective: See treatment diary below      Assessment: Tolerated treatment well. Progressed resistance with prone rows and extensions with no adverse responses and instructed patient to utilize DB resistance at home. Initiated band scapular clock and updated HEP to include. Patient demonstrated fatigue post treatment, exhibited good technique with therapeutic exercises, and would benefit from continued PT      Plan: Continue per plan of care.        Diagnosis: R subacromial impingement syndrome   Precautions: hx cancer   Primary impairments: ER strength deficits and middle/lower trapezius strength deficits   *asterisks by exercise = given for HEP        Manuals        Posterior GH mobilizations   8'  8'  8'                                    There Ex        UBE   F/B x 6'  F/B x 6'  F/B x 6'    Sleeper stretch *   30\" x 3  HEP     Rhythmic stabilization balance point   20\" x 3  20\" x 3  20\" x 3    S/L ER *  2 lb x 20  2 lb x 30  2 lb x 30  HEP    Prone rows   2 lb 1\" x 20  2 lb 1\" x 20  3 lb 1\" x 20    Prone ext *  1\" x 20  1\" x 20  1 lb 1\" x 20  3 lb 1\" x 20    Prone H. Abd *   1\" x 20  1\" x 20  1 lb 1\" x 20    Band ER/IR   Green 3 x 15 ea  Green 3 x 15 ea  Green 3 x 15 ea    Band no moneys *    Red x 30  HEP    Body blade neutral and flexion    20\" x 3 ea  30\" x 3 ea    Band scapular clock     Red x 15 B                            Neuro Re-Ed                                                                                                        Re-evaluation             Ther Act/Gait   "                                       Modalities

## 2025-07-08 ENCOUNTER — CONSULT (OUTPATIENT)
Dept: NEUROLOGY | Facility: CLINIC | Age: 65
End: 2025-07-08
Attending: FAMILY MEDICINE
Payer: MEDICARE

## 2025-07-08 VITALS
BODY MASS INDEX: 21.42 KG/M2 | OXYGEN SATURATION: 98 % | SYSTOLIC BLOOD PRESSURE: 128 MMHG | HEIGHT: 71 IN | TEMPERATURE: 98.9 F | DIASTOLIC BLOOD PRESSURE: 84 MMHG | HEART RATE: 82 BPM | WEIGHT: 153 LBS

## 2025-07-08 DIAGNOSIS — G60.9 HEREDITARY AND IDIOPATHIC NEUROPATHY, UNSPECIFIED: ICD-10-CM

## 2025-07-08 DIAGNOSIS — R20.2 PARESTHESIA OF BOTH LOWER EXTREMITIES: ICD-10-CM

## 2025-07-08 PROCEDURE — 99204 OFFICE O/P NEW MOD 45 MIN: CPT | Performed by: PSYCHIATRY & NEUROLOGY

## 2025-07-08 NOTE — PROGRESS NOTES
Name: Wayne Mosley      : 1960      MRN: [id number]  Encounter Provider: Toby Kang MD  Encounter Date: 2025   Encounter department: NEUROLOGY [facility]  :  Assessment & Plan  Paresthesia of both lower extremities  This patient has had 2-month history of paresthesias in both his feet, predominantly in the toes, with worsening of symptoms with prolonged driving on the right, with prior history of lower back pain and radicular symptoms in the right lower extremity, not much in the way of lower back pain or radicular symptoms now.  Does not have any muscle weakness, symptoms have not progressed since the onset, no balance issues, no symptoms in the hands.  Exam showed minimal length-dependent sensory loss with normal muscle strength, and decreased reflexes in the ankles.  Symptoms consistent with peripheral neuropathy, which I explained to the patient, discussed common causes of neuropathy including diabetes, prediabetes, metabolic syndrome, other metabolic disorder such as vitamin deficiencies, hypothyroidism, and monoclonal gammopathies.  Recommended some blood work as noted below.  Explained to him, it is not uncommon that despite the extensive workup, the cause of neuropathy may remain unknown, which he understands.  Discussed symptomatic management, not interested in any neuropathic pain medication at this time.    I did recommend an EMG, of the left upper extremity, as mild weakness noted in the ulnar nerve distribution, as well as the right lower extremity to evaluate for radiculopathy/neuropathy.    Return for follow-up with me in 6 to 8 months to ensure stability.    Orders:    Ambulatory Referral to Neurology    Vitamin B12; Future    Methylmalonic acid, serum; Future    Vitamin B6; Future    Protein electrophoresis, serum; Future    Sedimentation rate, automated; Future    EMG 1 Upper/1 Lower Neuropathy; Future    Hereditary and idiopathic neuropathy,  unspecified    Orders:    Vitamin B6; Future          History of Present Illness   HPI     I had the pleasure of seeing your patient in neurology clinic for neuromuscular consultation.  As you know, patient is a 65-year-old man, referred for evaluation for neuropathy.  Please allow me to summarize his history for the record.    Patient reports symptoms started 2 months ago, 1 day he was putting on his socks, when suddenly noted numbness and tingling in his toes, spanning up to mid foot bilaterally.  The symptoms were rather sudden onset, but have not significantly changed since then.  Symptoms get worse when he stands on his feet for extended time, or with prolonged walking, feels better as he takes off his shoes in the evening.  Does not think he has any weakness in either lower extremity, balance overall is okay, this is not interfering with his ability to walk.  Denies any symptoms in his hands, specifically no numbness, no tingling, no weakness in the hands.  Does report pain in the left thumb from a prior injury.  Does have prior history of lower back pain with radicular symptoms on the right, this has not been an issue for him over the years.  Denies any bladder or bowel changes.  Denies any falls.  Other medical problems include dyslipidemia, prediabetes, that was recently diagnosed, prostate cancer status post prostatectomy and radiation in 2021, left knee arthroplasty in 2024, and lumbar spondylosis with mild spinal stenosis.    Patient was recommended gabapentin by primary care physician, took it for about 2 weeks, at the dose of 300 mg twice a day, did not find that beneficial, has been using topical over-the-counter magnesium cream (mama bear ointment) without improvement of his symptoms.    No family history of neuropathy, patient does not smoke, may have an occasional drink when he goes out for dinner.  No occupational exposure.  Mom/dad both had cancer they passed at age 64.       L spine MRI: July  2022: Performed at [facility], images not available for my review.  1. Progression of multilevel spondylosis as described above including a new   right-sided disc extrusion at L3-L4 which indents the traversing right L4 nerve   roots and a new right-sided disc extrusion at L5-S1 which indents the exiting   right L5 nerve root.   2. Mild spinal canal stenosis from L1-L2 through L4-L5. Varying degrees of   foraminal stenosis, as described above.     Labs April 2025: Lipid panel total cholesterol 146, HDL 44, triglycerides 94, LDL 83, CBC/CMP normal except fasting blood glucose of 100.      Hemoglobin A1c was 5.6 in October 2024, TSH in October 2024 was normal, 1.22.    Review of Systems   Constitutional:  Negative for appetite change, fatigue and fever.   HENT: Negative.  Negative for hearing loss, tinnitus, trouble swallowing and voice change.    Eyes: Negative.  Negative for photophobia, pain and visual disturbance.   Respiratory: Negative.  Negative for shortness of breath.    Cardiovascular: Negative.  Negative for palpitations.   Gastrointestinal: Negative.  Negative for nausea and vomiting.   Endocrine: Negative.  Negative for cold intolerance.   Genitourinary: Negative.  Negative for dysuria, frequency and urgency.   Musculoskeletal:  Negative for back pain, gait problem, myalgias, neck pain and neck stiffness.   Skin: Negative.  Negative for rash.   Allergic/Immunologic: Negative.    Neurological:  Positive for numbness (and tingling b/l feet- constant- started 2 months ago/ severity worse when driving). Negative for dizziness, tremors, seizures, syncope, facial asymmetry, speech difficulty, weakness, light-headedness and headaches.   Hematological: Negative.  Does not bruise/bleed easily.   Psychiatric/Behavioral: Negative.  Negative for confusion, hallucinations and sleep disturbance.    All other systems reviewed and are negative.   I have personally reviewed the MA's review of systems and made changes  "as necessary.         Objective   /84 (BP Location: Left arm, Patient Position: Sitting, Cuff Size: Adult)   Pulse 82   Temp 98.9 °F (37.2 °C) (Temporal)   Ht 5' 10.5\" (1.791 m)   Wt 69.4 kg (153 lb)   SpO2 98%   BMI 21.64 kg/m²     Physical Exam  General exam: Pt was awake, alert and oriented.   HEENT: atraumatic, normocephalic.  Normal oral mucosa, neck was supple, no lymphadenopathy.  Normal peripheral pulses.   Extremities did not show any edema or cyanosis.    Neurological Exam  Neurologically, pt was awake and alert.  Speech was normal, no dysarthria or aphasia.   Cranial nerve exam showed normal extraocular movements, no nystagmus or diplopia.   There was no ptosis at baseline or with sustained upward gaze.   Strength of eye closure muscles was normal.  Facial sensations were normal bilaterally.   No facial weakness, able to blow out the cheeks and push the tongue in the cheeks well.   No tongue atrophy or fasciculations.    Motor exam revealed normal tone, bulk was minimally reduced in the left interossei, tinel's was negative at both wrists and elbows. and muscle bulk. There was no atrophy, scapular winging, high arches, hammertoes, shortening of achilles tendons or any other features of neuromuscular disease.   Muscle strength was normal in neck flexors and extensors, and all muscle groups in both upper and lower extremities, except interossei on left which were 4+.   Reflexes were graded as 2+ throughout.   Toes were downgoing. There was no exaggerated jaw jerk or rosado's sign. No ankle clonus.  Sensory exam revealed length dependent, decreased sensation to pin up to mid feet, temp up to ankles. Vibration was 7 sec at toes on right, 10 sec on left. Proprioception was relatively intact. No loss in ulnar or median distribution in either hand..   Rhomberg was neg,Gait was normal and casual.       Portions of the record may have been created with voice recognition software. Occasional wrong word " "or \"sound a like\" substitutions may have occurred due to the inherent limitations of voice recognition software. Read the chart carefully and recognize, using context, where substitutions have occurred.             "

## 2025-07-14 ENCOUNTER — EVALUATION (OUTPATIENT)
Dept: PHYSICAL THERAPY | Facility: REHABILITATION | Age: 65
End: 2025-07-14
Attending: FAMILY MEDICINE
Payer: MEDICARE

## 2025-07-14 DIAGNOSIS — M25.511 ACUTE PAIN OF RIGHT SHOULDER: Primary | ICD-10-CM

## 2025-07-14 PROCEDURE — 97112 NEUROMUSCULAR REEDUCATION: CPT | Performed by: PHYSICAL THERAPIST

## 2025-07-14 PROCEDURE — 97110 THERAPEUTIC EXERCISES: CPT | Performed by: PHYSICAL THERAPIST

## 2025-07-14 NOTE — PROGRESS NOTES
PT Re-Evaluation     Today's date: 2025  Patient name: Wayne Mosley  : 1960  MRN: [id number]  Referring provider: Valerie Bailey DO  Dx:   Encounter Diagnosis     ICD-10-CM    1. Acute pain of right shoulder  M25.511           Start Time: 945  Stop Time: 1025  Total time in clinic (min): 40 minutes    Assessment  Impairments: abnormal muscle firing, abnormal or restricted ROM, abnormal movement, activity intolerance, impaired physical strength, pain with function, poor body mechanics and activity limitations    Assessment details: Patient is a 65 y.o. male presenting to reexamination with chief complaint of R shoulder pain. Patient exhibits fair progress toward objective and functional goals at time of reexamination. Patient exhibits improvements with tolerance to lying on R side, reduced frequency of shoulder clicking, and reduced pain intensity since initiating PT however continues to have limitations compared to prior level of function. Remaining functional limitations include reaching across body, golfing, and lifting heavier objects at work . As a result of impairments patient has continued limitations with daily and functional activities and would benefit from continued skilled PT interventions to address these impairments in order to maximize function.         Goals  Impairment Goals: 4-6 weeks  - Patient to decrease pain to 0/10 - MET  - Patient to improve shoulder AROM to equal to uninvolved side in all planes - MOSTLY MET  - Patient to increase shoulder strength to 4+/5 throughout - MOSTLY MET    Functional Goals: by discharge  - Patient to discharge to independent [facility] - PROGRESSING  - Patient to improve subjective functional level to 95% - PROGRESSING  - Patient to be able to reach behind back without increased pain/compensation/difficulty - PROGRESSING  - Patient to improve tolerance to golfing - PROGRESSING  - Patient to improve tolerance to lifting at work -  PROGRESSING    Plan  Patient would benefit from: skilled physical therapy  Planned modality interventions: cryotherapy, TENS and thermotherapy: hydrocollator packs    Planned therapy interventions: flexibility, home exercise program, joint mobilization, manual therapy, neuromuscular re-education, patient education, strengthening, stretching, therapeutic activities, therapeutic exercise and functional ROM exercises    Frequency: 1x week  Duration in weeks: 6  Treatment plan discussed with: patient        Subjective Evaluation    History of Present Illness  Mechanism of injury: HISTORY OF PRESENT ILLNESS: Patient reports his shoulder is improving since beginning PT. He has not yet attempted golf. Reaching across the body still produces a non-painful click however not as frequently.   PRIOR TREATMENT: none  AGGRAVATING FACTORS: reaching across body, golfing, and lifting heavier objects at work   EASING FACTORS: none  WORK:  for [employer]  IMAGING: x-rays negative  FUNCTIONAL LIMITATIONS: reaching across body, golfing, and lifting heavier objects at work   IMPROVEMENTS: tolerance to lying on R side, reduced frequency of shoulder clicking, and reduced pain intensity  SUBJECTIVE FUNCTIONAL LEVEL: 90%  PATIENT GOAL: pain-free and get back to golf  Pain  Current pain ratin  At best pain ratin  At worst pain ratin  Location: R shoulder          Objective     Active Range of Motion   Left Shoulder   Normal active range of motion    Right Shoulder   Flexion: WFL  Abduction: WFL  External rotation BTH: WFL  Internal rotation BTB: T12 with pain    Passive Range of Motion   Left Shoulder   Normal passive range of motion    Right Shoulder   Normal passive range of motion    Strength/Myotome Testing     Left Shoulder     Planes of Motion   Flexion: 4+   Abduction: 4+   External rotation at 0°: 4+   Internal rotation at 0°: 4+     Isolated Muscles   Lower trapezius: 4+   Middle trapezius: 4+     Right Shoulder  "    Planes of Motion   Flexion: 4+   Abduction: 4 (pain)   External rotation at 0°: 4+   Internal rotation at 0°: 4+     Isolated Muscles   Lower trapezius: 4   Middle trapezius: 4     Tests     Right Shoulder   Positive Neer's.   Negative empty can, Hawkin's and painful arc.     Additional Tests Details  (-) ER resistance      Flowsheet Rows      Flowsheet Row Most Recent Value   PT/OT G-Codes    Current Score 65   Projected Score 72                 Diagnosis: R subacromial impingement syndrome   Precautions: hx cancer   Primary impairments: ER strength deficits and middle/lower trapezius strength deficits   *asterisks by exercise = given for HEP    6/9 6/23 6/30 7/7 7/14   Manuals        Posterior GH mobilizations   8'  8'  8'                                    There Ex        UBE   F/B x 6'  F/B x 6'  F/B x 6'  F/B x 6'   Sleeper stretch *   30\" x 3  HEP     S/L ER *  2 lb x 20  2 lb x 30  2 lb x 30  HEP    Prone rows   2 lb 1\" x 20  2 lb 1\" x 20  3 lb 1\" x 20    Prone ext *  1\" x 20  1\" x 20  1 lb 1\" x 20  3 lb 1\" x 20    Prone H. Abd *   1\" x 20  1\" x 20  1 lb 1\" x 20    Band ER/IR   Green 3 x 15 ea  Green 3 x 15 ea  Green 3 x 15 ea    Band no moneys *    Red x 30  HEP    Body blade neutral and flexion    20\" x 3 ea  30\" x 3 ea  30\" x 3   Band scapular clock *     Red x 15 B  Red x 15 B   Self FIR stretch *      Reviewed                    Neuro Re-Ed                                                                                                        Re-evaluation          CM   Ther Act/Gait                                         Modalities                                                          "

## 2025-07-14 NOTE — LETTER
2025    Valerie Bailey DO  [address] [facility] [address]    Patient: Wayne Mosley   YOB: 1960   Date of Visit: 2025     Encounter Diagnosis     ICD-10-CM    1. Acute pain of right shoulder  M25.511           Dear Dr. Valerie Bailey DO:    Thank you for your recent referral of Wayne Mosley. Please review the attached evaluation summary from Wayne's recent visit.     Please verify that you agree with the plan of care by signing the attached order.     If you have any questions or concerns, please do not hesitate to call.     I sincerely appreciate the opportunity to share in the care of one of your patients and hope to have another opportunity to work with you in the near future.       Sincerely,    Danny Tay, PT      Referring Provider:      I certify that I have read the below Plan of Care and certify the need for these services furnished under this plan of treatment while under my care.                    Valerie Bailey DO  [address] [facility] [address]  Via Fax: [phone]          PT Re-Evaluation     Today's date: 2025  Patient name: Wayne Mosley  : 1960  MRN: [id number]  Referring provider: Valerie Bailey DO  Dx:   Encounter Diagnosis     ICD-10-CM    1. Acute pain of right shoulder  M25.511           Start Time: 945  Stop Time: 1025  Total time in clinic (min): 40 minutes    Assessment  Impairments: abnormal muscle firing, abnormal or restricted ROM, abnormal movement, activity intolerance, impaired physical strength, pain with function, poor body mechanics and activity limitations    Assessment details: Patient is a 65 y.o. male presenting to reexamination with chief complaint of R shoulder pain. Patient exhibits fair progress toward objective and functional goals at time of reexamination. Patient exhibits improvements with tolerance to lying on R side, reduced frequency of shoulder clicking, and reduced pain intensity since  initiating PT however continues to have limitations compared to prior level of function. Remaining functional limitations include reaching across body, golfing, and lifting heavier objects at work . As a result of impairments patient has continued limitations with daily and functional activities and would benefit from continued skilled PT interventions to address these impairments in order to maximize function.         Goals  Impairment Goals: 4-6 weeks  - Patient to decrease pain to 0/10 - MET  - Patient to improve shoulder AROM to equal to uninvolved side in all planes - MOSTLY MET  - Patient to increase shoulder strength to 4+/5 throughout - MOSTLY MET    Functional Goals: by discharge  - Patient to discharge to independent [facility] - PROGRESSING  - Patient to improve subjective functional level to 95% - PROGRESSING  - Patient to be able to reach behind back without increased pain/compensation/difficulty - PROGRESSING  - Patient to improve tolerance to golfing - PROGRESSING  - Patient to improve tolerance to lifting at work - PROGRESSING    Plan  Patient would benefit from: skilled physical therapy  Planned modality interventions: cryotherapy, TENS and thermotherapy: hydrocollator packs    Planned therapy interventions: flexibility, home exercise program, joint mobilization, manual therapy, neuromuscular re-education, patient education, strengthening, stretching, therapeutic activities, therapeutic exercise and functional ROM exercises    Frequency: 1x week  Duration in weeks: 6  Treatment plan discussed with: patient        Subjective Evaluation    History of Present Illness  Mechanism of injury: HISTORY OF PRESENT ILLNESS: Patient reports his shoulder is improving since beginning PT. He has not yet attempted golf. Reaching across the body still produces a non-painful click however not as frequently.   PRIOR TREATMENT: none  AGGRAVATING FACTORS: reaching across body, golfing, and lifting heavier objects at work  "  EASING FACTORS: none  WORK:  for [employer]  IMAGING: x-rays negative  FUNCTIONAL LIMITATIONS: reaching across body, golfing, and lifting heavier objects at work   IMPROVEMENTS: tolerance to lying on R side, reduced frequency of shoulder clicking, and reduced pain intensity  SUBJECTIVE FUNCTIONAL LEVEL: 90%  PATIENT GOAL: pain-free and get back to golf  Pain  Current pain ratin  At best pain ratin  At worst pain ratin  Location: R shoulder          Objective     Active Range of Motion   Left Shoulder   Normal active range of motion    Right Shoulder   Flexion: WFL  Abduction: WFL  External rotation BTH: WFL  Internal rotation BTB: T12 with pain    Passive Range of Motion   Left Shoulder   Normal passive range of motion    Right Shoulder   Normal passive range of motion    Strength/Myotome Testing     Left Shoulder     Planes of Motion   Flexion: 4+   Abduction: 4+   External rotation at 0°: 4+   Internal rotation at 0°: 4+     Isolated Muscles   Lower trapezius: 4+   Middle trapezius: 4+     Right Shoulder     Planes of Motion   Flexion: 4+   Abduction: 4 (pain)   External rotation at 0°: 4+   Internal rotation at 0°: 4+     Isolated Muscles   Lower trapezius: 4   Middle trapezius: 4     Tests     Right Shoulder   Positive Neer's.   Negative empty can, Hawkin's and painful arc.     Additional Tests Details  (-) ER resistance      Flowsheet Rows      Flowsheet Row Most Recent Value   PT/OT G-Codes    Current Score 65   Projected Score 72                 Diagnosis: R subacromial impingement syndrome   Precautions: hx cancer   Primary impairments: ER strength deficits and middle/lower trapezius strength deficits   *asterisks by exercise = given for HEP       Manuals        Posterior GH mobilizations   8'  8'  8'                                    There Ex        UBE   F/B x 6'  F/B x 6'  F/B x 6'  F/B x 6'   Sleeper stretch *   30\" x 3  HEP     S/L ER *  2 lb x 20  2 lb x 30  2 lb " "x 30  HEP    Prone rows   2 lb 1\" x 20  2 lb 1\" x 20  3 lb 1\" x 20    Prone ext *  1\" x 20  1\" x 20  1 lb 1\" x 20  3 lb 1\" x 20    Prone H. Abd *   1\" x 20  1\" x 20  1 lb 1\" x 20    Band ER/IR   Green 3 x 15 ea  Green 3 x 15 ea  Green 3 x 15 ea    Band no moneys *    Red x 30  HEP    Body blade neutral and flexion    20\" x 3 ea  30\" x 3 ea  30\" x 3   Band scapular clock *     Red x 15 B  Red x 15 B   Self FIR stretch *      Reviewed                    Neuro Re-Ed                                                                                                        Re-evaluation          CM   Ther Act/Gait                                         Modalities                                                                          "

## 2025-07-21 ENCOUNTER — HOSPITAL ENCOUNTER (OUTPATIENT)
Dept: NEUROLOGY | Facility: CLINIC | Age: 65
Discharge: HOME | End: 2025-07-21
Attending: PSYCHIATRY & NEUROLOGY
Payer: MEDICARE

## 2025-07-21 ENCOUNTER — OFFICE VISIT (OUTPATIENT)
Dept: PHYSICAL THERAPY | Facility: REHABILITATION | Age: 65
End: 2025-07-21
Attending: FAMILY MEDICINE
Payer: MEDICARE

## 2025-07-21 DIAGNOSIS — R20.2 PARESTHESIA OF BOTH LOWER EXTREMITIES: ICD-10-CM

## 2025-07-21 DIAGNOSIS — M25.511 ACUTE PAIN OF RIGHT SHOULDER: Primary | ICD-10-CM

## 2025-07-21 PROBLEM — M54.12 CERVICAL RADICULOPATHY: Status: ACTIVE | Noted: 2025-07-21

## 2025-07-21 PROBLEM — M54.17 LUMBOSACRAL RADICULOPATHY: Status: ACTIVE | Noted: 2025-07-21

## 2025-07-21 PROCEDURE — 97110 THERAPEUTIC EXERCISES: CPT | Performed by: PHYSICAL THERAPIST

## 2025-07-21 PROCEDURE — 95886 MUSC TEST DONE W/N TEST COMP: CPT | Performed by: PSYCHIATRY & NEUROLOGY

## 2025-07-21 PROCEDURE — 95913 NRV CNDJ TEST 13/> STUDIES: CPT | Performed by: PSYCHIATRY & NEUROLOGY

## 2025-07-21 PROCEDURE — 97140 MANUAL THERAPY 1/> REGIONS: CPT | Performed by: PHYSICAL THERAPIST

## 2025-07-21 NOTE — PROGRESS NOTES
"Daily Note     Today's date: 2025  Patient name: Wayne Mosley  : 1960  MRN: [id number]  Referring provider: Valerie Bailey DO  Dx:   Encounter Diagnosis     ICD-10-CM    1. Acute pain of right shoulder  M25.511           Start Time: 0815  Stop Time: 0900  Total time in clinic (min): 45 minutes    Subjective: Patient reports he took some golf swings since his last visit which went okay. He took about 20 swings at a time with some soreness during however no residual aggravation later or the next day      Objective: See treatment diary below      Assessment: Tolerated treatment well. Initiated band-resisted golf swing motion at controlled pace as well as prone T, Y and I over ball. Progressed body blade from static to dynamic with movement. Patient demonstrated fatigue post treatment, exhibited good technique with therapeutic exercises, and would benefit from continued PT      Plan: Continue per plan of care.        Diagnosis: R subacromial impingement syndrome   Precautions: hx cancer   Primary impairments: ER strength deficits and middle/lower trapezius strength deficits   *asterisks by exercise = given for HEP       Manuals        Posterior GH mobilizations  8'  8'  8'  8'                                    There Ex        UBE  F/B x 6'  F/B x 6'  F/B x 6'  F/B x 6'  F/B x 6'   Sleeper stretch *   30\" x 3  HEP     S/L ER *   2 lb x 30  2 lb x 30  HEP    Prone rows   2 lb 1\" x 20  2 lb 1\" x 20  3 lb 1\" x 20    Prone ext *  HEP  1\" x 20  1 lb 1\" x 20  3 lb 1\" x 20    Prone H. Abd *  HEP  1\" x 20  1\" x 20  1 lb 1\" x 20    Band ER/IR  Blue 3 x 15 ea  Green 3 x 15 ea  Green 3 x 15 ea  Green 3 x 15 ea    Band no moneys *    Red x 30  HEP    Body blade with movement: ER/IR and flexion  X 15 ea       Band scapular clock *     Red x 15 B  Red x 15 B   Self FIR stretch *      Reviewed    Band golf swings  Green 2 x 20       Prone T, Y and I over ball  X 15 ea       Wall slides with " band   Red x 20                               Neuro Re-Ed                                                                                                        Re-evaluation         CM   Ther Act/Gait                                       Modalities

## 2025-07-25 LAB
ALBUMIN SERPL ELPH-MCNC: 4 G/DL (ref 3.8–4.8)
ALPHA1 GLOB SERPL ELPH-MCNC: 0.3 G/DL (ref 0.2–0.3)
ALPHA2 GLOB SERPL ELPH-MCNC: 0.6 G/DL (ref 0.5–0.9)
BETA1 GLOB SERPL ELPH-MCNC: 0.4 G/DL (ref 0.4–0.6)
BETA2 GLOB SERPL ELPH-MCNC: 0.3 G/DL (ref 0.2–0.5)
ERYTHROCYTE [SEDIMENTATION RATE] IN BLOOD BY WESTERGREN METHOD: 11 MM/H
GAMMA GLOB SERPL ELPH-MCNC: 1.4 G/DL (ref 0.8–1.7)
METHYLMALONATE SERPL-SCNC: 155 NMOL/L (ref 69–390)
PROT SERPL-MCNC: 7 G/DL (ref 6.1–8.1)
VIT B12 SERPL-MCNC: 302 PG/ML (ref 200–1100)
VIT B6 SERPL-MCNC: 104.5 NG/ML (ref 2.1–21.7)

## 2025-07-28 ENCOUNTER — RESULTS FOLLOW-UP (OUTPATIENT)
Dept: NEUROLOGY | Facility: CLINIC | Age: 65
End: 2025-07-28

## 2025-07-28 ENCOUNTER — TELEPHONE (OUTPATIENT)
Dept: ADMINISTRATIVE | Facility: OTHER | Age: 65
End: 2025-07-28

## 2025-07-28 ENCOUNTER — APPOINTMENT (OUTPATIENT)
Dept: LAB | Facility: CLINIC | Age: 65
End: 2025-07-28
Payer: MEDICARE

## 2025-07-28 ENCOUNTER — OFFICE VISIT (OUTPATIENT)
Dept: PHYSICAL THERAPY | Facility: REHABILITATION | Age: 65
End: 2025-07-28
Attending: FAMILY MEDICINE
Payer: MEDICARE

## 2025-07-28 DIAGNOSIS — M25.511 ACUTE PAIN OF RIGHT SHOULDER: Primary | ICD-10-CM

## 2025-07-28 DIAGNOSIS — Z01.818 PRE-OP EVALUATION: Primary | ICD-10-CM

## 2025-07-28 DIAGNOSIS — M54.12 CERVICAL RADICULOPATHY: Primary | ICD-10-CM

## 2025-07-28 DIAGNOSIS — M54.16 LUMBAR RADICULOPATHY: ICD-10-CM

## 2025-07-28 DIAGNOSIS — Z01.812 PRE-OPERATIVE LABORATORY EXAMINATION: ICD-10-CM

## 2025-07-28 LAB
ANION GAP SERPL CALCULATED.3IONS-SCNC: 9 MMOL/L (ref 4–13)
BASOPHILS # BLD AUTO: 0.04 THOUSANDS/ÂΜL (ref 0–0.1)
BASOPHILS NFR BLD AUTO: 1 % (ref 0–1)
BUN SERPL-MCNC: 26 MG/DL (ref 5–25)
CALCIUM SERPL-MCNC: 9.2 MG/DL (ref 8.4–10.2)
CHLORIDE SERPL-SCNC: 106 MMOL/L (ref 96–108)
CO2 SERPL-SCNC: 23 MMOL/L (ref 21–32)
CREAT SERPL-MCNC: 0.92 MG/DL (ref 0.6–1.3)
EOSINOPHIL # BLD AUTO: 0.25 THOUSAND/ÂΜL (ref 0–0.61)
EOSINOPHIL NFR BLD AUTO: 6 % (ref 0–6)
ERYTHROCYTE [DISTWIDTH] IN BLOOD BY AUTOMATED COUNT: 12.8 % (ref 11.6–15.1)
GFR SERPL CREATININE-BSD FRML MDRD: 85 ML/MIN/1.73SQ M
GLUCOSE SERPL-MCNC: 103 MG/DL (ref 65–140)
HCT VFR BLD AUTO: 43.4 % (ref 36.5–49.3)
HGB BLD-MCNC: 14 G/DL (ref 12–17)
IMM GRANULOCYTES # BLD AUTO: 0.01 THOUSAND/UL (ref 0–0.2)
IMM GRANULOCYTES NFR BLD AUTO: 0 % (ref 0–2)
LYMPHOCYTES # BLD AUTO: 0.74 THOUSANDS/ÂΜL (ref 0.6–4.47)
LYMPHOCYTES NFR BLD AUTO: 17 % (ref 14–44)
MCH RBC QN AUTO: 29.4 PG (ref 26.8–34.3)
MCHC RBC AUTO-ENTMCNC: 32.3 G/DL (ref 31.4–37.4)
MCV RBC AUTO: 91 FL (ref 82–98)
MONOCYTES # BLD AUTO: 0.51 THOUSAND/ÂΜL (ref 0.17–1.22)
MONOCYTES NFR BLD AUTO: 12 % (ref 4–12)
NEUTROPHILS # BLD AUTO: 2.77 THOUSANDS/ÂΜL (ref 1.85–7.62)
NEUTS SEG NFR BLD AUTO: 64 % (ref 43–75)
NRBC BLD AUTO-RTO: 0 /100 WBCS
PLATELET # BLD AUTO: 186 THOUSANDS/UL (ref 149–390)
PMV BLD AUTO: 11.5 FL (ref 8.9–12.7)
POTASSIUM SERPL-SCNC: 4.3 MMOL/L (ref 3.5–5.3)
RBC # BLD AUTO: 4.77 MILLION/UL (ref 3.88–5.62)
SODIUM SERPL-SCNC: 138 MMOL/L (ref 135–147)
WBC # BLD AUTO: 4.32 THOUSAND/UL (ref 4.31–10.16)

## 2025-07-28 PROCEDURE — 85025 COMPLETE CBC W/AUTO DIFF WBC: CPT

## 2025-07-28 PROCEDURE — 80048 BASIC METABOLIC PNL TOTAL CA: CPT

## 2025-07-28 PROCEDURE — 36415 COLL VENOUS BLD VENIPUNCTURE: CPT

## 2025-07-28 PROCEDURE — 97140 MANUAL THERAPY 1/> REGIONS: CPT | Performed by: PHYSICAL THERAPIST

## 2025-07-28 PROCEDURE — 97110 THERAPEUTIC EXERCISES: CPT | Performed by: PHYSICAL THERAPIST

## 2025-07-29 ENCOUNTER — CONSULT (OUTPATIENT)
Dept: FAMILY MEDICINE CLINIC | Facility: CLINIC | Age: 65
End: 2025-07-29
Payer: COMMERCIAL

## 2025-07-29 VITALS
WEIGHT: 155 LBS | OXYGEN SATURATION: 98 % | SYSTOLIC BLOOD PRESSURE: 118 MMHG | TEMPERATURE: 97.8 F | HEIGHT: 71 IN | HEART RATE: 56 BPM | BODY MASS INDEX: 21.7 KG/M2 | DIASTOLIC BLOOD PRESSURE: 76 MMHG

## 2025-07-29 DIAGNOSIS — Z96.652 S/P TOTAL KNEE ARTHROPLASTY, LEFT: ICD-10-CM

## 2025-07-29 DIAGNOSIS — Z01.818 PRE-OP EXAMINATION: Primary | ICD-10-CM

## 2025-07-29 PROCEDURE — 99243 OFF/OP CNSLTJ NEW/EST LOW 30: CPT

## 2025-07-29 PROCEDURE — 93000 ELECTROCARDIOGRAM COMPLETE: CPT

## 2025-08-05 ENCOUNTER — OFFICE VISIT (OUTPATIENT)
Dept: PHYSICAL THERAPY | Facility: REHABILITATION | Age: 65
End: 2025-08-05
Attending: FAMILY MEDICINE
Payer: COMMERCIAL

## 2025-08-05 DIAGNOSIS — M25.511 ACUTE PAIN OF RIGHT SHOULDER: Primary | ICD-10-CM

## 2025-08-05 PROCEDURE — 97110 THERAPEUTIC EXERCISES: CPT | Performed by: PHYSICAL THERAPIST

## 2025-08-05 PROCEDURE — 97140 MANUAL THERAPY 1/> REGIONS: CPT | Performed by: PHYSICAL THERAPIST

## 2025-08-06 ENCOUNTER — TELEPHONE (OUTPATIENT)
Age: 65
End: 2025-08-06

## 2025-08-07 ENCOUNTER — OFFICE VISIT (OUTPATIENT)
Dept: FAMILY MEDICINE CLINIC | Facility: CLINIC | Age: 65
End: 2025-08-07
Payer: COMMERCIAL

## 2025-08-07 VITALS
OXYGEN SATURATION: 99 % | DIASTOLIC BLOOD PRESSURE: 70 MMHG | WEIGHT: 159.4 LBS | HEIGHT: 70 IN | TEMPERATURE: 98.8 F | SYSTOLIC BLOOD PRESSURE: 130 MMHG | HEART RATE: 67 BPM | BODY MASS INDEX: 22.82 KG/M2

## 2025-08-07 DIAGNOSIS — E67.8 EXCESSIVE VITAMIN B6 INTAKE: ICD-10-CM

## 2025-08-07 DIAGNOSIS — R42 DIZZINESS: Primary | ICD-10-CM

## 2025-08-07 DIAGNOSIS — Z13.1 SCREENING FOR DIABETES MELLITUS: ICD-10-CM

## 2025-08-07 DIAGNOSIS — T75.3XXA MOTION SICKNESS, INITIAL ENCOUNTER: ICD-10-CM

## 2025-08-07 DIAGNOSIS — E53.8 B12 DEFICIENCY: ICD-10-CM

## 2025-08-07 LAB — SL AMB POCT HEMOGLOBIN AIC: 5.8 (ref ?–6.5)

## 2025-08-07 PROCEDURE — 99214 OFFICE O/P EST MOD 30 MIN: CPT | Performed by: FAMILY MEDICINE

## 2025-08-07 PROCEDURE — 83036 HEMOGLOBIN GLYCOSYLATED A1C: CPT | Performed by: FAMILY MEDICINE

## 2025-08-07 RX ORDER — MECLIZINE HYDROCHLORIDE 25 MG/1
25 TABLET ORAL EVERY 8 HOURS PRN
Qty: 30 TABLET | Refills: 1 | Status: SHIPPED | OUTPATIENT
Start: 2025-08-07

## 2025-08-11 ENCOUNTER — HOSPITAL ENCOUNTER (OUTPATIENT)
Dept: MRI IMAGING | Facility: HOSPITAL | Age: 65
Discharge: HOME | End: 2025-08-11
Payer: COMMERCIAL

## 2025-08-11 ENCOUNTER — OFFICE VISIT (OUTPATIENT)
Facility: REHABILITATION | Age: 65
End: 2025-08-11
Attending: FAMILY MEDICINE
Payer: COMMERCIAL

## 2025-08-18 DIAGNOSIS — M25.562 LEFT KNEE PAIN, UNSPECIFIED CHRONICITY: Primary | ICD-10-CM

## 2025-08-21 ENCOUNTER — TELEPHONE (OUTPATIENT)
Dept: OBGYN CLINIC | Facility: HOSPITAL | Age: 65
End: 2025-08-21

## (undated) DEVICE — SPECIMEN CONTAINER STERILE PEEL PACK

## (undated) DEVICE — BASKET STONE RTRVL 4 WIRE HELICAL

## (undated) DEVICE — STERILE SURGICAL LUBRICANT,  TUBE: Brand: SURGILUBE

## (undated) DEVICE — PREMIUM DRY TRAY LF: Brand: MEDLINE INDUSTRIES, INC.

## (undated) DEVICE — GLOVE SRG BIOGEL ECLIPSE 7.5

## (undated) DEVICE — GLOVE INDICATOR PI UNDERGLOVE SZ 8 BLUE

## (undated) DEVICE — GUIDEWIRE STRGHT TIP 0.035 IN  SOLO PLUS

## (undated) DEVICE — DISPOSABLE OR TOWEL: Brand: CARDINAL HEALTH

## (undated) DEVICE — GLOVE SRG BIOGEL 7

## (undated) DEVICE — PACK TUR

## (undated) DEVICE — CHLORHEXIDINE 4PCT 4 OZ

## (undated) DEVICE — SCD SEQUENTIAL COMPRESSION COMFORT SLEEVE MEDIUM KNEE LENGTH: Brand: KENDALL SCD